# Patient Record
Sex: FEMALE | Race: WHITE | Employment: FULL TIME | URBAN - METROPOLITAN AREA
[De-identification: names, ages, dates, MRNs, and addresses within clinical notes are randomized per-mention and may not be internally consistent; named-entity substitution may affect disease eponyms.]

---

## 2017-02-18 DIAGNOSIS — F43.23 ADJUSTMENT DISORDER WITH MIXED ANXIETY AND DEPRESSED MOOD: ICD-10-CM

## 2017-02-20 RX ORDER — FLUOXETINE HYDROCHLORIDE 20 MG/1
CAPSULE ORAL
Qty: 30 CAP | Refills: 5 | Status: SHIPPED | OUTPATIENT
Start: 2017-02-20 | End: 2017-08-25 | Stop reason: SDUPTHER

## 2017-06-16 ENCOUNTER — OFFICE VISIT (OUTPATIENT)
Dept: FAMILY MEDICINE CLINIC | Age: 52
End: 2017-06-16

## 2017-06-16 VITALS
DIASTOLIC BLOOD PRESSURE: 80 MMHG | SYSTOLIC BLOOD PRESSURE: 118 MMHG | BODY MASS INDEX: 47.09 KG/M2 | TEMPERATURE: 98.6 F | RESPIRATION RATE: 15 BRPM | HEART RATE: 87 BPM | WEIGHT: 293 LBS | OXYGEN SATURATION: 97 % | HEIGHT: 66 IN

## 2017-06-16 DIAGNOSIS — F43.23 ADJUSTMENT DISORDER WITH MIXED ANXIETY AND DEPRESSED MOOD: ICD-10-CM

## 2017-06-16 DIAGNOSIS — Z00.00 ROUTINE GENERAL MEDICAL EXAMINATION AT A HEALTH CARE FACILITY: Primary | ICD-10-CM

## 2017-06-16 DIAGNOSIS — E55.9 VITAMIN D DEFICIENCY: ICD-10-CM

## 2017-06-16 DIAGNOSIS — Z23 ENCOUNTER FOR IMMUNIZATION: ICD-10-CM

## 2017-06-16 DIAGNOSIS — I10 HTN (HYPERTENSION), BENIGN: ICD-10-CM

## 2017-06-16 DIAGNOSIS — C19 COLORECTAL CANCER (HCC): ICD-10-CM

## 2017-06-16 DIAGNOSIS — E66.01 MORBID OBESITY WITH BODY MASS INDEX (BMI) OF 45.0 TO 49.9 IN ADULT (HCC): ICD-10-CM

## 2017-06-16 DIAGNOSIS — M15.9 PRIMARY OSTEOARTHRITIS INVOLVING MULTIPLE JOINTS: ICD-10-CM

## 2017-06-16 RX ORDER — TOPIRAMATE 25 MG/1
25 TABLET ORAL 2 TIMES DAILY WITH MEALS
Qty: 60 TAB | Refills: 1 | Status: SHIPPED | OUTPATIENT
Start: 2017-06-16 | End: 2017-11-03

## 2017-06-16 NOTE — MR AVS SNAPSHOT
Visit Information Date & Time Provider Department Dept. Phone Encounter #  
 6/16/2017  9:00 AM Simmie Spurling, MD [facility] [phone] [id number] Upcoming Health Maintenance Date Due Pneumococcal 19-64 Highest Risk (1 of 3 - PCV13) 12/29/1983 INFLUENZA AGE 9 TO ADULT 8/1/2017 PAP AKA CERVICAL CYTOLOGY 2/10/2018 BREAST CANCER SCRN MAMMOGRAM 3/7/2018 DTaP/Tdap/Td series (2 - Td) 5/9/2021 COLONOSCOPY 3/2/2025 Allergies as of 6/16/2017  Review Complete On: 6/16/2017 By: León Layton LPN Severity Noted Reaction Type Reactions Sulfa (Sulfonamide Antibiotics) High 03/23/2010   Systemic Hives Current Immunizations  Never Reviewed Name Date Influenza Vaccine 11/14/2016 Influenza Vaccine (Quad) PF 10/1/2015 Influenza Vaccine PF 1/22/2014 TDAP Vaccine 5/9/2011 Td, Adsorbed PF 6/16/2017 Not reviewed this visit You Were Diagnosed With   
  
 Codes Comments Routine general medical examination at a health care facility    -  Primary ICD-10-CM: Z00.00 ICD-9-CM: V70.0   
 HTN (hypertension), benign     ICD-10-CM: I10 
ICD-9-CM: 401.1 [facility]     ICD-10-CM: C19 
ICD-9-CM: 154.0 Primary osteoarthritis involving multiple joints     ICD-10-CM: M15.0 ICD-9-CM: 715.09 Morbid obesity with body mass index (BMI) of 45.0 to 49.9 in adult [facility])     ICD-10-CM: E66.01, Z68.42 
ICD-9-CM: 278.01, V85.42 Vitamin D deficiency     ICD-10-CM: E55.9 ICD-9-CM: 268.9 Adjustment disorder with mixed anxiety and depressed mood     ICD-10-CM: F43.23 
ICD-9-CM: 309.28 Encounter for immunization     ICD-10-CM: S76 ICD-9-CM: V03.89 Vitals BP Pulse Temp Resp Height(growth percentile) Weight(growth percentile) 118/80 (BP 1 Location: Left arm, BP Patient Position: Sitting) 87 98.6 °F (37 °C) (Oral) 15 5' 6\" (1.676 m) 307 lb (139.3 kg) SpO2 BMI OB Status Smoking Status 97% 49.55 kg/m2 Postmenopausal Never Smoker Vitals History BMI and BSA Data Body Mass Index Body Surface Area 49.55 kg/m 2 2.55 m 2 Preferred Pharmacy Pharmacy Name Phone [address] [facility], [address] [place] [address] [phone] Your Updated Medication List  
  
   
This list is accurate as of: 6/16/17 10:34 AM.  Always use your most recent med list.  
  
  
  
  
 albuterol 90 mcg/actuation inhaler Commonly known as:  PROVENTIL HFA, VENTOLIN HFA, PROAIR HFA Take 2 Puffs by inhalation every six (6) hours as needed for Wheezing. calcium 500 mg Tab Take  by mouth. cyclobenzaprine 5 mg tablet Commonly known as:  FLEXERIL Take 1 Tab by mouth three (3) times daily as needed for Muscle Spasm(s). FLUoxetine 20 mg capsule Commonly known as:  PROzac TAKE ONE CAPSULE BY MOUTH EVERY DAY  
  
 lisinopril 10 mg tablet Commonly known as:  Tanda Limerick Take 1 Tab by mouth daily. MAGNESIUM CARBONATE  
by Does Not Apply route. meloxicam 15 mg tablet Commonly known as:  MOBIC  
TAKE ONE TABLET BY MOUTH EVERY DAY  
  
 omeprazole 20 mg capsule Commonly known as:  PRILOSEC Take 1 Cap by mouth daily. Indications: PREVENTION OF NSAID-INDUCED GASTRIC ULCER  
  
 potassium 99 mg tablet Take 99 mg by mouth daily. topiramate 25 mg tablet Commonly known as:  TOPAMAX Take 1 Tab by mouth two (2) times daily (with meals). VITAMIN D3 1,000 unit Cap Generic drug:  cholecalciferol Take 2,000 Units by mouth daily (after breakfast). Prescriptions Sent to Pharmacy Refills  
 topiramate (TOPAMAX) 25 mg tablet 1 Sig: Take 1 Tab by mouth two (2) times daily (with meals). Class: Normal  
 Pharmacy: [address] [place] [address] Ph #: [phone] Route: Oral  
  
We Performed the Following AMB POC EKG ROUTINE W/ 12 LEADS, INTER & REP [15698 CPT(R)] CBC W/O DIFF [90895 CPT(R)] LIPID PANEL [12561 CPT(R)] METABOLIC PANEL, COMPREHENSIVE [44808 CPT(R)] T4, FREE [id number] CPT(R)] TETANUS AND DIPHTHERIA TOXOIDS (TD) ADSORBED, PRES. FREE, IN INDIVIDS. >=7, IM [id number] CPT(R)] TSH 3RD GENERATION [52065 CPT(R)] URINALYSIS W/MICROSCOPIC [87741 CPT(R)] VITAMIN D, 25 HYDROXY [id number] CPT(R)] Introducing [facility]! Dear Kenisha Blanco: 
Thank you for requesting a [employer] account. Our records indicate that you already have an active [employer]oakroom account. You can access your account anytime at [email or web address] Did you know that you can access your hospital and ER discharge instructions at any time in [employer]? You can also review all of your test results from your hospital stay or ER visit. Additional Information If you have questions, please visit the Frequently Asked Questions section of the [employer] website at [email or web address] Remember, [employer] is NOT to be used for urgent needs. For medical emergencies, dial 911. Now available from your iPhone and Android! Please provide this summary of care documentation to your next provider. Your primary care clinician is listed as [address] [facility], [facility]/s . If you have any questions after today's visit, please call [phone].

## 2017-06-16 NOTE — PROGRESS NOTES
Chief Complaint   Patient presents with    Complete Physical       1. Have you been to the ER, urgent care clinic since your last visit? Hospitalized since your last visit? No    2. Have you seen or consulted any other health care providers outside of the [address] [place] [address] since your last visit? Include any pap smears or colon screening. No    Body mass index is 49.55 kg/(m^2).

## 2017-06-16 NOTE — PROGRESS NOTES
HISTORY OF PRESENT ILLNESS  HPI  Bhavna Alarcon is a 45 y.o. Female with history of HTN, colorectal cancer, vitamin D deficiency, hyperlipidemia, and adjustment disorder with anxiety and depressed mood who presents to office today for a complete physical. Pt complains of lack of energy and motivation. She also states that she wakes up multiple times during the night and averages 30 minutes to fall back asleep. She denies a family or personal history of sleep apnea. Pt regularly does her mammogram at [facility] and pap smear with Dr. Lesley Selby. Both her mammogram and pap smear have been normal, though she does note that she had colon cancer 10 years ago. Pt denies smoking, lung disease, and asthma. Past Medical History:   Diagnosis Date    Adjustment disorder with mixed anxiety and depressed mood     Colorectal Cancer- radiation& chemo after partial colectomy     2008    DJD (degenerative joint disease) 11/29/2010    Elevated triglycerides with high cholesterol 9/29/2014    Encounter for immunization 10/1/2015    HTN (hypertension), benign 3/14/2016    Primary osteoarthritis involving multiple joints 10/1/2015    Vitamin D deficiency 9/29/2014     Past Surgical History:   Procedure Laterality Date    BIOPSY OF BREAST, INCISIONAL  [id number].  HX COLECTOMY  partial for sigmoid cancer- 9/2008     Current Outpatient Prescriptions on File Prior to Visit   Medication Sig Dispense Refill    FLUoxetine (PROZAC) 20 mg capsule TAKE ONE CAPSULE BY MOUTH EVERY DAY 30 Cap 5    meloxicam (MOBIC) 15 mg tablet TAKE ONE TABLET BY MOUTH EVERY DAY 30 Tab 5    lisinopril (PRINIVIL, ZESTRIL) 10 mg tablet Take 1 Tab by mouth daily. 90 Tab 1    omeprazole (PRILOSEC) 20 mg capsule Take 1 Cap by mouth daily.  Indications: PREVENTION OF NSAID-INDUCED GASTRIC ULCER 90 Cap 1    albuterol (PROVENTIL HFA, VENTOLIN HFA, PROAIR HFA) 90 mcg/actuation inhaler Take 2 Puffs by inhalation every six (6) hours as needed for Wheezing. 1 Inhaler 0    potassium 99 mg tablet Take 99 mg by mouth daily.  MAGNESIUM CARBONATE by Does Not Apply route.  calcium 500 mg tab Take  by mouth.  Cholecalciferol, Vitamin D3, (VITAMIN D3) 1,000 unit cap Take 2,000 Units by mouth daily (after breakfast). No current facility-administered medications on file prior to visit. Allergies   Allergen Reactions    Sulfa (Sulfonamide Antibiotics) Hives     Family History   Problem Relation Age of Onset    Alzheimer Mother    Jair Cypher Cancer Father      colo rectal cancer    Cancer Sister      breast left      Social History     Social History    Marital status:      Spouse name: N/A    Number of children: N/A    Years of education: N/A     Social History Main Topics    Smoking status: Never Smoker    Smokeless tobacco: Never Used    Alcohol use No    Drug use: No    Sexual activity: No     Other Topics Concern    None     Social History Narrative               Review of Systems   Constitutional: Positive for malaise/fatigue. Negative for chills, diaphoresis, fever and weight loss. HENT: Negative for congestion, ear discharge, ear pain, hearing loss, nosebleeds, sore throat and tinnitus. Eyes: Negative for blurred vision, double vision, photophobia, pain, discharge and redness. Respiratory: Negative for cough, hemoptysis, sputum production, shortness of breath, wheezing and stridor. Cardiovascular: Negative for chest pain, palpitations, orthopnea, claudication, leg swelling and PND. Gastrointestinal: Negative for abdominal pain, blood in stool, constipation, diarrhea, heartburn, melena, nausea and vomiting. Genitourinary: Negative for dysuria, flank pain, frequency, hematuria and urgency. Musculoskeletal: Negative for back pain, falls, joint pain, myalgias and neck pain. Skin: Negative for itching and rash.    Neurological: Negative for dizziness, tingling, tremors, sensory change, speech change, focal weakness, seizures, loss of consciousness, weakness and headaches. Endo/Heme/Allergies: Negative for environmental allergies and polydipsia. Does not bruise/bleed easily. Psychiatric/Behavioral: Negative for depression, hallucinations, memory loss, substance abuse and suicidal ideas. The patient has insomnia (staying asleep). The patient is not nervous/anxious. Results for orders placed or performed in visit on 06/16/17   URINALYSIS W/MICROSCOPIC   Result Value Ref Range    Specific Gravity 1.025 1.005 - 1.030    pH (UA) 6.5 5.0 - 7.5    Color Yellow Yellow    Appearance Clear Clear    Leukocyte Esterase Negative Negative    Protein Negative Negative/Trace    Glucose Negative Negative    Ketone Negative Negative    Blood Negative Negative    Bilirubin Negative Negative    Urobilinogen 0.2 0.2 - 1.0 mg/dL    Nitrites Negative Negative    Microscopic Examination Comment     Microscopic exam See additional order    MICROSCOPIC EXAMINATION   Result Value Ref Range    WBC 0-5 0 - 5 /hpf    RBC 0-2 0 - 2 /hpf    Epithelial cells 0-10 0 - 10 /hpf    Casts None seen None seen /lpf    Mucus Present Not Estab. Bacteria None seen None seen/Few               Physical Exam  Visit Vitals    /80 (BP 1 Location: Left arm, BP Patient Position: Sitting)    Pulse 87    Temp 98.6 °F (37 °C) (Oral)    Resp 15    Ht 5' 6\" (1.676 m)    Wt 307 lb (139.3 kg)    SpO2 97%    BMI 49.55 kg/m2     General:  Alert, cooperative, no distress, appears stated age. Head:  Normocephalic, without obvious abnormality, atraumatic. Eyes:  Conjunctivae/corneas clear. PERRL, EOMs intact. Fundi benign. Ears:  Normal TMs and external ear canals both ears. Normal audition to finger rub and fingernail clicking   Nose: Nares normal. Septum midline. Mucosa normal. No drainage or sinus tenderness.    Throat: Lips, mucosa, and tongue normal. Teeth and gums normal.   Neck: Supple, symmetrical, trachea midline, no adenopathy, thyroid: no enlargement/tenderness/nodules, no carotid bruit and no JVD. Back:   Symmetric, no curvature. ROM normal. No CVA tenderness. Lungs:   Clear to auscultation bilaterally. Chest wall:  No tenderness or deformity. Heart:  Regular rate and rhythm, S1, S2 normal, no murmur, click, rub or gallop. Abdomen:   Soft, non-tender. Bowel sounds normal. No masses,  No organomegaly. Morbidly obese, very difficult exam but no abnormalities appreciated   Extremities: Extremities normal, atraumatic, no cyanosis or edema. Pulses: 2+ and symmetric all extremities. Skin: Skin color, texture, turgor normal. No rashes or lesions. Lymph nodes: Cervical, supraclavicular, and axillary nodes normal.   Neurologic: CNII-XII intact. Normal strength, sensation and reflexes throughout. ASSESSMENT and PLAN    ICD-10-CM ICD-9-CM    1. Routine general medical examination at a health care facility Z00.00 V70.0 LIPID PANEL      METABOLIC PANEL, COMPREHENSIVE      CBC W/O DIFF      URINALYSIS W/MICROSCOPIC      AMB POC EKG ROUTINE W/ 12 LEADS, INTER & REP      VITAMIN D, 25 HYDROXY      T4, FREE      TSH 3RD GENERATION   2. HTN (hypertension), benign B59 668.3 METABOLIC PANEL, COMPREHENSIVE      CBC W/O DIFF      AMB POC EKG ROUTINE W/ 12 LEADS, INTER & REP   3. Colorectal Cancer- radiation& chemo after partial colectomy-2008 C19 154.0    4. Primary osteoarthritis involving multiple joints M15.0 715.09    5. Morbid obesity with body mass index (BMI) of 45.0 to 49.9 in adult ([facility]) E66.01 278.01 topiramate (TOPAMAX) 25 mg tablet    Z68.42 V85.42    6. Vitamin D deficiency E55.9 268.9 VITAMIN D, 25 HYDROXY   7. Adjustment disorder with mixed anxiety and depressed mood F43.23 309.28    8. Encounter for immunization Z23 V03.89 TETANUS AND DIPHTHERIA TOXOIDS (TD) ADSORBED, PRES.  FREE, IN INDIVIDS. >=7, IM     Chelsy was seen today for complete physical.    Diagnoses and all orders for this visit:    Routine general medical examination at a health care facility  -     LIPID PANEL  -     METABOLIC PANEL, COMPREHENSIVE  -     CBC W/O DIFF  -     URINALYSIS W/MICROSCOPIC  -     AMB POC EKG ROUTINE W/ 12 LEADS, INTER & REP  -     VITAMIN D, 25 HYDROXY  -     T4, FREE  -     TSH 3RD GENERATION    HTN (hypertension), benign  -     METABOLIC PANEL, COMPREHENSIVE  -     CBC W/O DIFF  -     AMB POC EKG ROUTINE W/ 12 LEADS, INTER & REP    Colorectal Cancer- radiation& chemo after partial colectomy-2008    Primary osteoarthritis involving multiple joints    Morbid obesity with body mass index (BMI) of 45.0 to 49.9 in adult (HCC)  -     topiramate (TOPAMAX) 25 mg tablet; Take 1 Tab by mouth two (2) times daily (with meals). Vitamin D deficiency  -     VITAMIN D, 25 HYDROXY    Adjustment disorder with mixed anxiety and depressed mood    Encounter for immunization  -     Tetanus and diphtheria toxoid (TD) adsorbed, pres. free,, in individuals >=7 years, IM    Other orders  -     MICROSCOPIC EXAMINATION      Follow-up Disposition:  Return in about 1 month (around 7/16/2017), or exertional SOB or CP, for F/U of start of Topamax. lab results and schedule of future lab studies reviewed with patient  reviewed diet, exercise and weight control  cardiovascular risk and specific lipid/LDL goals reviewed  reviewed medications and side effects in detail  Please call my office if there are any questions- [phone]. I will arrange for follow up after the lab tests done from today return  Recommended a weekly \"heart check. \" I went into detail how to do this. Call for refills on medications as needed. Discussed expected course/resolution/complications of diagnosis in detail with patient. Medication risks/benefits/costs/interactions/alternatives discussed with patient. Pt was given an after visit summary which includes diagnoses, current medications & vitals.    Pt expressed understanding with the diagnosis and plan    We updated her tetanus today, as she's prone to getting nicks and cuts related to her activities. I suggested that she do a weekly \"heart check\", and we talked about how to do that. She should continue to follow up with her oncologist as far as colon cancer; she's on the routine of an every three year colonoscopy. We talked about the shingles and pneumococcal vaccines; those are probably not indicated at her age. She should consider the shingles vaccine in her late 46s. She apparently takes meloxicam for joint discomfort, and one of the practitioners here told her to take it every other day. She notes that it doesn't help as much, and I told her that it's okay to return to once a day; reducing the dose was purely to reduce her risk. I reviewed with her signs and symptoms of blood in the GI tract. Discussed weight control in detail, the importance of mainly decreased carbs, and for weight maintenance, exercise; discussed different diets and that it isn't as important to watch the type of foods as it is to decrease calorie intake no matter what type of diet you do, etc.     Discussed benefit /risk of medication to help weight reduction vs continued diet and exercise; discussed briefly surgical options for weight loss; she agreed to try Topamax, which sometimes causes significant weight loss. We started her on that, and I reviewed the side effects of that. Eventually we'd like to have her on 50mg BID; we started her off with 25mg once a day. The benefit/risk, onset of action, method of taking, and side effects of this medication discussed in detail with the patient. This document was written by Jackson Montes, as dictated by Luis Corbett MD.  I have reviewed and agree with the above note and have made corrections where appropriate Adalberto Vargas M.D.

## 2017-06-16 NOTE — LETTER
6/19/2017 9:09 AM 
 
Ms. Deb Pelayo [address] [facility] Dear Deb Pelayo: 
 
Please find your most recent results below. Resulted Orders LIPID PANEL Result Value Ref Range Cholesterol, total 206 (H) 100 - 199 mg/dL Triglyceride 175 (H) 0 - 149 mg/dL HDL Cholesterol 36 (L) >39 mg/dL VLDL, calculated 35 5 - 40 mg/dL LDL, calculated 135 (H) 0 - 99 mg/dL Narrative Performed at:  [address]  [id number] : Mejia Seaman MD, Phone:  [phone] METABOLIC PANEL, COMPREHENSIVE Result Value Ref Range Glucose 102 (H) 65 - 99 mg/dL BUN 17 6 - 24 mg/dL Creatinine 0.70 0.57 - 1.00 mg/dL GFR est non- >59 mL/min/1.73 GFR est  >59 mL/min/1.73  
 BUN/Creatinine ratio 24 (H) 9 - 23 Sodium 141 134 - 144 mmol/L Potassium 4.9 3.5 - 5.2 mmol/L Chloride 99 96 - 106 mmol/L  
 CO2 25 18 - 29 mmol/L Calcium 9.4 8.7 - 10.2 mg/dL Protein, total 6.9 6.0 - 8.5 g/dL Albumin 4.4 3.5 - 5.5 g/dL GLOBULIN, TOTAL 2.5 1.5 - 4.5 g/dL A-G Ratio 1.8 1.2 - 2.2 Bilirubin, total 0.3 0.0 - 1.2 mg/dL Alk. phosphatase 64 39 - 117 IU/L  
 AST (SGOT) 22 0 - 40 IU/L  
 ALT (SGPT) 23 0 - 32 IU/L Narrative Performed at:  [address]  [id number] : Mejia Seaman MD, Phone:  [phone] CBC W/O DIFF Result Value Ref Range WBC 5.7 3.4 - 10.8 x10E3/uL  
 RBC 4.35 3.77 - 5.28 x10E6/uL HGB 12.5 11.1 - 15.9 g/dL HCT 37.4 34.0 - 46.6 % MCV 86 79 - 97 fL  
 MCH 28.7 26.6 - 33.0 pg  
 MCHC 33.4 31.5 - 35.7 g/dL  
 RDW 15.8 (H) 12.3 - 15.4 % PLATELET 337 965 - 973 x10E3/uL Narrative Performed at:  [address]  [id number] : Mejia Seaman MD, Phone:  [phone] URINALYSIS W/MICROSCOPIC Result Value Ref Range Specific Gravity 1.025 1.005 - 1.030  
 pH (UA) 6.5 5.0 - 7.5 Color Yellow Yellow Appearance Clear Clear Leukocyte Esterase Negative Negative Protein Negative Negative/Trace Glucose Negative Negative Ketone Negative Negative Blood Negative Negative Bilirubin Negative Negative Urobilinogen 0.2 0.2 - 1.0 mg/dL Nitrites Negative Negative Microscopic Examination Comment Comment:  
   Microscopic follows if indicated. Microscopic exam See additional order Comment:  
   Microscopic was indicated and was performed. Narrative Performed at:  [address]  [id number] : Vamshi Juan MD, Phone:  [phone] VITAMIN D, 25 HYDROXY Result Value Ref Range VITAMIN D, 25-HYDROXY 27.2 (L) 30.0 - 100.0 ng/mL Comment:  
   Vitamin D deficiency has been defined by the [address] [place] [address] practice guideline as a 
level of serum 25-OH vitamin D less than 20 ng/mL (1,2). The Endocrine Society went on to further define vitamin D 
insufficiency as a level between 21 and 29 ng/mL (2). 1. IOM ([employer]). 2010. Dietary reference 
   intakes for calcium and D. [address] [place] [address]: The 
   Confer. 2. Kody MF, Thang NC, Cristine CHRISTY, et al. 
   Evaluation, treatment, and prevention of vitamin D 
   deficiency: an Endocrine Society clinical practice 
   guideline. JCEM. 2011 Jul; 96(7):1911-30. Narrative Performed at:  [address]  [id number] : Vamshi Juan MD, Phone:  [phone] T4, FREE Result Value Ref Range T4, Free 1.05 0.82 - 1.77 ng/dL Narrative Performed at:  [address]  [id number] : Vamshi Juan MD, Phone:  [phone] TSH 3RD GENERATION Result Value Ref Range TSH 0.476 0.450 - 4.500 uIU/mL Narrative Performed at:  [address]  [id number] : Akhil Emmanuel MD, Phone:  [phone] MICROSCOPIC EXAMINATION Result Value Ref Range WBC 0-5 0 - 5 /hpf  
 RBC 0-2 0 - 2 /hpf Epithelial cells 0-10 0 - 10 /hpf Casts None seen None seen /lpf Mucus Present Not Estab. Bacteria None seen None seen/Few Narrative Performed at:  [address]  [id number] : Akhil Emmanuel MD, Phone:  [phone] CVD REPORT Result Value Ref Range INTERPRETATION Note Comment:  
   Supplement report is available. Narrative Performed at:  [address] [place] [address]  [id number] : Sina Rangel PhD, Phone:  [phone] RECOMMENDATIONS: 
The LDL slightly worse, the Triglycerides a little better. These will improve with weight loss. Hopefully you are tolerating the Topamax and that it will work well for you! Everything else is OK except the Vit D- Increase your Vit D to 3000 or 4000 units daily and stay on this life long. Recheck fasting office visit in 6 months; let's have you follow up in 4-6 weeks the use of topamax. Please call me if you have any questions: [phone] Sincerely, 
 
 
Norman Randall MD

## 2017-06-17 LAB
25(OH)D3+25(OH)D2 SERPL-MCNC: 27.2 NG/ML (ref 30–100)
ALBUMIN SERPL-MCNC: 4.4 G/DL (ref 3.5–5.5)
ALBUMIN/GLOB SERPL: 1.8 {RATIO} (ref 1.2–2.2)
ALP SERPL-CCNC: 64 IU/L (ref 39–117)
ALT SERPL-CCNC: 23 IU/L (ref 0–32)
APPEARANCE UR: CLEAR
AST SERPL-CCNC: 22 IU/L (ref 0–40)
BACTERIA #/AREA URNS HPF: NORMAL /[HPF]
BILIRUB SERPL-MCNC: 0.3 MG/DL (ref 0–1.2)
BILIRUB UR QL STRIP: NEGATIVE
BUN SERPL-MCNC: 17 MG/DL (ref 6–24)
BUN/CREAT SERPL: 24 (ref 9–23)
CALCIUM SERPL-MCNC: 9.4 MG/DL (ref 8.7–10.2)
CASTS URNS QL MICRO: NORMAL /LPF
CHLORIDE SERPL-SCNC: 99 MMOL/L (ref 96–106)
CHOLEST SERPL-MCNC: 206 MG/DL (ref 100–199)
CO2 SERPL-SCNC: 25 MMOL/L (ref 18–29)
COLOR UR: YELLOW
CREAT SERPL-MCNC: 0.7 MG/DL (ref 0.57–1)
EPI CELLS #/AREA URNS HPF: NORMAL /HPF
ERYTHROCYTE [DISTWIDTH] IN BLOOD BY AUTOMATED COUNT: 15.8 % (ref 12.3–15.4)
GLOBULIN SER CALC-MCNC: 2.5 G/DL (ref 1.5–4.5)
GLUCOSE SERPL-MCNC: 102 MG/DL (ref 65–99)
GLUCOSE UR QL: NEGATIVE
HCT VFR BLD AUTO: 37.4 % (ref 34–46.6)
HDLC SERPL-MCNC: 36 MG/DL
HGB BLD-MCNC: 12.5 G/DL (ref 11.1–15.9)
HGB UR QL STRIP: NEGATIVE
INTERPRETATION, 910389: NORMAL
KETONES UR QL STRIP: NEGATIVE
LDLC SERPL CALC-MCNC: 135 MG/DL (ref 0–99)
LEUKOCYTE ESTERASE UR QL STRIP: NEGATIVE
MCH RBC QN AUTO: 28.7 PG (ref 26.6–33)
MCHC RBC AUTO-ENTMCNC: 33.4 G/DL (ref 31.5–35.7)
MCV RBC AUTO: 86 FL (ref 79–97)
MICRO URNS: NORMAL
MICRO URNS: NORMAL
MUCOUS THREADS URNS QL MICRO: PRESENT
NITRITE UR QL STRIP: NEGATIVE
PH UR STRIP: 6.5 [PH] (ref 5–7.5)
PLATELET # BLD AUTO: 336 X10E3/UL (ref 150–379)
POTASSIUM SERPL-SCNC: 4.9 MMOL/L (ref 3.5–5.2)
PROT SERPL-MCNC: 6.9 G/DL (ref 6–8.5)
PROT UR QL STRIP: NEGATIVE
RBC # BLD AUTO: 4.35 X10E6/UL (ref 3.77–5.28)
RBC #/AREA URNS HPF: NORMAL /HPF
SODIUM SERPL-SCNC: 141 MMOL/L (ref 134–144)
SP GR UR: 1.02 (ref 1–1.03)
T4 FREE SERPL-MCNC: 1.05 NG/DL (ref 0.82–1.77)
TRIGL SERPL-MCNC: 175 MG/DL (ref 0–149)
TSH SERPL DL<=0.005 MIU/L-ACNC: 0.48 UIU/ML (ref 0.45–4.5)
UROBILINOGEN UR STRIP-MCNC: 0.2 MG/DL (ref 0.2–1)
VLDLC SERPL CALC-MCNC: 35 MG/DL (ref 5–40)
WBC # BLD AUTO: 5.7 X10E3/UL (ref 3.4–10.8)
WBC #/AREA URNS HPF: NORMAL /HPF

## 2017-06-17 NOTE — PROGRESS NOTES
The LDL slightly worse, the Triglycerides a little better. These will improve with weight loss. Hopefully you are tolerating the Topamax and that it will work well for you! Everything else is OK except the Vit D- Increase your Vit D to 3000 or 4000 units daily and stay on this life long. Recheck fasting office visit in 6 months; let's have you follow up in 4-6 weeks the use of topamax.

## 2017-07-05 DIAGNOSIS — I10 HTN (HYPERTENSION), BENIGN: ICD-10-CM

## 2017-07-06 RX ORDER — LISINOPRIL 10 MG/1
TABLET ORAL
Qty: 30 TAB | Refills: 5 | Status: SHIPPED | OUTPATIENT
Start: 2017-07-06 | End: 2018-01-12 | Stop reason: SDUPTHER

## 2017-07-06 NOTE — TELEPHONE ENCOUNTER
Received faxed refill request for this medication from the pharmacy that is on file.     lisinopril (PRINIVIL, ZESTRIL) 10 mg tablet [Pharmacy Med Name: LISINOPRIL 10 MG TAB>]  TAKE ONE TABLET BY MOUTH DAILY, Normal, Disp-30 Tab, R-0

## 2017-08-25 DIAGNOSIS — F43.23 ADJUSTMENT DISORDER WITH MIXED ANXIETY AND DEPRESSED MOOD: ICD-10-CM

## 2017-08-29 RX ORDER — FLUOXETINE HYDROCHLORIDE 20 MG/1
CAPSULE ORAL
Qty: 30 CAP | Refills: 0 | Status: SHIPPED | OUTPATIENT
Start: 2017-08-29 | End: 2017-09-27 | Stop reason: SDUPTHER

## 2017-09-27 DIAGNOSIS — F43.23 ADJUSTMENT DISORDER WITH MIXED ANXIETY AND DEPRESSED MOOD: ICD-10-CM

## 2017-09-28 RX ORDER — FLUOXETINE HYDROCHLORIDE 20 MG/1
CAPSULE ORAL
Qty: 30 CAP | Refills: 8 | Status: SHIPPED | OUTPATIENT
Start: 2017-09-28 | End: 2018-07-02 | Stop reason: SDUPTHER

## 2017-10-05 DIAGNOSIS — M15.9 PRIMARY OSTEOARTHRITIS INVOLVING MULTIPLE JOINTS: ICD-10-CM

## 2017-10-05 RX ORDER — MELOXICAM 15 MG/1
TABLET ORAL
Qty: 30 TAB | Refills: 0 | Status: SHIPPED | OUTPATIENT
Start: 2017-10-05 | End: 2017-10-30 | Stop reason: SDUPTHER

## 2017-10-06 DIAGNOSIS — Z79.1 LONG TERM (CURRENT) USE OF NON-STEROIDAL ANTI-INFLAMMATORIES (NSAID): ICD-10-CM

## 2017-10-06 RX ORDER — OMEPRAZOLE 20 MG/1
20 CAPSULE, DELAYED RELEASE ORAL DAILY
Qty: 90 CAP | Refills: 1 | Status: SHIPPED | OUTPATIENT
Start: 2017-10-06 | End: 2018-05-02 | Stop reason: SDUPTHER

## 2017-10-30 DIAGNOSIS — M15.9 PRIMARY OSTEOARTHRITIS INVOLVING MULTIPLE JOINTS: ICD-10-CM

## 2017-10-30 RX ORDER — MELOXICAM 15 MG/1
TABLET ORAL
Qty: 30 TAB | Refills: 0 | Status: SHIPPED | OUTPATIENT
Start: 2017-10-30 | End: 2017-12-06 | Stop reason: SDUPTHER

## 2017-10-30 NOTE — TELEPHONE ENCOUNTER
Pharmacy on file verified. Patient states she needs a refill on her medication.    Best call back number for melany [phone] (H)  Requested Prescriptions     Pending Prescriptions Disp Refills    meloxicam (MOBIC) 15 mg tablet 30 Tab 0

## 2017-11-03 ENCOUNTER — HOSPITAL ENCOUNTER (EMERGENCY)
Age: 52
Discharge: HOME | End: 2017-11-03
Attending: FAMILY MEDICINE

## 2017-11-03 ENCOUNTER — HOSPITAL ENCOUNTER (OUTPATIENT)
Dept: LAB | Age: 52
Discharge: HOME | End: 2017-11-03

## 2017-11-03 VITALS
HEIGHT: 66 IN | DIASTOLIC BLOOD PRESSURE: 78 MMHG | TEMPERATURE: 97.5 F | HEART RATE: 80 BPM | WEIGHT: 293 LBS | BODY MASS INDEX: 47.09 KG/M2 | RESPIRATION RATE: 18 BRPM | OXYGEN SATURATION: 97 % | SYSTOLIC BLOOD PRESSURE: 174 MMHG

## 2017-11-03 DIAGNOSIS — B02.9 HERPES ZOSTER WITHOUT COMPLICATION: ICD-10-CM

## 2017-11-03 DIAGNOSIS — L03.019 PARONYCHIA OF FINGER, UNSPECIFIED LATERALITY: Primary | ICD-10-CM

## 2017-11-03 PROCEDURE — 87186 SC STD MICRODIL/AGAR DIL: CPT | Performed by: FAMILY MEDICINE

## 2017-11-03 PROCEDURE — 87077 CULTURE AEROBIC IDENTIFY: CPT | Performed by: FAMILY MEDICINE

## 2017-11-03 PROCEDURE — 87147 CULTURE TYPE IMMUNOLOGIC: CPT | Performed by: FAMILY MEDICINE

## 2017-11-03 PROCEDURE — 87205 SMEAR GRAM STAIN: CPT | Performed by: FAMILY MEDICINE

## 2017-11-03 RX ORDER — VALACYCLOVIR HYDROCHLORIDE 1 G/1
1000 TABLET, FILM COATED ORAL 3 TIMES DAILY
Qty: 21 TAB | Refills: 0 | Status: SHIPPED | OUTPATIENT
Start: 2017-11-03 | End: 2017-11-10

## 2017-11-03 RX ORDER — DOXYCYCLINE 100 MG/1
100 CAPSULE ORAL 2 TIMES DAILY
Qty: 20 CAP | Refills: 0 | Status: SHIPPED | OUTPATIENT
Start: 2017-11-03 | End: 2017-11-13

## 2017-11-03 RX ORDER — CEPHALEXIN 500 MG/1
500 CAPSULE ORAL 3 TIMES DAILY
Qty: 30 CAP | Refills: 0 | Status: SHIPPED | OUTPATIENT
Start: 2017-11-03 | End: 2017-11-13

## 2017-11-03 RX ORDER — PREDNISONE 5 MG/1
TABLET ORAL
Qty: 21 TAB | Refills: 0 | Status: SHIPPED | OUTPATIENT
Start: 2017-11-03 | End: 2017-11-29

## 2017-11-03 NOTE — DISCHARGE INSTRUCTIONS
Paronychia: Care Instructions  Your Care Instructions  Paronychia (say \"rfmy-dl-OG-mike-uh\") is an infection of the skin around a fingernail or toenail. It happens when germs enter through a break in the skin. The doctor may have made a small cut in the infected area to drain the pus. Most cases of paronychia improve in a few days. But watch your symptoms and follow your doctor's advice. Though rare, a mild case can turn into something more serious and infect your entire finger or toe. Also, it is possible for an infection to return. Follow-up care is a key part of your treatment and safety. Be sure to make and go to all appointments, and call your doctor if you are having problems. It's also a good idea to know your test results and keep a list of the medicines you take. How can you care for yourself at home? · If your doctor told you how to care for your infected nail, follow the doctor's instructions. If you did not get instructions, follow this general advice:  ¨ Wash the area with clean water 2 times a day. Don't use hydrogen peroxide or alcohol, which can slow healing. ¨ You may cover the area with a thin layer of petroleum jelly, such as Vaseline, and a nonstick bandage. ¨ Apply more petroleum jelly and replace the bandage as needed. · If your doctor prescribed antibiotics, take them as directed. Do not stop taking them just because you feel better. You need to take the full course of antibiotics. · Take an over-the-counter pain medicine, such as acetaminophen (Tylenol), ibuprofen (Advil, Motrin), or naproxen (Aleve). Read and follow all instructions on the label. · Do not take two or more pain medicines at the same time unless the doctor told you to. Many pain medicines have acetaminophen, which is Tylenol. Too much acetaminophen (Tylenol) can be harmful. · Prop up the toe or finger so that it is higher than the level of your heart. This will help with pain and swelling. · Apply heat.  Put a warm water bottle, heating pad set on low, or warm cloth on your finger or toe. Do not go to sleep with a heating pad on your skin. · Soak the area in warm water twice a day for 15 minutes each time. After soaking, dry the area well and apply a thin layer of petroleum jelly, such as Vaseline. Put on a new bandage. When should you call for help? Call your doctor now or seek immediate medical care if:  ? · You have signs of new or worsening infection, such as:  ¨ Increased pain, swelling, warmth, or redness. ¨ Red streaks leading from the infected skin. ¨ Pus draining from the area. ¨ A fever. ? Watch closely for changes in your health, and be sure to contact your doctor if:  ? · You do not get better as expected. Where can you learn more? Go to http://adali-carly.info/. Enter C435 in the search box to learn more about \"Paronychia: Care Instructions. \"  Current as of: October 13, 2016  Content Version: 11.4  © 7436-2455 [employer]. Care instructions adapted under license by [employer] (which disclaims liability or warranty for this information). If you have questions about a medical condition or this instruction, always ask your healthcare professional. [address] any warranty or liability for your use of this information.

## 2017-11-03 NOTE — UC PROVIDER NOTE
Patient is a 45 y.o. female presenting with nail problem and rash. The history is provided by the patient. Nail Problem    This is a new problem. Episode onset: 3-4 days ago; right 3rd finger and left 4th finger adjacent to nail - redness, swelling, drainage. The problem has been gradually worsening. The problem is associated with an unknown factor. There has been no fever. The pain is moderate. The pain has been constant since onset. Associated symptoms include blisters, itching and pain. Treatments tried: warm soaks. The treatment provided no relief. Rash    This is a new problem. Episode onset: 2 weeks ago, right abdomen. The problem has not changed since onset. The problem is associated with an unknown factor. There has been no fever. The pain is mild. Associated symptoms include blisters, itching and pain. She has tried nothing for the symptoms. Past Medical History:   Diagnosis Date    Adjustment disorder with mixed anxiety and depressed mood     Colorectal Cancer- radiation& chemo after partial colectomy     2008    DJD (degenerative joint disease) 11/29/2010    Elevated triglycerides with high cholesterol 9/29/2014    Encounter for immunization 10/1/2015    HTN (hypertension), benign 3/14/2016    Primary osteoarthritis involving multiple joints 10/1/2015    Vitamin D deficiency 9/29/2014        Past Surgical History:   Procedure Laterality Date    BIOPSY OF BREAST, INCISIONAL  [id number].  HX COLECTOMY  partial for sigmoid cancer- 9/2008         Family History   Problem Relation Age of Onset    Alzheimer Mother     Cancer Father      colo rectal cancer    Cancer Sister      breast left         Social History     Social History    Marital status:      Spouse name: N/A    Number of children: N/A    Years of education: N/A     Occupational History    Not on file.      Social History Main Topics    Smoking status: Never Smoker    Smokeless tobacco: Never Used    Alcohol use No    Drug use: No    Sexual activity: No     Other Topics Concern    Not on file     Social History Narrative                ALLERGIES: Sulfa (sulfonamide antibiotics)    Review of Systems   Constitutional: Negative for chills and fever. Respiratory: Negative for shortness of breath and wheezing. Cardiovascular: Negative for chest pain and palpitations. Gastrointestinal: Negative for nausea and vomiting. Skin: Positive for itching and rash. Vitals:    11/03/17 1801   BP: 174/78   Pulse: 80   Resp: 18   Temp: 97.5 °F (36.4 °C)   SpO2: 97%   Weight: 143.3 kg (316 lb)   Height: 5' 6\" (1.676 m)       Physical Exam   Constitutional: She appears well-developed and well-nourished. No distress. Skin: She is not diaphoretic. Right 3rd finger, distal: erythema, swelling, TTP, mild purulent drainage; no fluctuance    Right abdomen: erythematous, crusting papular lesions in dermatomal pattern   Psychiatric: She has a normal mood and affect. Her behavior is normal. Judgment and thought content normal.   Nursing note and vitals reviewed. MDM     Differential Diagnosis; Clinical Impression; Plan:     CLINICAL IMPRESSION:  Paronychia of finger, unspecified laterality  (primary encounter diagnosis)  Herpes zoster without complication    Plan:  1. Keflex + doxycycline  2. Valtrex, Prednisone  3. Probiotics  4. Wound Cx  Risk of Significant Complications, Morbidity, and/or Mortality:   Presenting problems: Moderate  Management options:   Moderate  Progress:   Patient progress:  Stable      Procedures

## 2017-11-05 LAB
BACTERIA SPEC CULT: ABNORMAL
GRAM STN SPEC: ABNORMAL
GRAM STN SPEC: ABNORMAL
SERVICE CMNT-IMP: ABNORMAL

## 2017-11-05 NOTE — UC NOTE
Pt returned phone call. ID verified, given results of wound culture. Pt instructed to complete all antibiotics and f/u with PCP.

## 2017-11-05 NOTE — UC NOTE
Received results of wound culture. Attempted to notify pt, no answer, left message for her to return call.

## 2017-11-29 ENCOUNTER — HOSPITAL ENCOUNTER (EMERGENCY)
Age: 52
Discharge: HOME | End: 2017-11-29
Attending: FAMILY MEDICINE

## 2017-11-29 VITALS
DIASTOLIC BLOOD PRESSURE: 80 MMHG | RESPIRATION RATE: 18 BRPM | OXYGEN SATURATION: 97 % | WEIGHT: 293 LBS | HEART RATE: 95 BPM | SYSTOLIC BLOOD PRESSURE: 144 MMHG | TEMPERATURE: 98.2 F | HEIGHT: 66 IN | BODY MASS INDEX: 47.09 KG/M2

## 2017-11-29 DIAGNOSIS — J06.9 VIRAL URI: Primary | ICD-10-CM

## 2017-11-29 NOTE — DISCHARGE INSTRUCTIONS
Learning About COPD and Upper Respiratory Infections  What are upper respiratory infections? An upper respiratory infection, also called a URI, is an infection of the nose, sinuses, or throat. Viruses or bacteria can cause URIs. Colds, the flu, and sinusitis are examples of URIs. These infections are spread by coughs, sneezes, and close contact. How do these infections affect COPD? A URI can worsen COPD symptoms, such as having too much mucus in your lungs, coughing, or being short of breath. What can you do to manage most infections at home? · Do not smoke. Avoid secondhand smoke. · Take an over-the-counter pain medicine, such as acetaminophen (Tylenol), ibuprofen (Advil, Motrin), or naproxen (Aleve). Read and follow all instructions on the label. · Be careful when taking over-the-counter cold or flu medicines and Tylenol at the same time. Many of these medicines have acetaminophen, which is Tylenol. Read the labels to make sure that you are not taking more than the recommended dose. Too much acetaminophen (Tylenol) can be harmful. · If your doctor prescribed antibiotics, take them as directed. Do not stop taking them just because you feel better. You need to take the full course of antibiotics. · Ask your doctor about cough medicines and decongestants. Some doctors recommend these medicines, while others feel that they do not help. · Learn breathing techniques for COPD, such as breathing through pursed lips. These techniques can help you breathe easier. What can you do to prevent these infections? Stay healthy  · Do not smoke. This is the most important step you can take to prevent more damage to your lungs. If you need help quitting, talk to your doctor about stop-smoking programs and medicines. These can increase your chances of quitting for good. · Avoid secondhand smoke, air pollution, and high altitudes. Also avoid cold, dry air and hot, humid air.  Stay at home with your windows closed when air pollution is bad. · Get a flu shot every year. · Get a pneumococcal vaccine shot. If you have had one before, ask your doctor whether you need another dose. · If you must be around people with colds or the flu, wash your hands often. Exercise and eat well  · If your doctor recommends it, get more exercise. Walking is a good choice. Bit by bit, increase the amount you walk every day. Try for at least 30 minutes on most days of the week. · Eat regular, well-balanced meals. Eating right keeps your energy levels up and helps your body fight infection. · Get plenty of rest and sleep. Follow-up care is a key part of your treatment and safety. Be sure to make and go to all appointments, and call your doctor if you are having problems. It's also a good idea to know your test results and keep a list of the medicines you take. Where can you learn more? Go to http://adaliFuriouscarly.info/. Enter Z468 in the search box to learn more about \"Learning About COPD and Upper Respiratory Infections. \"  Current as of: May 12, 2017  Content Version: 11.4  © 3781-5151 [employer]. Care instructions adapted under license by [employer] (which disclaims liability or warranty for this information). If you have questions about a medical condition or this instruction, always ask your healthcare professional. [address] any warranty or liability for your use of this information. Upper Respiratory Infection (Cold): Care Instructions  Your Care Instructions    An upper respiratory infection, or URI, is an infection of the nose, sinuses, or throat. URIs are spread by coughs, sneezes, and direct contact. The common cold is the most frequent kind of URI. The flu and sinus infections are other kinds of URIs. Almost all URIs are caused by viruses. Antibiotics won't cure them. But you can treat most infections with home care.  This may include drinking lots of fluids and taking over-the-counter pain medicine. You will probably feel better in 4 to 10 days. The doctor has checked you carefully, but problems can develop later. If you notice any problems or new symptoms, get medical treatment right away. Follow-up care is a key part of your treatment and safety. Be sure to make and go to all appointments, and call your doctor if you are having problems. It's also a good idea to know your test results and keep a list of the medicines you take. How can you care for yourself at home? · To prevent dehydration, drink plenty of fluids, enough so that your urine is light yellow or clear like water. Choose water and other caffeine-free clear liquids until you feel better. If you have kidney, heart, or liver disease and have to limit fluids, talk with your doctor before you increase the amount of fluids you drink. · Take an over-the-counter pain medicine, such as acetaminophen (Tylenol), ibuprofen (Advil, Motrin), or naproxen (Aleve). Read and follow all instructions on the label. · Before you use cough and cold medicines, check the label. These medicines may not be safe for young children or for people with certain health problems. · Be careful when taking over-the-counter cold or flu medicines and Tylenol at the same time. Many of these medicines have acetaminophen, which is Tylenol. Read the labels to make sure that you are not taking more than the recommended dose. Too much acetaminophen (Tylenol) can be harmful. · Get plenty of rest.  · Do not smoke or allow others to smoke around you. If you need help quitting, talk to your doctor about stop-smoking programs and medicines. These can increase your chances of quitting for good. When should you call for help? Call 911 anytime you think you may need emergency care. For example, call if:  ? · You have severe trouble breathing.    ?Call your doctor now or seek immediate medical care if:  ? · You seem to be getting much sicker. ? · You have new or worse trouble breathing. ? · You have a new or higher fever. ? · You have a new rash. ? Watch closely for changes in your health, and be sure to contact your doctor if:  ? · You have a new symptom, such as a sore throat, an earache, or sinus pain. ? · You cough more deeply or more often, especially if you notice more mucus or a change in the color of your mucus. ? · You do not get better as expected. Where can you learn more? Go to http://adali-carly.info/. Enter Y950 in the search box to learn more about \"Upper Respiratory Infection (Cold): Care Instructions. \"  Current as of: May 12, 2017  Content Version: 11.4  © 8286-9964 [facility]. Care instructions adapted under license by [employer] (which disclaims liability or warranty for this information). If you have questions about a medical condition or this instruction, always ask your healthcare professional. [address] any warranty or liability for your use of this information.

## 2017-11-29 NOTE — UC PROVIDER NOTE
Patient is a 45 y.o. female presenting with sore throat. The history is provided by the patient. Sore Throat    This is a new problem. The current episode started 2 days ago. The problem has not changed since onset. There has been no fever. Associated symptoms include congestion, plugged ear sensation and cough. Pertinent negatives include no shortness of breath, no stridor, no swollen glands and no trouble swallowing. She has had no exposure to strep. She has tried nothing for the symptoms. Past Medical History:   Diagnosis Date    Adjustment disorder with mixed anxiety and depressed mood     Colorectal Cancer- radiation& chemo after partial colectomy     2008    DJD (degenerative joint disease) 11/29/2010    Elevated triglycerides with high cholesterol 9/29/2014    Encounter for immunization 10/1/2015    HTN (hypertension), benign 3/14/2016    Primary osteoarthritis involving multiple joints 10/1/2015    Vitamin D deficiency 9/29/2014        Past Surgical History:   Procedure Laterality Date    BIOPSY OF BREAST, INCISIONAL  [id number].  HX COLECTOMY  partial for sigmoid cancer- 9/2008         Family History   Problem Relation Age of Onset    Alzheimer Mother     Cancer Father      colo rectal cancer    Cancer Sister      breast left         Social History     Social History    Marital status:      Spouse name: N/A    Number of children: N/A    Years of education: N/A     Occupational History    Not on file. Social History Main Topics    Smoking status: Never Smoker    Smokeless tobacco: Never Used    Alcohol use No    Drug use: No    Sexual activity: No     Other Topics Concern    Not on file     Social History Narrative                ALLERGIES: Sulfa (sulfonamide antibiotics)    Review of Systems   HENT: Positive for congestion and sore throat. Negative for trouble swallowing. Respiratory: Positive for cough. Negative for shortness of breath and stridor.     All other systems reviewed and are negative. Vitals:    11/29/17 1337   BP: 144/80   Pulse: 95   Resp: 18   Temp: 98.2 °F (36.8 °C)   SpO2: 97%   Weight: 141.1 kg (311 lb)   Height: 5' 6\" (1.676 m)       Physical Exam   Constitutional: No distress. HENT:   Right Ear: Tympanic membrane and ear canal normal.   Left Ear: Tympanic membrane and ear canal normal.   Nose: Nose normal.   Mouth/Throat: No oropharyngeal exudate, posterior oropharyngeal edema or posterior oropharyngeal erythema. Eyes: Conjunctivae are normal. Right eye exhibits no discharge. Left eye exhibits no discharge. Neck: Neck supple. Pulmonary/Chest: Effort normal and breath sounds normal. No respiratory distress. She has no wheezes. She has no rales. Lymphadenopathy:     She has no cervical adenopathy. Skin: No rash noted. Nursing note and vitals reviewed. MDM     Differential Diagnosis; Clinical Impression; Plan:     CLINICAL IMPRESSION:  Viral URI  (primary encounter diagnosis)      DDX    Plan:    Fluids/ gargles  Claritin/ allegra   Tylenol cold-sinus - max strength 1-2 tab 4 times/ day    with Advil as needed    If not better in 4-5 days - may use Use Mucinex DM twice a day  Amount and/or Complexity of Data Reviewed:    Review and summarize past medical records:  Yes  Risk of Significant Complications, Morbidity, and/or Mortality:   Presenting problems: Moderate  Management options:   Moderate  Progress:   Patient progress:  Stable      Procedures

## 2017-12-06 DIAGNOSIS — M15.9 PRIMARY OSTEOARTHRITIS INVOLVING MULTIPLE JOINTS: ICD-10-CM

## 2017-12-06 RX ORDER — MELOXICAM 15 MG/1
TABLET ORAL
Qty: 30 TAB | Refills: 0 | Status: SHIPPED | OUTPATIENT
Start: 2017-12-06 | End: 2017-12-29 | Stop reason: SDUPTHER

## 2017-12-29 DIAGNOSIS — M15.9 PRIMARY OSTEOARTHRITIS INVOLVING MULTIPLE JOINTS: ICD-10-CM

## 2017-12-30 RX ORDER — MELOXICAM 15 MG/1
TABLET ORAL
Qty: 30 TAB | Refills: 0 | Status: SHIPPED | OUTPATIENT
Start: 2017-12-30 | End: 2018-01-17 | Stop reason: SDUPTHER

## 2018-01-12 DIAGNOSIS — I10 HTN (HYPERTENSION), BENIGN: ICD-10-CM

## 2018-01-12 RX ORDER — LISINOPRIL 10 MG/1
TABLET ORAL
Qty: 30 TAB | Refills: 0 | Status: SHIPPED | OUTPATIENT
Start: 2018-01-12 | End: 2018-01-17 | Stop reason: SDUPTHER

## 2018-01-17 ENCOUNTER — OFFICE VISIT (OUTPATIENT)
Dept: FAMILY MEDICINE CLINIC | Age: 53
End: 2018-01-17

## 2018-01-17 VITALS
HEART RATE: 82 BPM | DIASTOLIC BLOOD PRESSURE: 72 MMHG | TEMPERATURE: 98.2 F | RESPIRATION RATE: 18 BRPM | OXYGEN SATURATION: 97 % | SYSTOLIC BLOOD PRESSURE: 140 MMHG | BODY MASS INDEX: 47.09 KG/M2 | WEIGHT: 293 LBS | HEIGHT: 66 IN

## 2018-01-17 DIAGNOSIS — E78.2 ELEVATED TRIGLYCERIDES WITH HIGH CHOLESTEROL: ICD-10-CM

## 2018-01-17 DIAGNOSIS — M15.9 PRIMARY OSTEOARTHRITIS INVOLVING MULTIPLE JOINTS: ICD-10-CM

## 2018-01-17 DIAGNOSIS — C19 COLORECTAL CANCER (HCC): ICD-10-CM

## 2018-01-17 DIAGNOSIS — I10 HTN (HYPERTENSION), BENIGN: Primary | ICD-10-CM

## 2018-01-17 DIAGNOSIS — E66.01 MORBID OBESITY WITH BODY MASS INDEX (BMI) OF 45.0 TO 49.9 IN ADULT (HCC): ICD-10-CM

## 2018-01-17 DIAGNOSIS — E55.9 VITAMIN D DEFICIENCY: ICD-10-CM

## 2018-01-17 RX ORDER — MELOXICAM 15 MG/1
TABLET ORAL
Qty: 30 TAB | Refills: 5 | Status: SHIPPED | OUTPATIENT
Start: 2018-01-17 | End: 2018-08-13 | Stop reason: SDUPTHER

## 2018-01-17 RX ORDER — LISINOPRIL 10 MG/1
TABLET ORAL
Qty: 30 TAB | Refills: 5 | Status: SHIPPED | OUTPATIENT
Start: 2018-01-17 | End: 2018-08-13 | Stop reason: SDUPTHER

## 2018-01-17 NOTE — ASSESSMENT & PLAN NOTE
Well Controlled, based on history, physical exam and review of pertinent labs, studies and medications; meds reconciled; continue current treatment plan., This condition is managed by Specialist.  Key Oncology Meds     Patient is on no Oncologic meds.         Key Pain Meds             meloxicam (MOBIC) 15 mg tablet  (Taking) TAKE ONE TABLET BY MOUTH EVERY DAY        Lab Results   Component Value Date/Time    WBC 5.7 06/16/2017 10:35 AM    HGB 12.5 06/16/2017 10:35 AM    HCT 37.4 06/16/2017 10:35 AM    PLATELET 742 91/54/1243 10:35 AM    Creatinine 0.70 06/16/2017 10:35 AM    BUN 17 06/16/2017 10:35 AM    ALT (SGPT) 23 06/16/2017 10:35 AM    AST (SGOT) 22 06/16/2017 10:35 AM    Albumin 4.4 06/16/2017 10:35 AM

## 2018-01-17 NOTE — LETTER
6/15/2018 9:11 AM 
 
Ms. Christel Pelayo [address] [facility] Dear Christel Pelayo: 
 
Please find your most recent results below. Resulted Orders LIPID PANEL Result Value Ref Range Cholesterol, total 215 (H) 100 - 199 mg/dL Triglyceride 232 (H) 0 - 149 mg/dL HDL Cholesterol 41 >39 mg/dL VLDL, calculated 46 (H) 5 - 40 mg/dL LDL, calculated 128 (H) 0 - 99 mg/dL Narrative Performed at:  [address]  [id number] : Jesus Whipple MD, Phone:  [phone] METABOLIC PANEL, COMPREHENSIVE Result Value Ref Range Glucose 101 (H) 65 - 99 mg/dL BUN 16 6 - 24 mg/dL Creatinine 0.75 0.57 - 1.00 mg/dL GFR est non-AA 91 >59 mL/min/1.73 GFR est  >59 mL/min/1.73  
 BUN/Creatinine ratio 21 9 - 23 Sodium 141 134 - 144 mmol/L Potassium 4.5 3.5 - 5.2 mmol/L Chloride 99 96 - 106 mmol/L  
 CO2 26 18 - 29 mmol/L Calcium 9.6 8.7 - 10.2 mg/dL Protein, total 7.0 6.0 - 8.5 g/dL Albumin 4.4 3.5 - 5.5 g/dL GLOBULIN, TOTAL 2.6 1.5 - 4.5 g/dL A-G Ratio 1.7 1.2 - 2.2 Bilirubin, total 0.3 0.0 - 1.2 mg/dL Alk. phosphatase 68 39 - 117 IU/L  
 AST (SGOT) 20 0 - 40 IU/L  
 ALT (SGPT) 24 0 - 32 IU/L Narrative Performed at:  [address]  [id number] : Jesus Whipple MD, Phone:  [phone] VITAMIN D, 25 HYDROXY Result Value Ref Range VITAMIN D, 25-HYDROXY 25.8 (L) 30.0 - 100.0 ng/mL Comment:  
   Vitamin D deficiency has been defined by the [facility] practice guideline as a 
level of serum 25-OH vitamin D less than 20 ng/mL (1,2). The Endocrine Society went on to further define vitamin D 
insufficiency as a level between 21 and 29 ng/mL (2). 1. IOM ([employer]). 2010. Dietary reference 
   intakes for calcium and D. [facility]: The 
   [employer]. 2. Kody MF, Thang NC, Cristine CHRISTY, et al. 
   Evaluation, treatment, and prevention of vitamin D 
   deficiency: an Endocrine Society clinical practice 
   guideline. JCEM. 2011 Jul; 96(7):1911-30. Narrative Performed at:  [address]  [id number] : Agata Santos MD, Phone:  [phone] CVD REPORT Result Value Ref Range INTERPRETATION Note Comment:  
   Supplemental report is available. Narrative Performed at:  [address] [place] [address] [address] [place] [address]  [id number] : Velna Cooks PhD, Phone:  [phone] RECOMMENDATIONS: 
 
No changes- continue same meds, but take the Vit D daily, not sporadically. Has a follow up appt in July. Please call me if you have any questions: [phone] Sincerely, 
 
 
Tally Dubin, MD

## 2018-01-17 NOTE — ASSESSMENT & PLAN NOTE
Worsening, based on history, physical exam and review of pertinent labs, studies and medications; meds reconciled; continue current treatment plan, lifestyle modifications recommended, cONTINUE TO WORK ON WEIGHT LOSS, DECREASING CARBS ETC.. Key Obesity Meds     Patient is on no anti-obesity meds.         Lab Results   Component Value Date/Time    Hemoglobin A1c 5.6 12/16/2016 08:47 AM    Glucose 102 06/16/2017 10:35 AM    Cholesterol, total 206 06/16/2017 10:35 AM    HDL Cholesterol 36 06/16/2017 10:35 AM    LDL, calculated 135 06/16/2017 10:35 AM    Triglyceride 175 06/16/2017 10:35 AM    TSH 0.476 06/16/2017 10:35 AM    Sodium 141 06/16/2017 10:35 AM    Potassium 4.9 06/16/2017 10:35 AM    ALT (SGPT) 23 06/16/2017 10:35 AM    AST (SGOT) 22 06/16/2017 10:35 AM    VITAMIN D, 25-HYDROXY 27.2 06/16/2017 10:35 AM

## 2018-01-17 NOTE — PROGRESS NOTES
Chief Complaint   Patient presents with    Hypertension     fasting    1. Have you been to the ER, urgent care clinic since your last visit? Hospitalized since your last visit? No    2. Have you seen or consulted any other health care providers outside of the [address] [place] [address] since your last visit? Include any pap smears or colon screening.  No

## 2018-01-17 NOTE — MR AVS SNAPSHOT
[address] [facility] [address] 
 
 
 [address] 
[phone] Patient: Blanco Lisa MRN: [id number] :[id number] Visit Information Date & Time Provider Department Dept. Phone Encounter #  
 2018  8:15 AM Sang Falcon  [facility] [phone] [id number] Your Appointments 7/10/2018  8:00 AM  
ROUTINE CARE with Sang Falcon MD  
[facility]) Appt Note: fasting bp check [address] [address]  
[phone]  
  
   
 [address] [address] Upcoming Health Maintenance Date Due Influenza Age 5 to Adult 2017 PAP AKA CERVICAL CYTOLOGY 2/10/2018 BREAST CANCER SCRN MAMMOGRAM 3/7/2018 Bone Densitometry 3/28/2018 Pneumococcal 19-64 Highest Risk (2 of 3 - PCV13) 2018 COLONOSCOPY 3/2/2025 DTaP/Tdap/Td series (3 - Td) 2027 Allergies as of 2018  Review Complete On: 2018 By: Karel Fam, LPN Severity Noted Reaction Type Reactions Sulfa (Sulfonamide Antibiotics) High 2010   Systemic Hives Current Immunizations  Never Reviewed Name Date Influenza Vaccine 2016 Influenza Vaccine (Quad) PF 10/1/2015 Influenza Vaccine PF 2014 TDAP Vaccine 2011 Td, Adsorbed PF 2017 Not reviewed this visit You Were Diagnosed With   
  
 Codes Comments HTN (hypertension), benign    -  Primary ICD-10-CM: I10 
ICD-9-CM: 401.1 Elevated triglycerides with high cholesterol     ICD-10-CM: E78.2 ICD-9-CM: 272.2 Primary osteoarthritis involving multiple joints     ICD-10-CM: M15.0 ICD-9-CM: 715.09 Vitamin D deficiency     ICD-10-CM: E55.9 ICD-9-CM: 268.9 Vitals BP Pulse Temp Resp Height(growth percentile) Weight(growth percentile)  140/72 (BP 1 Location: Left arm, BP Patient Position: At rest) 82 98.2 °F (36.8 °C) 18 5' 6\" (1.676 m) 313 lb (142 kg) SpO2 BMI OB Status Smoking Status 97% 50.52 kg/m2 Postmenopausal Never Smoker BMI and BSA Data Body Mass Index Body Surface Area 50.52 kg/m 2 2.57 m 2 Preferred Pharmacy Pharmacy Name Phone [facility], [address] [place] [address] [phone] Your Updated Medication List  
  
   
This list is accurate as of: 1/17/18 10:06 AM.  Always use your most recent med list.  
  
  
  
  
 albuterol 90 mcg/actuation inhaler Commonly known as:  PROVENTIL HFA, VENTOLIN HFA, PROAIR HFA Take 2 Puffs by inhalation every six (6) hours as needed for Wheezing. calcium 500 mg Tab Take  by mouth. FLUoxetine 20 mg capsule Commonly known as:  PROzac TAKE ONE CAPSULE BY MOUTH EVERY DAY  
  
 lisinopril 10 mg tablet Commonly known as:  PRINIVIL, ZESTRIL  
TAKE ONE TABLET BY MOUTH EVERY DAY  
  
 MAGNESIUM CARBONATE  
by Does Not Apply route. meloxicam 15 mg tablet Commonly known as:  MOBIC  
TAKE ONE TABLET BY MOUTH EVERY DAY  
  
 omeprazole 20 mg capsule Commonly known as:  PRILOSEC Take 1 Cap by mouth daily. Indications: PREVENTION OF NSAID-INDUCED GASTRIC ULCER  
  
 potassium 99 mg tablet Take 99 mg by mouth daily. topiramate 25 mg tablet Commonly known as:  TOPAMAX TAKE ONE TABLET BY MOUTH TWO TIMES A DAY WITH MEALS  
  
 VITAMIN D3 1,000 unit Cap Generic drug:  cholecalciferol Take 2,000 Units by mouth daily (after breakfast). Prescriptions Sent to Pharmacy Refills  
 lisinopril (PRINIVIL, ZESTRIL) 10 mg tablet 5 Sig: TAKE ONE TABLET BY MOUTH EVERY DAY Class: Normal  
 Pharmacy: [facility] Ph #: [phone]  
 meloxicam (MOBIC) 15 mg tablet 5 Sig: TAKE ONE TABLET BY MOUTH EVERY DAY Class: Normal  
 Pharmacy: [facility], [address] Ph #: [phone] We Performed the Following LIPID PANEL [81435 CPT(R)] METABOLIC PANEL, COMPREHENSIVE [83618 CPT(R)] VITAMIN D, 25 HYDROXY [id number] CPT(R)] Introducing [facility] SERVICES! Dear Brenda Camarena: 
Thank you for requesting a [employer] account. Our records indicate that you already have an active [employer] account. You can access your account anytime at [email or web address] Did you know that you can access your hospital and ER discharge instructions at any time in [employer]? You can also review all of your test results from your hospital stay or ER visit. Additional Information If you have questions, please visit the Frequently Asked Questions section of the [employer] website at [email or web address] Remember, [employer] is NOT to be used for urgent needs. For medical emergencies, dial 911. Now available from your iPhone and Android! Please provide this summary of care documentation to your next provider. Your primary care clinician is listed as [address] [address], [facility]/s . If you have any questions after today's visit, please call [phone].

## 2018-01-17 NOTE — PROGRESS NOTES
HISTORY OF PRESENT ILLNESS  HPI  Galdino Renteria is a 47 y.o. female with history of HTN, vitamin D deficiency, hyperlipidemia, and adjustment disorder with anxiety and depressed mood who presents to office today for fasting follow-up. Pt does not check her BP out of office; she does have a cuff at home. She denies unusual SOB, chest pain, and any recent ER visits or hospitalizations. Pt went to  on 11/3 for a rash on her right rib area, which she states was diagnosed as shingles, and a staph infection under her right 3rd and left 4th fingers. She was given Keflex, doxycycline, and Valtrex. Pt began Topamax on 6/16/17 but states she stopped the medication due to side effects of leg tingling and increased appetite. Past Medical History:   Diagnosis Date    Adjustment disorder with mixed anxiety and depressed mood     Colorectal Cancer- radiation& chemo after partial colectomy     2008    DJD (degenerative joint disease) 11/29/2010    Elevated triglycerides with high cholesterol 9/29/2014    Encounter for immunization 10/1/2015    HTN (hypertension), benign 3/14/2016    Primary osteoarthritis involving multiple joints 10/1/2015    Vitamin D deficiency 9/29/2014     Past Surgical History:   Procedure Laterality Date    BIOPSY OF BREAST, INCISIONAL  [id number].  HX COLECTOMY  partial for sigmoid cancer- 9/2008     Current Outpatient Prescriptions on File Prior to Visit   Medication Sig Dispense Refill    omeprazole (PRILOSEC) 20 mg capsule Take 1 Cap by mouth daily. Indications: PREVENTION OF NSAID-INDUCED GASTRIC ULCER 90 Cap 1    FLUoxetine (PROZAC) 20 mg capsule TAKE ONE CAPSULE BY MOUTH EVERY DAY 30 Cap 8    potassium 99 mg tablet Take 99 mg by mouth daily.  MAGNESIUM CARBONATE by Does Not Apply route.  calcium 500 mg tab Take  by mouth.  Cholecalciferol, Vitamin D3, (VITAMIN D3) 1,000 unit cap Take 2,000 Units by mouth daily (after breakfast).        No current facility-administered medications on file prior to visit. Allergies   Allergen Reactions    Sulfa (Sulfonamide Antibiotics) Hives     Family History   Problem Relation Age of Onset    Alzheimer Mother    [facility] Cancer Father      colo rectal cancer    Cancer Sister      breast left      Social History     Social History    Marital status:      Spouse name: N/A    Number of children: N/A    Years of education: N/A     Social History Main Topics    Smoking status: Never Smoker    Smokeless tobacco: Never Used    Alcohol use No    Drug use: No    Sexual activity: No     Other Topics Concern    None     Social History Narrative             Review of Systems   Constitutional: Negative for chills, diaphoresis, fever, malaise/fatigue and weight loss. Eyes: Negative for blurred vision, double vision, pain and redness. Respiratory: Negative for cough, shortness of breath and wheezing. Cardiovascular: Negative for chest pain, palpitations, orthopnea, claudication, leg swelling and PND. Skin: Negative for itching and rash. Neurological: Negative for dizziness, tingling, tremors, sensory change, speech change, focal weakness, seizures, loss of consciousness, weakness and headaches. Results for orders placed or performed during the hospital encounter of 11/03/17   CULTURE, WOUND W GRAM STAIN   Result Value Ref Range    Special Requests: FINGER      GRAM STAIN OCCASIONAL WBCS SEEN      GRAM STAIN 1+ GRAM POSITIVE COCCI IN PAIRS      Culture result: (A)       LIGHT STREPTOCOCCI, BETA HEMOLYTIC GROUP A Penicillin and ampicillin are drugs of choice for treatment of beta-hemolytic streptococcal infections. Susceptibility testing of penicillins and beta-lactams approved by the FDA for treatment of beta-hemolytic streptococcal infections need not be performed routinely, because nonsusceptible isolates are extremely rare.  CLSI 2012    Culture result:        CALLED TO AND READ BACK BY   BRADEN GREENSTREET, RN ON 11/5/17 AT 1145. 31 [address] [facility]      Culture result: FEW STAPHYLOCOCCUS AUREUS (A)         Susceptibility    Staphylococcus aureus - CONSTANTINE     Ampicillin/sulbactam ($) <=8/4 Susceptible ug/mL     Cefazolin ($) <=4 Susceptible ug/mL     Clindamycin ($) <=0.5 Susceptible ug/mL     Daptomycin ($$$$$) <=0.5 Susceptible ug/mL     Erythromycin ($$$$) <=0.5 Susceptible ug/mL     Gentamicin ($) <=4 Susceptible ug/mL     Linezolid ($$$$$) 4 Susceptible ug/mL     Oxacillin <=0.25 Susceptible ug/mL     Rifampin ($$$$)* <=1 Susceptible ug/mL      * Rifampin is not to be used for mono-therapy. Tetracycline <=4 Susceptible ug/mL     Trimeth-Sulfamethoxa <=0.5/9.5 Susceptible ug/mL     Vancomycin ($) 2 Susceptible ug/mL     Ciprofloxacin ($) <=1 Susceptible ug/mL             Physical Exam  Visit Vitals    /72 (BP 1 Location: Left arm, BP Patient Position: At rest)    Pulse 82    Temp 98.2 °F (36.8 °C)    Resp 18    Ht 5' 6\" (1.676 m)    Wt 313 lb (142 kg)    SpO2 97%    BMI 50.52 kg/m2      No orthostatic changes. Head: Normocephalic, without obvious abnormality, atraumatic  Eyes: conjunctivae/corneas clear. PERRL, EOM's intact. Neck: supple, symmetrical, trachea midline, no adenopathy, thyroid: not enlarged, symmetric, no tenderness/mass/nodules, no carotid bruit and no JVD  Lungs: clear to auscultation bilaterally  Heart: regular rate and rhythm, S1, S2 normal, no murmur, click, rub or gallop  Extremities: extremities normal, atraumatic, no cyanosis or edema  Pulses: 2+ and symmetric  Lymph nodes: Cervical, supraclavicular, and axillary nodes normal.  Neurologic: Grossly normal          ASSESSMENT and PLAN    ICD-10-CM ICD-9-CM    1. HTN (hypertension), benign I10 401.1 lisinopril (PRINIVIL, ZESTRIL) 10 mg tablet   2. Elevated triglycerides with high cholesterol E78.2 272.2 LIPID PANEL      METABOLIC PANEL, COMPREHENSIVE   3.  Primary osteoarthritis involving multiple joints M15.0 715.09 meloxicam (MOBIC) 15 mg tablet   4. Vitamin D deficiency E55.9 268.9 VITAMIN D, 25 HYDROXY   5. Colorectal Cancer- radiation& chemo after partial colectomy-2008 C19 154.0    6. Morbid obesity with body mass index (BMI) of 45.0 to 49.9 in adult ([facility]) E66.01 278.01     Z68.42 V85.42      Diagnoses and all orders for this visit:    1. HTN (hypertension), benign  -     lisinopril (PRINIVIL, ZESTRIL) 10 mg tablet; TAKE ONE TABLET BY MOUTH EVERY DAY    2. Elevated triglycerides with high cholesterol  -     LIPID PANEL  -     METABOLIC PANEL, COMPREHENSIVE    3. Primary osteoarthritis involving multiple joints  -     meloxicam (MOBIC) 15 mg tablet; TAKE ONE TABLET BY MOUTH EVERY DAY    4. Vitamin D deficiency  -     VITAMIN D, 25 HYDROXY    5. Colorectal Cancer- radiation& chemo after partial colectomy-2008  Assessment & Plan:  Well Controlled, based on history, physical exam and review of pertinent labs, studies and medications; meds reconciled; continue current treatment plan., This condition is managed by Specialist.  Key Oncology Meds     Patient is on no Oncologic meds. Key Pain Meds             meloxicam (MOBIC) 15 mg tablet  (Taking) TAKE ONE TABLET BY MOUTH EVERY DAY        Lab Results   Component Value Date/Time    WBC 5.7 06/16/2017 10:35 AM    HGB 12.5 06/16/2017 10:35 AM    HCT 37.4 06/16/2017 10:35 AM    PLATELET 714 20/26/6300 10:35 AM    Creatinine 0.70 06/16/2017 10:35 AM    BUN 17 06/16/2017 10:35 AM    ALT (SGPT) 23 06/16/2017 10:35 AM    AST (SGOT) 22 06/16/2017 10:35 AM    Albumin 4.4 06/16/2017 10:35 AM         6. Morbid obesity with body mass index (BMI) of 45.0 to 49.9 in adult [facility])  Assessment & Plan:  Worsening, based on history, physical exam and review of pertinent labs, studies and medications; meds reconciled; continue current treatment plan, lifestyle modifications recommended, cONTINUE TO WORK ON WEIGHT LOSS, DECREASING CARBS ETC.. Key Obesity Meds     Patient is on no anti-obesity meds. Lab Results   Component Value Date/Time    Hemoglobin A1c 5.6 12/16/2016 08:47 AM    Glucose 102 06/16/2017 10:35 AM    Cholesterol, total 206 06/16/2017 10:35 AM    HDL Cholesterol 36 06/16/2017 10:35 AM    LDL, calculated 135 06/16/2017 10:35 AM    Triglyceride 175 06/16/2017 10:35 AM    TSH 0.476 06/16/2017 10:35 AM    Sodium 141 06/16/2017 10:35 AM    Potassium 4.9 06/16/2017 10:35 AM    ALT (SGPT) 23 06/16/2017 10:35 AM    AST (SGOT) 22 06/16/2017 10:35 AM    VITAMIN D, 25-HYDROXY 27.2 06/16/2017 10:35 AM           Follow-up Disposition:  Return in about 6 months (around 7/17/2018), or BP OOC, for F/U hypertension. lab results and schedule of future lab studies reviewed with patient  reviewed diet, exercise and weight control  cardiovascular risk and specific lipid/LDL goals reviewed  reviewed medications and side effects in detail  Please call my office if there are any questions- [phone]. I will arrange for follow up after the lab tests done from today return  Recommended a weekly \"heart check. \" I went into detail how to do this. Call for refills on medications as needed. Discussed expected course/resolution/complications of diagnosis in detail with patient. Medication risks/benefits/costs/interactions/alternatives discussed with patient. Pt was given an after visit summary which includes diagnoses, current medications & vitals. Pt expressed understanding with the diagnosis and plan. .  Total 25 minutes,60 % counseling re: Reviewed in detail the proper technique of checking the blood pressure- check it on an average day only, not on a stressful day, sitting, no exercise for at least 1 hour and not experiencing any new pain( chronic pain is OK). Patient encouraged to check BP sitting and standing at least once a month and to report these readings to me if > 140/ 90 on average , or if the standing BP is >  15 points lower than the sitting.      Reviewed symptoms, or lack thereof, of hypertension. Sandy Hodgkin Vit D level; recheck yearly and continue same Vit D intake lifelong. Pt asked about her bone density because she keeps getting called about it. She did one in March 2016, so she's not officially due for one until March of this year. She does have vitamin D deficiency but states she is taking her vitamin D regularly, so we'll check that today. Her bone density was entirely normal, and there was no osteopenia. We probably should check one at the two year damien just to make sure that this is accurate and that there's no progression, as she did have a little bit of vitamin D deficiency. I recommended that she do BP checks at home with orthostatic BP checks at least once a month, as well as a \"heart check\" weekly. BMI is significantly elevated- in the obese range. I reviewed diet, exercise and weight control. Discussed weight control in detail, the importance of mainly decreased carbs, and for weight maintenance, exercise; discussed different diets and that it isn't as important to watch the type of foods as it is to decrease calorie intake no matter what type of diet you do, et  Also, discussed symptoms of concern that were noted today in the note above, treatment options( including doing nothing), when to follow up before recommended time frame. Also, answered all questions. This document was written by Loreta Vasquez, as dictated by Monico Alves MD.  I have reviewed and agree with the above note and have made corrections where appropriate Damien Mccarty M.D.

## 2018-01-18 LAB
25(OH)D3+25(OH)D2 SERPL-MCNC: 25.8 NG/ML (ref 30–100)
ALBUMIN SERPL-MCNC: 4.4 G/DL (ref 3.5–5.5)
ALBUMIN/GLOB SERPL: 1.7 {RATIO} (ref 1.2–2.2)
ALP SERPL-CCNC: 68 IU/L (ref 39–117)
ALT SERPL-CCNC: 24 IU/L (ref 0–32)
AST SERPL-CCNC: 20 IU/L (ref 0–40)
BILIRUB SERPL-MCNC: 0.3 MG/DL (ref 0–1.2)
BUN SERPL-MCNC: 16 MG/DL (ref 6–24)
BUN/CREAT SERPL: 21 (ref 9–23)
CALCIUM SERPL-MCNC: 9.6 MG/DL (ref 8.7–10.2)
CHLORIDE SERPL-SCNC: 99 MMOL/L (ref 96–106)
CHOLEST SERPL-MCNC: 215 MG/DL (ref 100–199)
CO2 SERPL-SCNC: 26 MMOL/L (ref 18–29)
CREAT SERPL-MCNC: 0.75 MG/DL (ref 0.57–1)
GLOBULIN SER CALC-MCNC: 2.6 G/DL (ref 1.5–4.5)
GLUCOSE SERPL-MCNC: 101 MG/DL (ref 65–99)
HDLC SERPL-MCNC: 41 MG/DL
INTERPRETATION, 910389: NORMAL
LDLC SERPL CALC-MCNC: 128 MG/DL (ref 0–99)
POTASSIUM SERPL-SCNC: 4.5 MMOL/L (ref 3.5–5.2)
PROT SERPL-MCNC: 7 G/DL (ref 6–8.5)
SODIUM SERPL-SCNC: 141 MMOL/L (ref 134–144)
TRIGL SERPL-MCNC: 232 MG/DL (ref 0–149)
VLDLC SERPL CALC-MCNC: 46 MG/DL (ref 5–40)

## 2018-05-02 DIAGNOSIS — Z79.1 LONG TERM (CURRENT) USE OF NON-STEROIDAL ANTI-INFLAMMATORIES (NSAID): ICD-10-CM

## 2018-05-02 RX ORDER — OMEPRAZOLE 20 MG/1
CAPSULE, DELAYED RELEASE ORAL
Qty: 90 CAP | Refills: 3 | Status: SHIPPED | OUTPATIENT
Start: 2018-05-02 | End: 2020-05-06

## 2018-06-15 NOTE — PROGRESS NOTES
No changes- continue same meds, but take the Vit D daily, not sporadically. Has a follow up appt in July.

## 2018-07-02 DIAGNOSIS — F43.23 ADJUSTMENT DISORDER WITH MIXED ANXIETY AND DEPRESSED MOOD: ICD-10-CM

## 2018-07-03 RX ORDER — FLUOXETINE HYDROCHLORIDE 20 MG/1
CAPSULE ORAL
Qty: 30 CAP | Refills: 5 | Status: SHIPPED | OUTPATIENT
Start: 2018-07-03 | End: 2019-01-04 | Stop reason: SDUPTHER

## 2018-07-30 ENCOUNTER — OFFICE VISIT (OUTPATIENT)
Dept: FAMILY MEDICINE CLINIC | Age: 53
End: 2018-07-30

## 2018-07-30 VITALS
BODY MASS INDEX: 47.09 KG/M2 | DIASTOLIC BLOOD PRESSURE: 80 MMHG | HEIGHT: 66 IN | HEART RATE: 75 BPM | TEMPERATURE: 98.1 F | WEIGHT: 293 LBS | SYSTOLIC BLOOD PRESSURE: 120 MMHG | RESPIRATION RATE: 16 BRPM | OXYGEN SATURATION: 98 %

## 2018-07-30 DIAGNOSIS — E66.01 MORBID OBESITY WITH BODY MASS INDEX (BMI) OF 45.0 TO 49.9 IN ADULT (HCC): ICD-10-CM

## 2018-07-30 DIAGNOSIS — E78.2 ELEVATED TRIGLYCERIDES WITH HIGH CHOLESTEROL: Primary | ICD-10-CM

## 2018-07-30 DIAGNOSIS — E55.9 VITAMIN D DEFICIENCY: ICD-10-CM

## 2018-07-30 DIAGNOSIS — F43.23 ADJUSTMENT DISORDER WITH MIXED ANXIETY AND DEPRESSED MOOD: ICD-10-CM

## 2018-07-30 DIAGNOSIS — C19 COLORECTAL CANCER (HCC): ICD-10-CM

## 2018-07-30 DIAGNOSIS — I10 HTN (HYPERTENSION), BENIGN: ICD-10-CM

## 2018-07-30 DIAGNOSIS — M15.9 PRIMARY OSTEOARTHRITIS INVOLVING MULTIPLE JOINTS: ICD-10-CM

## 2018-07-30 NOTE — PROGRESS NOTES
Chief Complaint Patient presents with  Hypertension 6 month follow up  Cholesterol Problem  
  fasting for labs today \"REVIEWED RECORD IN PREPARATION FOR VISIT AND HAVE OBTAINED THE NECESSARY DOCUMENTATION\" 1. Have you been to the ER, urgent care clinic since your last visit? Hospitalized since your last visit? No 
 
2. Have you seen or consulted any other health care providers outside of the [address] [place] [address] since your last visit? Include any pap smears or colon screening.  No

## 2018-07-30 NOTE — PROGRESS NOTES
HISTORY OF PRESENT ILLNESS 
HPI Gareth Westfall is a 47 y.o. Female with a history of HTN, adjustment disorder with anxiety and depressed mood, colorectal cancer and vitamin D deficiency, who presents at the office today for a fasting follow up. Pt is taking 2000 units of vitamin D daily. Pt denies unusual SOB, chest pain, and any recent ER visits or hospitalizations. Pt has bumps on the inside of her mouth, specifically the inside of her bottom lip. She notes that she noticed them this morning. Past Medical History:  
Diagnosis Date  Adjustment disorder with mixed anxiety and depressed mood  Colorectal Cancer- radiation& chemo after partial colectomy 2008  DJD (degenerative joint disease) 11/29/2010  Elevated triglycerides with high cholesterol 9/29/2014  Encounter for immunization 10/1/2015  
 HTN (hypertension), benign 3/14/2016  Primary osteoarthritis involving multiple joints 10/1/2015  Vitamin D deficiency 9/29/2014 Past Surgical History:  
Procedure Laterality Date  BIOPSY OF BREAST, INCISIONAL  [id number].  HX COLECTOMY  partial for sigmoid cancer- 9/2008 Current Outpatient Prescriptions on File Prior to Visit Medication Sig Dispense Refill  FLUoxetine (PROZAC) 20 mg capsule TAKE ONE CAPSULE BY MOUTH EVERY DAY 30 Cap 5  
 omeprazole (PRILOSEC) 20 mg capsule TAKE ONE CAPSULE BY MOUTH DAILY 90 Cap 3  
 lisinopril (PRINIVIL, ZESTRIL) 10 mg tablet TAKE ONE TABLET BY MOUTH EVERY DAY 30 Tab 5  
 meloxicam (MOBIC) 15 mg tablet TAKE ONE TABLET BY MOUTH EVERY DAY 30 Tab 5  potassium 99 mg tablet Take 99 mg by mouth daily.  MAGNESIUM CARBONATE by Does Not Apply route.  calcium 500 mg tab Take  by mouth.  Cholecalciferol, Vitamin D3, (VITAMIN D3) 1,000 unit cap Take 2,000 Units by mouth daily (after breakfast). No current facility-administered medications on file prior to visit. Allergies Allergen Reactions  Sulfa (Sulfonamide Antibiotics) Hives Family History Problem Relation Age of Onset  Alzheimer Mother  Cancer Father   
  colo rectal cancer  Cancer Sister   
  breast left Social History Social History  Marital status:  Spouse name: N/A  
 Number of children: N/A  
 Years of education: N/A Social History Main Topics  Smoking status: Never Smoker  Smokeless tobacco: Never Used  Alcohol use No  
 Drug use: No  
 Sexual activity: No  
 
Other Topics Concern  None Social History Narrative Review of Systems Constitutional: Negative for chills, diaphoresis, fever, malaise/fatigue and weight loss. Eyes: Negative for blurred vision, double vision, pain and redness. Respiratory: Negative for cough, shortness of breath and wheezing. Cardiovascular: Negative for chest pain, palpitations, orthopnea, claudication, leg swelling and PND. Skin: Negative for itching and rash. Neurological: Negative for dizziness, tingling, tremors, sensory change, speech change, focal weakness, seizures, loss of consciousness, weakness and headaches. Results for orders placed or performed in visit on 01/17/18 LIPID PANEL Result Value Ref Range Cholesterol, total 215 (H) 100 - 199 mg/dL Triglyceride 232 (H) 0 - 149 mg/dL HDL Cholesterol 41 >39 mg/dL VLDL, calculated 46 (H) 5 - 40 mg/dL LDL, calculated 128 (H) 0 - 99 mg/dL METABOLIC PANEL, COMPREHENSIVE Result Value Ref Range Glucose 101 (H) 65 - 99 mg/dL BUN 16 6 - 24 mg/dL Creatinine 0.75 0.57 - 1.00 mg/dL GFR est non-AA 91 >59 mL/min/1.73 GFR est  >59 mL/min/1.73  
 BUN/Creatinine ratio 21 9 - 23 Sodium 141 134 - 144 mmol/L Potassium 4.5 3.5 - 5.2 mmol/L Chloride 99 96 - 106 mmol/L  
 CO2 26 18 - 29 mmol/L Calcium 9.6 8.7 - 10.2 mg/dL Protein, total 7.0 6.0 - 8.5 g/dL Albumin 4.4 3.5 - 5.5 g/dL GLOBULIN, TOTAL 2.6 1.5 - 4.5 g/dL  A-G Ratio 1.7 1.2 - 2.2 Bilirubin, total 0.3 0.0 - 1.2 mg/dL Alk. phosphatase 68 39 - 117 IU/L  
 AST (SGOT) 20 0 - 40 IU/L  
 ALT (SGPT) 24 0 - 32 IU/L  
VITAMIN D, 25 HYDROXY Result Value Ref Range VITAMIN D, 25-HYDROXY 25.8 (L) 30.0 - 100.0 ng/mL CVD REPORT Result Value Ref Range INTERPRETATION Note Physical Exam 
Visit Vitals  /80 (BP 1 Location: Left arm, BP Patient Position: Sitting)  Pulse 75  Temp 98.1 °F (36.7 °C) (Oral)  Resp 16  
 Ht 5' 6\" (1.676 m)  Wt 310 lb (140.6 kg)  SpO2 98%  BMI 50.04 kg/m2 Head: Normocephalic, without obvious abnormality, atraumatic Eyes: conjunctivae/corneas clear. PERRL, EOM's intact. Neck: supple, symmetrical, trachea midline, no adenopathy, thyroid: not enlarged, symmetric, no tenderness/mass/nodules, no carotid bruit and no JVD Lungs: clear to auscultation bilaterally Heart: regular rate and rhythm, S1, S2 normal, no murmur, click, rub or gallop Extremities: extremities normal, atraumatic, no cyanosis or edema Pulses: 2+ and symmetric Lymph nodes: Cervical, supraclavicular, and axillary nodes normal. 
Neurologic: Grossly normal 
 
 
ASSESSMENT and PLAN 
  ICD-10-CM ICD-9-CM 1. Elevated triglycerides with high cholesterol E78.2 272.2 LIPID PANEL  
   METABOLIC PANEL, COMPREHENSIVE 2. Vitamin D deficiency E55.9 268.9 VITAMIN D, 25 HYDROXY 3. HTN (hypertension), benign B39 458.1 METABOLIC PANEL, COMPREHENSIVE 4. Adjustment disorder with mixed anxiety and depressed mood F43.23 309.28   
5. Morbid obesity with body mass index (BMI) of 45.0 to 49.9 in adult (HCC) E66.01 278.01   
 Z68.42 V85.42   
6. Colorectal Cancer- radiation& chemo after partial colectomy-2008 C19 154.0 7. Primary osteoarthritis involving multiple joints M15.0 715.09 Diagnoses and all orders for this visit: 1. Elevated triglycerides with high cholesterol -     LIPID PANEL 
-     METABOLIC PANEL, COMPREHENSIVE 2. Vitamin D deficiency - VITAMIN D, 25 HYDROXY 3. HTN (hypertension), benign -     METABOLIC PANEL, COMPREHENSIVE 4. Adjustment disorder with mixed anxiety and depressed mood 5. Morbid obesity with body mass index (BMI) of 45.0 to 49.9 in adult [facility]) 6. Colorectal Cancer- radiation& chemo after partial colectomy-2008 7. Primary osteoarthritis involving multiple joints Follow-up Disposition: 
Return in about 3 months (around 10/30/2018) for F/U hypertension, f/u Vitamin D deficiency, F/U of obesity. lab results and schedule of future lab studies reviewed with patient 
reviewed diet, exercise and weight control 
cardiovascular risk and specific lipid/LDL goals reviewed 
reviewed medications and side effects in detail Please call my office if there are any questions- [phone]. I will arrange for follow up after the lab tests done from today return Recommended a weekly \"heart check. \" I went into detail how to do this. Call for refills on medications as needed. Discussed expected course/resolution/complications of diagnosis in detail with patient. Medication risks/benefits/costs/interactions/alternatives discussed with patient. Pt was given an after visit summary which includes diagnoses, current medications & vitals. Pt expressed understanding with the diagnosis and plan. Total 25 minutes,60 % counseling re: Reviewed symptoms, or lack thereof, of hypertension and elevated cholesterol. Reviewed in detail the proper technique of checking the blood pressure- check it on an average day only, not on a stressful day, sitting, no exercise for at least 1 hour and not experiencing any new pain( chronic pain is OK). Patient encouraged to check BP sitting and standing at least once a month and to report these readings to me if > 130/ 80 on average , or if the standing BP is >  15 points lower than the sitting. BMI is significantly elevated- in the morbidly obese range.  I reviewed diet, exercise and weight control. Discussed weight control in detail, the importance of mainly decreased carbs, and for weight maintenance, exercise; discussed different diets and that it isn't as important to watch the type of foods as it is to decrease calorie intake no matter what type of diet you do, etc. Patient is aware that there are medications and surgery that are options to assist in weight loss. I also mentioned Melisa/Ambrosio as an effective way to lose weight. Should follow up monthly with an office visit if has interest in working on weight loss. Recommended a weekly \"heart check. \" I went into detail how to do this. Regular exercise is very important to your health; it helps mentally, physically, socially; it prevents injuries if done properly. Exercise, even as simple as walking 20-30 minutes daily has major benefits to your health even though your \"numbers\" are the same in the lab. See if you can add this into your daily regimen and after a few months it will become a regular habit-\"just something you do,\" like brushing your teeth. A combination of aerobic exercise and strengthening and stretching is felt to be the best for you, so this should be your ultimate goal.  
This can be done in the privacy of your home or in a group setting as at the gym  Some prefer having a , others prefer to do exercise in groups or individually. Do what \"works\" for you. You need to make it simple and \"fun,\" or you most likely you will not continue it. Encouraged pt to continue working on her weight and to continue doing a weekly \"heart check\". Informed her that she will need to stay on vitamin D lifelong. Also, discussed symptoms of concern that were noted today in the note above, treatment options( including doing nothing), when to follow up before recommended time frame. Also, answered all questions.  
 
This document was written by Rich Suarez, as dictated by Danial Parr MD. 
I have reviewed and agree with the above note and have made corrections where appropriate Adalberto Connors M.D.

## 2018-07-30 NOTE — MR AVS SNAPSHOT
[address] [place] [address] 
 
 
 [address] 
[phone] Patient: Rosa Valentine MRN: [id number] :1965 Visit Information Date & Time Provider Department Dept. Phone Encounter #  
 2018 12:00 PM Elisha Brown MD [address] [place], [place] [id number] Your Appointments 2019 12:00 PM  
ROUTINE CARE with Elisha Brown MD  
[facility]) Appt Note: HTN/cholesterol follow up  
 [address] [phone]  
[phone]  
  
   
 [address] [address] Upcoming Health Maintenance Date Due  
 PAP AKA CERVICAL CYTOLOGY 2/10/2018 BREAST CANCER SCRN MAMMOGRAM 3/7/2018 Bone Densitometry 3/28/2018 Pneumococcal 19-64 Highest Risk (2 of 3 - PCV13) 2018 Influenza Age 5 to Adult 2018 COLONOSCOPY 3/2/2025 DTaP/Tdap/Td series (3 - Td) 2027 Allergies as of 2018  Review Complete On: 2018 By: Maren Pineda LPN Severity Noted Reaction Type Reactions Sulfa (Sulfonamide Antibiotics) High 2010   Systemic Hives Current Immunizations  Never Reviewed Name Date Influenza Vaccine 2016 Influenza Vaccine (Quad) PF 10/1/2015 Influenza Vaccine PF 2014 TDAP Vaccine 2011 Td, Adsorbed PF 2017 Not reviewed this visit You Were Diagnosed With   
  
 Codes Comments Elevated triglycerides with high cholesterol    -  Primary ICD-10-CM: E78.2 ICD-9-CM: 272.2 Vitamin D deficiency     ICD-10-CM: E55.9 ICD-9-CM: 268.9 HTN (hypertension), benign     ICD-10-CM: I10 
ICD-9-CM: 401.1 Adjustment disorder with mixed anxiety and depressed mood     ICD-10-CM: F43.23 
ICD-9-CM: 309.28  Morbid obesity with body mass index (BMI) of 45.0 to 49.9 in adult [facility])     ICD-10-CM: E66.01, Z68.42 
ICD-9-CM: 278.01, V85.42   
 [facility]     ICD-10-CM: C19 
ICD-9-CM: 154.0 Primary osteoarthritis involving multiple joints     ICD-10-CM: M15.0 ICD-9-CM: 715.09 Vitals BP Pulse Temp Resp Height(growth percentile) Weight(growth percentile) 120/80 (BP 1 Location: Left arm, BP Patient Position: Sitting) 75 98.1 °F (36.7 °C) (Oral) 16 5' 6\" (1.676 m) 310 lb (140.6 kg) SpO2 BMI OB Status Smoking Status 98% 50.04 kg/m2 Postmenopausal Never Smoker BMI and BSA Data Body Mass Index Body Surface Area 50.04 kg/m 2 2.56 m 2 Preferred Pharmacy Pharmacy Name Phone [facility], [address] [place] [address] [phone] Your Updated Medication List  
  
   
This list is accurate as of 7/30/18  2:18 PM.  Always use your most recent med list.  
  
  
  
  
 calcium 500 mg Tab Take  by mouth. FLUoxetine 20 mg capsule Commonly known as:  PROzac TAKE ONE CAPSULE BY MOUTH EVERY DAY  
  
 lisinopril 10 mg tablet Commonly known as:  PRINIVIL, ZESTRIL  
TAKE ONE TABLET BY MOUTH EVERY DAY  
  
 MAGNESIUM CARBONATE  
by Does Not Apply route. meloxicam 15 mg tablet Commonly known as:  MOBIC  
TAKE ONE TABLET BY MOUTH EVERY DAY  
  
 omeprazole 20 mg capsule Commonly known as:  PRILOSEC  
TAKE ONE CAPSULE BY MOUTH DAILY potassium 99 mg tablet Take 99 mg by mouth daily. VITAMIN D3 1,000 unit Cap Generic drug:  cholecalciferol Take 2,000 Units by mouth daily (after breakfast). We Performed the Following LIPID PANEL [65675 CPT(R)] METABOLIC PANEL, COMPREHENSIVE [31907 CPT(R)] VITAMIN D, 25 HYDROXY [id number] CPT(R)] Introducing [facility]! Dear Smiley Fritz: 
Thank you for requesting a [employer] account. Our records indicate that you already have an active [employer] account. You can access your account anytime at [email or web address] Did you know that you can access your hospital and ER discharge instructions at any time in [employer]? You can also review all of your test results from your hospital stay or ER visit. Additional Information If you have questions, please visit the Frequently Asked Questions section of the [employer] website at [email or web address] Remember, [employer] is NOT to be used for urgent needs. For medical emergencies, dial 911. Now available from your iPhone and Android! Please provide this summary of care documentation to your next provider. Your primary care clinician is listed as [address] [id number], [facility]/s . If you have any questions after today's visit, please call [phone].

## 2018-07-31 LAB
25(OH)D3+25(OH)D2 SERPL-MCNC: 22.7 NG/ML (ref 30–100)
ALBUMIN SERPL-MCNC: 4 G/DL (ref 3.5–5.5)
ALBUMIN/GLOB SERPL: 1.5 {RATIO} (ref 1.2–2.2)
ALP SERPL-CCNC: 66 IU/L (ref 39–117)
ALT SERPL-CCNC: 22 IU/L (ref 0–32)
AST SERPL-CCNC: 18 IU/L (ref 0–40)
BILIRUB SERPL-MCNC: 0.3 MG/DL (ref 0–1.2)
BUN SERPL-MCNC: 16 MG/DL (ref 6–24)
BUN/CREAT SERPL: 24 (ref 9–23)
CALCIUM SERPL-MCNC: 9.3 MG/DL (ref 8.7–10.2)
CHLORIDE SERPL-SCNC: 103 MMOL/L (ref 96–106)
CHOLEST SERPL-MCNC: 179 MG/DL (ref 100–199)
CO2 SERPL-SCNC: 25 MMOL/L (ref 20–29)
CREAT SERPL-MCNC: 0.68 MG/DL (ref 0.57–1)
GLOBULIN SER CALC-MCNC: 2.6 G/DL (ref 1.5–4.5)
GLUCOSE SERPL-MCNC: 83 MG/DL (ref 65–99)
HDLC SERPL-MCNC: 42 MG/DL
INTERPRETATION, 910389: NORMAL
LDLC SERPL CALC-MCNC: 100 MG/DL (ref 0–99)
POTASSIUM SERPL-SCNC: 4.6 MMOL/L (ref 3.5–5.2)
PROT SERPL-MCNC: 6.6 G/DL (ref 6–8.5)
SODIUM SERPL-SCNC: 142 MMOL/L (ref 134–144)
TRIGL SERPL-MCNC: 184 MG/DL (ref 0–149)
VLDLC SERPL CALC-MCNC: 37 MG/DL (ref 5–40)

## 2018-09-11 DIAGNOSIS — M15.9 PRIMARY OSTEOARTHRITIS INVOLVING MULTIPLE JOINTS: ICD-10-CM

## 2018-09-11 DIAGNOSIS — I10 HTN (HYPERTENSION), BENIGN: ICD-10-CM

## 2018-09-13 RX ORDER — MELOXICAM 15 MG/1
TABLET ORAL
Qty: 30 TAB | Refills: 0 | Status: SHIPPED | OUTPATIENT
Start: 2018-09-13 | End: 2018-10-15 | Stop reason: SDUPTHER

## 2018-09-13 RX ORDER — LISINOPRIL 10 MG/1
TABLET ORAL
Qty: 30 TAB | Refills: 0 | Status: SHIPPED | OUTPATIENT
Start: 2018-09-13 | End: 2018-10-15 | Stop reason: SDUPTHER

## 2018-10-08 ENCOUNTER — HOSPITAL ENCOUNTER (OUTPATIENT)
Dept: MAMMOGRAPHY | Age: 53
Discharge: HOME | End: 2018-10-08
Attending: OBSTETRICS & GYNECOLOGY
Payer: COMMERCIAL

## 2018-10-08 DIAGNOSIS — Z12.39 SCREENING BREAST EXAMINATION: ICD-10-CM

## 2018-10-08 PROCEDURE — 77067 SCR MAMMO BI INCL CAD: CPT

## 2018-10-15 DIAGNOSIS — M15.9 PRIMARY OSTEOARTHRITIS INVOLVING MULTIPLE JOINTS: ICD-10-CM

## 2018-10-15 DIAGNOSIS — I10 HTN (HYPERTENSION), BENIGN: ICD-10-CM

## 2018-10-18 RX ORDER — LISINOPRIL 10 MG/1
TABLET ORAL
Qty: 90 TAB | Refills: 0 | Status: SHIPPED | OUTPATIENT
Start: 2018-10-18 | End: 2019-01-17 | Stop reason: SDUPTHER

## 2018-10-18 RX ORDER — MELOXICAM 15 MG/1
TABLET ORAL
Qty: 90 TAB | Refills: 0 | Status: SHIPPED | OUTPATIENT
Start: 2018-10-18 | End: 2019-01-17 | Stop reason: SDUPTHER

## 2018-10-30 NOTE — PROGRESS NOTES
I reviewed your labs a few days after they were done and thought I had sent you a letter about them. I was reviewing my outstanding labs today and discovered that you were never notified of those results. I apologize. GREAT NEWS!! All of your recent lab tests are normal or at goal except the Vitamin D- There is no excuse for this- it has been low for years- Start taking 5000 units until I recommend you to stop- that way, we will get you up to therapeutic levels more quickly . No diabetes, normal liver and kidney tests. I would continue everything the same and we will see you at your next fasting office visit in January

## 2019-01-04 DIAGNOSIS — F43.23 ADJUSTMENT DISORDER WITH MIXED ANXIETY AND DEPRESSED MOOD: ICD-10-CM

## 2019-01-04 RX ORDER — FLUOXETINE HYDROCHLORIDE 20 MG/1
CAPSULE ORAL
Qty: 30 CAP | Refills: 0 | Status: SHIPPED | OUTPATIENT
Start: 2019-01-04 | End: 2019-02-06 | Stop reason: SDUPTHER

## 2019-01-17 DIAGNOSIS — I10 HTN (HYPERTENSION), BENIGN: ICD-10-CM

## 2019-01-17 DIAGNOSIS — M15.9 PRIMARY OSTEOARTHRITIS INVOLVING MULTIPLE JOINTS: ICD-10-CM

## 2019-01-18 RX ORDER — LISINOPRIL 10 MG/1
TABLET ORAL
Qty: 30 TAB | Refills: 0 | Status: SHIPPED | OUTPATIENT
Start: 2019-01-18 | End: 2019-02-21 | Stop reason: SDUPTHER

## 2019-01-18 RX ORDER — MELOXICAM 15 MG/1
TABLET ORAL
Qty: 30 TAB | Refills: 0 | Status: SHIPPED | OUTPATIENT
Start: 2019-01-18 | End: 2019-02-21 | Stop reason: SDUPTHER

## 2019-01-30 ENCOUNTER — OFFICE VISIT (OUTPATIENT)
Dept: FAMILY MEDICINE CLINIC | Age: 54
End: 2019-01-30

## 2019-01-30 VITALS
RESPIRATION RATE: 18 BRPM | TEMPERATURE: 98.5 F | DIASTOLIC BLOOD PRESSURE: 90 MMHG | SYSTOLIC BLOOD PRESSURE: 120 MMHG | HEIGHT: 66 IN | WEIGHT: 293 LBS | BODY MASS INDEX: 47.09 KG/M2 | HEART RATE: 81 BPM | OXYGEN SATURATION: 98 %

## 2019-01-30 DIAGNOSIS — E66.01 MORBID OBESITY WITH BODY MASS INDEX (BMI) OF 45.0 TO 49.9 IN ADULT (HCC): ICD-10-CM

## 2019-01-30 DIAGNOSIS — I10 HTN (HYPERTENSION), BENIGN: Primary | ICD-10-CM

## 2019-01-30 DIAGNOSIS — F43.23 ADJUSTMENT DISORDER WITH MIXED ANXIETY AND DEPRESSED MOOD: ICD-10-CM

## 2019-01-30 DIAGNOSIS — Z79.1 LONG TERM (CURRENT) USE OF NON-STEROIDAL ANTI-INFLAMMATORIES (NSAID): ICD-10-CM

## 2019-01-30 DIAGNOSIS — M15.9 PRIMARY OSTEOARTHRITIS INVOLVING MULTIPLE JOINTS: ICD-10-CM

## 2019-01-30 DIAGNOSIS — C19 COLORECTAL CANCER (HCC): ICD-10-CM

## 2019-01-30 DIAGNOSIS — E78.2 ELEVATED TRIGLYCERIDES WITH HIGH CHOLESTEROL: ICD-10-CM

## 2019-01-30 DIAGNOSIS — E55.9 VITAMIN D DEFICIENCY: ICD-10-CM

## 2019-01-30 DIAGNOSIS — S29.012A STRAIN OF MID-BACK, INITIAL ENCOUNTER: ICD-10-CM

## 2019-01-30 NOTE — PROGRESS NOTES
HISTORY OF PRESENT ILLNESS 
HPI More Kelly is a 46 y.o. Female with a history of HTN, colorectal cancer, adjustment disorder with anxiety and depression, osteoarthritis, vitamin D deficiency and arthralgia of left hip, who presents at the office today for a follow up. Pt is fasting today. Pt does not check her BP out of office. Pt is taking 2000 units of vitamin D BID. Pt denies unusual SOB, chest pain, and any recent ER visits or hospitalizations. Pt complains of low back pain since 3 months ago. She denies any radiation into her legs or shoulders. Pt reports uterine bleeding and states that her OB/GYN has already done a biopsy of her uterus. Pt has not heard back from him yet. Past Medical History:  
Diagnosis Date  Adjustment disorder with mixed anxiety and depressed mood  Colorectal Cancer- radiation& chemo after partial colectomy 2008  DJD (degenerative joint disease) 11/29/2010  Elevated triglycerides with high cholesterol 9/29/2014  Encounter for immunization 10/1/2015  
 HTN (hypertension), benign 3/14/2016  Primary osteoarthritis involving multiple joints 10/1/2015  Vitamin D deficiency 9/29/2014 Past Surgical History:  
Procedure Laterality Date  BIOPSY OF BREAST, INCISIONAL  left-2009-neg.  HX BREAST BIOPSY Bilateral   
 benign bx's bilateral  
 HX COLECTOMY  partial for sigmoid cancer- 9/2008 Current Outpatient Medications on File Prior to Visit Medication Sig Dispense Refill  meloxicam (MOBIC) 15 mg tablet TAKE ONE TABLET BY MOUTH EVERY DAY. 30 Tab 0  
 lisinopril (PRINIVIL, ZESTRIL) 10 mg tablet TAKE ONE TABLET BY MOUTH EVERY DAY. 30 Tab 0  
 FLUoxetine (PROZAC) 20 mg capsule TAKE ONE CAPSULE BY MOUTH EVERY DAY 30 Cap 0  
 omeprazole (PRILOSEC) 20 mg capsule TAKE ONE CAPSULE BY MOUTH DAILY 90 Cap 3  potassium 99 mg tablet Take 99 mg by mouth daily.  MAGNESIUM CARBONATE by Does Not Apply route daily.  calcium 500 mg tab Take  by mouth daily.  Cholecalciferol, Vitamin D3, (VITAMIN D3) 1,000 unit cap Take 4,000 Units by mouth daily (after breakfast). No current facility-administered medications on file prior to visit. Allergies Allergen Reactions  Sulfa (Sulfonamide Antibiotics) Hives Family History Problem Relation Age of Onset  Alzheimer Mother  Cancer Father   
     colo rectal cancer  Cancer Sister   
     breast left  Breast Cancer Sister 39 Social History Socioeconomic History  Marital status:  Spouse name: Not on file  Number of children: Not on file  Years of education: Not on file  Highest education level: Not on file Tobacco Use  Smoking status: Never Smoker  Smokeless tobacco: Never Used Substance and Sexual Activity  Alcohol use: No  
 Drug use: No  
 Sexual activity: No  
  Birth control/protection: None Review of Systems Constitutional: Negative for chills, diaphoresis, fever, malaise/fatigue and weight loss. Eyes: Negative for blurred vision, double vision, pain and redness. Respiratory: Negative for cough, shortness of breath and wheezing. Cardiovascular: Negative for chest pain, palpitations, orthopnea, claudication, leg swelling and PND. Skin: Negative for itching and rash. Neurological: Negative for dizziness, tingling, tremors, sensory change, speech change, focal weakness, seizures, loss of consciousness, weakness and headaches. Results for orders placed or performed in visit on 07/30/18 LIPID PANEL Result Value Ref Range Cholesterol, total 179 100 - 199 mg/dL Triglyceride 184 (H) 0 - 149 mg/dL HDL Cholesterol 42 >39 mg/dL VLDL, calculated 37 5 - 40 mg/dL LDL, calculated 100 (H) 0 - 99 mg/dL VITAMIN D, 25 HYDROXY Result Value Ref Range VITAMIN D, 25-HYDROXY 22.7 (L) 30.0 - 100.0 ng/mL METABOLIC PANEL, COMPREHENSIVE Result Value Ref Range Glucose 83 65 - 99 mg/dL BUN 16 6 - 24 mg/dL Creatinine 0.68 0.57 - 1.00 mg/dL GFR est non- >59 mL/min/1.73 GFR est  >59 mL/min/1.73  
 BUN/Creatinine ratio 24 (H) 9 - 23 Sodium 142 134 - 144 mmol/L Potassium 4.6 3.5 - 5.2 mmol/L Chloride 103 96 - 106 mmol/L  
 CO2 25 20 - 29 mmol/L Calcium 9.3 8.7 - 10.2 mg/dL Protein, total 6.6 6.0 - 8.5 g/dL Albumin 4.0 3.5 - 5.5 g/dL GLOBULIN, TOTAL 2.6 1.5 - 4.5 g/dL A-G Ratio 1.5 1.2 - 2.2 Bilirubin, total 0.3 0.0 - 1.2 mg/dL Alk. phosphatase 66 39 - 117 IU/L  
 AST (SGOT) 18 0 - 40 IU/L  
 ALT (SGPT) 22 0 - 32 IU/L  
CVD REPORT Result Value Ref Range INTERPRETATION Note Physical Exam 
Visit Vitals /90 (BP 1 Location: Right arm, BP Patient Position: Sitting) Pulse 81 Temp 98.5 °F (36.9 °C) (Oral) Resp 18 Ht 5' 6\" (1.676 m) Wt 305 lb 3.2 oz (138.4 kg) SpO2 98% BMI 49.26 kg/m² Head: Normocephalic, without obvious abnormality, atraumatic Eyes: conjunctivae/corneas clear. PERRL, EOM's intact. Neck: supple, symmetrical, trachea midline, no adenopathy, thyroid: not enlarged, symmetric, no tenderness/mass/nodules, no carotid bruit and no JVD Lungs: clear to auscultation bilaterally Heart: regular rate and rhythm, S1, S2 normal, no murmur, click, rub or gallop Extremities: extremities normal, atraumatic, no cyanosis or edema Back: symmetric, no curvature. No CVA tenderness. Pain to ROM in general. Negative SLR bilaterally. Skin: Skin color, texture, turgor normal. No rashes or lesions. Pt has large dents in her skin as a result of her bra straps. Pulses: 2+ and symmetric Lymph nodes: Cervical, supraclavicular, and axillary nodes normal. 
Neurologic: Grossly normal 
 
 
ASSESSMENT and PLAN 
  ICD-10-CM ICD-9-CM 1. HTN (hypertension), benign I10 401.1 LIPID PANEL  
   METABOLIC PANEL, COMPREHENSIVE 2. Vitamin D deficiency E55.9 268.9 VITAMIN D, 25 HYDROXY 3. Strain of mid-back, initial encounter S29.012A 847.1 4. Primary osteoarthritis involving multiple joints M15.0 715.09   
5. Adjustment disorder with mixed anxiety and depressed mood F43.23 309.28   
6. Elevated triglycerides with high cholesterol E78.2 272.2 7. Morbid obesity with body mass index (BMI) of 45.0 to 49.9 in adult ([facility]) E66.01 278.01   
 Z68.42 V85.42   
8. Colorectal Cancer- radiation& chemo after partial colectomy-2008 C19 154.0 9. Long term (current) use of non-steroidal anti-inflammatories (nsaid) Z79.1 V58.64 Diagnoses and all orders for this visit: 1. HTN (hypertension), benign -     LIPID PANEL 
-     METABOLIC PANEL, COMPREHENSIVE 2. Vitamin D deficiency 
-     VITAMIN D, 25 HYDROXY 3. Strain of mid-back, initial encounter 4. Primary osteoarthritis involving multiple joints 5. Adjustment disorder with mixed anxiety and depressed mood 6. Elevated triglycerides with high cholesterol 7. Morbid obesity with body mass index (BMI) of 45.0 to 49.9 in adult [facility]) 8. Colorectal Cancer- radiation& chemo after partial colectomy-2008 9. Long term (current) use of non-steroidal anti-inflammatories (nsaid) Follow-up Disposition: 
Return in about 6 months (around 7/30/2019), or if right mid back painsymptoms worsen or fail to improve, for F/U hypertension, f/u Vitamin D deficiency, F/U of obesity. lab results and schedule of future lab studies reviewed with patient 
reviewed diet, exercise and weight control 
cardiovascular risk and specific lipid/LDL goals reviewed 
reviewed medications and side effects in detail Please call my office if there are any questions- [phone]. I will arrange for follow up after the lab tests done from today return Recommended a weekly \"heart check. \" I went into detail how to do this. Call for refills on medications as needed. Discussed expected course/resolution/complications of diagnosis in detail with patient. Medication risks/benefits/costs/interactions/alternatives discussed with patient. Pt was given an after visit summary which includes diagnoses, current medications & vitals. Pt expressed understanding with the diagnosis and plan. Total 25 minutes,60 % counseling re: Recommended a weekly \"heart check. \" I went into detail how to do this. BMI is significantly elevated- in the morbidly obese range. I reviewed diet, exercise and weight control. Discussed weight control in detail, the importance of mainly decreased carbs, and for weight maintenance, exercise; discussed different diets and that it isn't as important to watch the type of foods as it is to decrease calorie intake no matter what type of diet you do, etc. Patient is aware that there are medications and surgery that are options to assist in weight loss. I also mentioned Melisa/Ambrosio as an effective way to lose weight. Should follow up monthly with an office visit if has interest in working on weight loss. Reviewed symptoms, or lack thereof, of hypertension and elevated cholesterol. Reviewed in detail the proper technique of checking the blood pressure- check it on an average day only, not on a stressful day, sitting, no exercise for at least 1 hour and not experiencing any new pain( chronic pain is OK). Patient encouraged to check BP sitting and standing at least once a month and to report these readings to me if > 140/ 90 on average , or if the standing BP is >  15 points lower than the sitting. Regular exercise is very important to your health; it helps mentally, physically, socially; it prevents injuries if done properly. Exercise, even as simple as walking 20-30 minutes daily has major benefits to your health even though your \"numbers\" are the same in the lab. See if you can add this into your daily regimen and after a few months it will become a regular habit-\"just something you do,\" like brushing your teeth. A combination of aerobic exercise and strengthening and stretching is felt to be the best for you, so this should be your ultimate goal.  
This can be done in the privacy of your home or in a group setting as at the gym  Some prefer having a , others prefer to do exercise in groups or individually. Do what \"works\" for you. You need to make it simple and \"fun,\" or you most likely you will not continue it. I informed her that her back discomfort is likely a combination of things, likely musculoskeletal, from her activity moving around and bending over day to day- no recent heavy lifting. Chastity Thompsoner Her heavy breasts are also an aggravating factor. Chastity Mata Her history does not support disc issues or arthritis as the cause. Informed pt that breast reduction surgery would be helpful in reducing the stress on her back, and if she is having regular back discomfort she may wish to consider that. Extra Strength Tylenol 500 mg 1-2 every 6 hours as needed for pain. NSAID OTC of choice- aleve 1-2 every 12 hrs or ibuprofen 200 mg 2, 3, or 4 every 6 hrs ( preferably after food to protect the stomach against ulcers ) as needed for pain. Back exercise sheet given with instructions to do 2 sets of 10 of each twice a day laying on a flat/ firm surface. Congratulated her on her weight loss and encouraged her to continue the same. She is taking 4000 units of vitamin D regularly, and suggestions will be made based on the results of her vitamin D level checked today. Also, discussed symptoms of concern that were noted today in the note above, treatment options( including doing nothing), when to follow up before recommended time frame. Also, answered all questions. This document was written by Magdi Buitrago, as dictated by Kathy Ratliff MD. 
I have reviewed and agree with the above note and have made corrections where appropriate Adalberto Bartholomew M.D.

## 2019-01-30 NOTE — PROGRESS NOTES
Chief Complaint Patient presents with  Hypertension  Vitamin D Deficiency  Cholesterol Problem  Back Pain  
  x 3 months 1. Have you been to the ER, urgent care clinic since your last visit? Hospitalized since your last visit? No 
 
2. Have you seen or consulted any other health care providers outside of the [address] [place] [address] since your last visit? Include any pap smears or colon screening. No 
 
Patient went to gyn last Friday due to vaginal bleeding, a biopsy was performed and results are pending.

## 2019-01-31 LAB
25(OH)D3+25(OH)D2 SERPL-MCNC: 30 NG/ML (ref 30–100)
ALBUMIN SERPL-MCNC: 4.3 G/DL (ref 3.5–5.5)
ALBUMIN/GLOB SERPL: 1.7 {RATIO} (ref 1.2–2.2)
ALP SERPL-CCNC: 61 IU/L (ref 39–117)
ALT SERPL-CCNC: 25 IU/L (ref 0–32)
AST SERPL-CCNC: 26 IU/L (ref 0–40)
BILIRUB SERPL-MCNC: 0.3 MG/DL (ref 0–1.2)
BUN SERPL-MCNC: 13 MG/DL (ref 6–24)
BUN/CREAT SERPL: 19 (ref 9–23)
CALCIUM SERPL-MCNC: 9.7 MG/DL (ref 8.7–10.2)
CHLORIDE SERPL-SCNC: 102 MMOL/L (ref 96–106)
CHOLEST SERPL-MCNC: 204 MG/DL (ref 100–199)
CO2 SERPL-SCNC: 26 MMOL/L (ref 20–29)
CREAT SERPL-MCNC: 0.7 MG/DL (ref 0.57–1)
GLOBULIN SER CALC-MCNC: 2.5 G/DL (ref 1.5–4.5)
GLUCOSE SERPL-MCNC: 87 MG/DL (ref 65–99)
HDLC SERPL-MCNC: 40 MG/DL
INTERPRETATION, 910389: NORMAL
LDLC SERPL CALC-MCNC: 116 MG/DL (ref 0–99)
POTASSIUM SERPL-SCNC: 4.5 MMOL/L (ref 3.5–5.2)
PROT SERPL-MCNC: 6.8 G/DL (ref 6–8.5)
SODIUM SERPL-SCNC: 143 MMOL/L (ref 134–144)
TRIGL SERPL-MCNC: 238 MG/DL (ref 0–149)
VLDLC SERPL CALC-MCNC: 48 MG/DL (ref 5–40)

## 2019-01-31 NOTE — PROGRESS NOTES
Your cholesterol #s are borderline, with elevated triglycerides, but the American Heart Association Heart Risk Calculation( a new way to calculate your risk of a stroke or heart attack in the future) shows your risk to be 3.9 % chance of a heart attack or stroke in the next 10 years; a statin ( a cholesterol lowering medication )  is recommended if that # is > 7.5 %. If nothing changes, you will need to be on a statin at the age of 57 when your risk becomes 7.9%. Continue to work on weight loss to lower the triglycerides and your risk of diabetes. Everything else is OK. No diabetes, normal liver, Vitamin D, and kidney tests. Because of a good Vit D level, recheck yearly and continue same Vit D intake lifelong. Recheck a fasting office visit every 6 months.

## 2019-02-06 DIAGNOSIS — F43.23 ADJUSTMENT DISORDER WITH MIXED ANXIETY AND DEPRESSED MOOD: ICD-10-CM

## 2019-02-07 RX ORDER — FLUOXETINE HYDROCHLORIDE 20 MG/1
CAPSULE ORAL
Qty: 30 CAP | Refills: 11 | Status: SHIPPED | OUTPATIENT
Start: 2019-02-07 | End: 2020-02-20

## 2019-02-21 DIAGNOSIS — I10 HTN (HYPERTENSION), BENIGN: ICD-10-CM

## 2019-02-21 DIAGNOSIS — M15.9 PRIMARY OSTEOARTHRITIS INVOLVING MULTIPLE JOINTS: ICD-10-CM

## 2019-02-21 RX ORDER — LISINOPRIL 10 MG/1
TABLET ORAL
Qty: 30 TAB | Refills: 0 | Status: SHIPPED | OUTPATIENT
Start: 2019-02-21 | End: 2019-03-25 | Stop reason: SDUPTHER

## 2019-02-21 RX ORDER — MELOXICAM 15 MG/1
TABLET ORAL
Qty: 30 TAB | Refills: 0 | Status: SHIPPED | OUTPATIENT
Start: 2019-02-21 | End: 2019-03-25 | Stop reason: SDUPTHER

## 2019-03-26 ENCOUNTER — OFFICE VISIT (OUTPATIENT)
Dept: FAMILY MEDICINE CLINIC | Age: 54
End: 2019-03-26

## 2019-03-26 VITALS
TEMPERATURE: 98.2 F | WEIGHT: 293 LBS | BODY MASS INDEX: 47.09 KG/M2 | DIASTOLIC BLOOD PRESSURE: 85 MMHG | HEIGHT: 66 IN | OXYGEN SATURATION: 97 % | RESPIRATION RATE: 16 BRPM | HEART RATE: 78 BPM | SYSTOLIC BLOOD PRESSURE: 123 MMHG

## 2019-03-26 DIAGNOSIS — I10 HTN (HYPERTENSION), BENIGN: ICD-10-CM

## 2019-03-26 DIAGNOSIS — E55.9 VITAMIN D DEFICIENCY: ICD-10-CM

## 2019-03-26 DIAGNOSIS — J06.9 ACUTE URI: ICD-10-CM

## 2019-03-26 DIAGNOSIS — M15.9 PRIMARY OSTEOARTHRITIS INVOLVING MULTIPLE JOINTS: ICD-10-CM

## 2019-03-26 DIAGNOSIS — Z13.820 OSTEOPOROSIS SCREENING: Primary | ICD-10-CM

## 2019-03-26 DIAGNOSIS — E66.01 MORBID OBESITY WITH BODY MASS INDEX (BMI) OF 45.0 TO 49.9 IN ADULT (HCC): ICD-10-CM

## 2019-03-26 NOTE — PROGRESS NOTES
HISTORY OF PRESENT ILLNESS  HPI  Cheikh Hilton is a 46 y.o. Female with a history of HTN, colorectal cancer, adjustment disorder with anxiety and depression, osteoarthritis, vitamin D deficiency and left hip arthralgia, who presents at the office today for memory issues. Pt states that she has had problems in the past week with remembering her password, or rules/regulations (pt works at the [employer]). Pt reports that she had a sore throat approximately 1 week ago, for which she was taking medication. Pt stopped taking the medication because she believed it was causing memory problems. Pt notes that her sister has had memory problems as well, and her mother was diagnosed with Alzheimer's at 61 y.o. Pt denies unusual SOB, chest pain, and any recent ER visits or hospitalizations. Past Medical History:   Diagnosis Date    Adjustment disorder with mixed anxiety and depressed mood     Colorectal Cancer- radiation& chemo after partial colectomy     2008    DJD (degenerative joint disease) 11/29/2010    Elevated triglycerides with high cholesterol 9/29/2014    Encounter for immunization 10/1/2015    HTN (hypertension), benign 3/14/2016    Primary osteoarthritis involving multiple joints 10/1/2015    Vitamin D deficiency 9/29/2014     Past Surgical History:   Procedure Laterality Date    BIOPSY OF BREAST, INCISIONAL  left-2009-neg.  HX BREAST BIOPSY Bilateral     benign bx's bilateral    HX COLECTOMY  partial for sigmoid cancer- 9/2008     Current Outpatient Medications on File Prior to Visit   Medication Sig Dispense Refill    meloxicam (MOBIC) 15 mg tablet TAKE ONE TABLET BY MOUTH EVERY DAY. 30 Tab 1    lisinopril (PRINIVIL, ZESTRIL) 10 mg tablet TAKE ONE TABLET BY MOUTH EVERY DAY. 30 Tab 1    FLUoxetine (PROZAC) 20 mg capsule TAKE ONE CAPSULE BY MOUTH EVERY DAY.  30 Cap 11    omeprazole (PRILOSEC) 20 mg capsule TAKE ONE CAPSULE BY MOUTH DAILY 90 Cap 3    potassium 99 mg tablet Take 99 mg by mouth daily.      MAGNESIUM CARBONATE 250 Units by Does Not Apply route daily.  calcium 500 mg tab Take  by mouth daily.  Cholecalciferol, Vitamin D3, (VITAMIN D3) 1,000 unit cap Take 4,000 Units by mouth daily (after breakfast). No current facility-administered medications on file prior to visit. Allergies   Allergen Reactions    Sulfa (Sulfonamide Antibiotics) Hives     Family History   Problem Relation Age of Onset    Alzheimer Mother     Cancer Father         colo rectal cancer    Cancer Sister         breast left     Breast Cancer Sister 39     Social History     Socioeconomic History    Marital status:      Spouse name: Not on file    Number of children: Not on file    Years of education: Not on file    Highest education level: Not on file   Tobacco Use    Smoking status: Never Smoker    Smokeless tobacco: Never Used   Substance and Sexual Activity    Alcohol use: No    Drug use: No    Sexual activity: Never     Birth control/protection: None           Review of Systems   Constitutional: Negative for chills, diaphoresis, fever, malaise/fatigue and weight loss. Eyes: Negative for blurred vision, double vision, pain and redness. Respiratory: Negative for cough, shortness of breath and wheezing. Cardiovascular: Negative for chest pain, palpitations, orthopnea, claudication, leg swelling and PND. Skin: Negative for itching and rash. Neurological: Negative for dizziness, tingling, tremors, sensory change, speech change, focal weakness, seizures, loss of consciousness, weakness and headaches.      Results for orders placed or performed in visit on 01/30/19   LIPID PANEL   Result Value Ref Range    Cholesterol, total 204 (H) 100 - 199 mg/dL    Triglyceride 238 (H) 0 - 149 mg/dL    HDL Cholesterol 40 >39 mg/dL    VLDL, calculated 48 (H) 5 - 40 mg/dL    LDL, calculated 116 (H) 0 - 99 mg/dL   METABOLIC PANEL, COMPREHENSIVE   Result Value Ref Range    Glucose 87 65 - 99 mg/dL    BUN 13 6 - 24 mg/dL    Creatinine 0.70 0.57 - 1.00 mg/dL    GFR est non-AA 99 >59 mL/min/1.73    GFR est  >59 mL/min/1.73    BUN/Creatinine ratio 19 9 - 23    Sodium 143 134 - 144 mmol/L    Potassium 4.5 3.5 - 5.2 mmol/L    Chloride 102 96 - 106 mmol/L    CO2 26 20 - 29 mmol/L    Calcium 9.7 8.7 - 10.2 mg/dL    Protein, total 6.8 6.0 - 8.5 g/dL    Albumin 4.3 3.5 - 5.5 g/dL    GLOBULIN, TOTAL 2.5 1.5 - 4.5 g/dL    A-G Ratio 1.7 1.2 - 2.2    Bilirubin, total 0.3 0.0 - 1.2 mg/dL    Alk. phosphatase 61 39 - 117 IU/L    AST (SGOT) 26 0 - 40 IU/L    ALT (SGPT) 25 0 - 32 IU/L   VITAMIN D, 25 HYDROXY   Result Value Ref Range    VITAMIN D, 25-HYDROXY 30.0 30.0 - 100.0 ng/mL   CVD REPORT   Result Value Ref Range    INTERPRETATION Note          Physical Exam  Visit Vitals  /85 (BP 1 Location: Right arm, BP Patient Position: Sitting)   Pulse 78   Temp 98.2 °F (36.8 °C) (Oral)   Resp 16   Ht 5' 6\" (1.676 m)   Wt 303 lb 12.8 oz (137.8 kg)   SpO2 97%   BMI 49.03 kg/m²     Head: Normocephalic, without obvious abnormality, atraumatic  Eyes: conjunctivae/corneas clear. PERRL, EOM's intact. Neck: supple, symmetrical, trachea midline, no adenopathy, thyroid: not enlarged, symmetric, no tenderness/mass/nodules, no carotid bruit and no JVD  Lungs: clear to auscultation bilaterally  Heart: regular rate and rhythm, S1, S2 normal, no murmur, click, rub or gallop  Extremities: extremities normal, atraumatic, no cyanosis or edema  Pulses: 2+ and symmetric  Lymph nodes: Cervical, supraclavicular, and axillary nodes normal.  Neurologic: Non-focal. Short term memory x3 is normal.      ASSESSMENT and PLAN    ICD-10-CM ICD-9-CM    1. Osteoporosis screening Z13.820 V82.81 DEXA BONE DENSITY STUDY AXIAL   2. Acute URI J06.9 465.9      Diagnoses and all orders for this visit:    1. Osteoporosis screening  -     DEXA BONE DENSITY STUDY AXIAL; Future    2.  Acute URI      Follow-up and Dispositions    · Return in about 1 month (around 4/26/2019), or if URI symptoms worsen or fail to improve, for F/U memory issues. reviewed medications and side effects in detail  Please call my office if there are any questions- [phone]. Discussed expected course/resolution/complications of diagnosis in detail with patient. Medication risks/benefits/costs/interactions/alternatives discussed with patient. Pt was given an after visit summary which includes diagnoses, current medications & vitals. Pt expressed understanding with the diagnosis and plan. Total 25 minutes,60 % counseling re:   Patient to call if no better in 3 -4 days and prn new problems. Reviewed her labs that were done on 1/30. Her cholesterol is about the same overall, and she is not at risk enough to start a cholesterol medication at this point. For the URI-Mucinex DM or Robitussin DM for loosening the mucous and slowing down the cough., Extra Strength Tylenol 500 mg 1-2 every 6 hours as needed for pain. Call if signs of active sinus infection. Reviewed in detail the proper technique of checking the blood pressure- check it on an average day only, not on a stressful day, sitting, no exercise for at least 1 hour and not experiencing any new pain( chronic pain is OK). Patient encouraged to check BP sitting and standing at least once a month and to report these readings to me if > 140/ 90 on average , or if the standing BP is >  15 points lower than the sitting. Reviewed symptoms, or lack thereof, of hypertension and elevated cholesterol. If good Vit D level, recheck yearly and continue same Vit D intake lifelong. Long discussion about her memory and how to test it. She will do a short term memory test once a week and get back to me with the results.   Also, discussed symptoms of concern that were noted today in the note above, treatment options( including doing nothing), when to follow up before recommended time frame. Also, answered all questions. This document was written by Deny Daly, as dictated by Josesito Waters MD.  I have reviewed and agree with the above note and have made corrections where appropriate Adalberto Plummer M.D.

## 2019-03-26 NOTE — PROGRESS NOTES
Chief Complaint   Patient presents with    Memory Loss     x 1 week      1. Have you been to the ER, urgent care clinic since your last visit? Hospitalized since your last visit? No    2. Have you seen or consulted any other health care providers outside of the [address] [place] [address] since your last visit? Include any pap smears or colon screening. No      Patient stated that she had a pap done recently, will obtain records.

## 2019-05-23 DIAGNOSIS — I10 HTN (HYPERTENSION), BENIGN: ICD-10-CM

## 2019-05-23 DIAGNOSIS — M15.9 PRIMARY OSTEOARTHRITIS INVOLVING MULTIPLE JOINTS: ICD-10-CM

## 2019-05-24 RX ORDER — LISINOPRIL 10 MG/1
TABLET ORAL
Qty: 30 TAB | Refills: 0 | Status: SHIPPED | OUTPATIENT
Start: 2019-05-24 | End: 2019-06-24 | Stop reason: SDUPTHER

## 2019-05-24 RX ORDER — MELOXICAM 15 MG/1
TABLET ORAL
Qty: 30 TAB | Refills: 0 | Status: SHIPPED | OUTPATIENT
Start: 2019-05-24 | End: 2019-06-24 | Stop reason: SDUPTHER

## 2019-06-24 DIAGNOSIS — I10 HTN (HYPERTENSION), BENIGN: ICD-10-CM

## 2019-06-24 DIAGNOSIS — M15.9 PRIMARY OSTEOARTHRITIS INVOLVING MULTIPLE JOINTS: ICD-10-CM

## 2019-06-24 RX ORDER — LISINOPRIL 10 MG/1
TABLET ORAL
Qty: 30 TAB | Refills: 0 | Status: SHIPPED | OUTPATIENT
Start: 2019-06-24 | End: 2019-07-22 | Stop reason: SDUPTHER

## 2019-06-24 RX ORDER — MELOXICAM 15 MG/1
TABLET ORAL
Qty: 30 TAB | Refills: 0 | Status: SHIPPED | OUTPATIENT
Start: 2019-06-24 | End: 2019-07-22 | Stop reason: SDUPTHER

## 2019-07-22 ENCOUNTER — OFFICE VISIT (OUTPATIENT)
Dept: FAMILY MEDICINE CLINIC | Age: 54
End: 2019-07-22

## 2019-07-22 VITALS
HEART RATE: 73 BPM | BODY MASS INDEX: 47.09 KG/M2 | HEIGHT: 66 IN | RESPIRATION RATE: 18 BRPM | TEMPERATURE: 98.4 F | OXYGEN SATURATION: 96 % | WEIGHT: 293 LBS | DIASTOLIC BLOOD PRESSURE: 70 MMHG | SYSTOLIC BLOOD PRESSURE: 120 MMHG

## 2019-07-22 DIAGNOSIS — E55.9 VITAMIN D DEFICIENCY: ICD-10-CM

## 2019-07-22 DIAGNOSIS — Z79.1 LONG TERM (CURRENT) USE OF NON-STEROIDAL ANTI-INFLAMMATORIES (NSAID): ICD-10-CM

## 2019-07-22 DIAGNOSIS — E78.5 DYSLIPIDEMIA, GOAL LDL BELOW 130: ICD-10-CM

## 2019-07-22 DIAGNOSIS — E78.2 ELEVATED TRIGLYCERIDES WITH HIGH CHOLESTEROL: ICD-10-CM

## 2019-07-22 DIAGNOSIS — C19 COLORECTAL CANCER (HCC): ICD-10-CM

## 2019-07-22 DIAGNOSIS — E66.01 MORBID OBESITY WITH BODY MASS INDEX (BMI) OF 45.0 TO 49.9 IN ADULT (HCC): ICD-10-CM

## 2019-07-22 DIAGNOSIS — M15.9 PRIMARY OSTEOARTHRITIS INVOLVING MULTIPLE JOINTS: ICD-10-CM

## 2019-07-22 DIAGNOSIS — F43.23 ADJUSTMENT DISORDER WITH MIXED ANXIETY AND DEPRESSED MOOD: ICD-10-CM

## 2019-07-22 DIAGNOSIS — I10 HTN (HYPERTENSION), BENIGN: Primary | ICD-10-CM

## 2019-07-22 RX ORDER — LISINOPRIL 10 MG/1
TABLET ORAL
Qty: 30 TAB | Refills: 0 | Status: SHIPPED | OUTPATIENT
Start: 2019-07-22 | End: 2020-02-06

## 2019-07-22 RX ORDER — MELOXICAM 15 MG/1
TABLET ORAL
Qty: 30 TAB | Refills: 0 | Status: SHIPPED | OUTPATIENT
Start: 2019-07-22 | End: 2019-08-23 | Stop reason: SDUPTHER

## 2019-07-22 NOTE — PROGRESS NOTES
HISTORY OF PRESENT ILLNESS  HPI  Juan A Hernandez is a 46 y.o. Female with a history of HTN, colorectal cancer, osteoarthritis, adjustment disorder with anxiety and depression, vitamin D deficiency and arthralgia of left hip, who presents to the office today for a follow up on her cholesterol. Pt complains of back pain that is present especially when laying down or in a pool. Pt says it clinches when she stands up. Pt notes it is bothersome during the night when changing positions or when getting up at night as well. Pt denies family history of DM. Pt notes having a colonoscopy scheduled soon with Dr. Allyson Yang. Pt's last one was over 4 years ago and pt recalls having some polyps. Pt also states her abdominal hernia has returned. Pt reports having colorectal surgery in 2007 as a result of her colorectal cancer. Pt denies unusual SOB, chest pain, and any recent ER visits or hospitalizations. Past Medical History:   Diagnosis Date    Adjustment disorder with mixed anxiety and depressed mood     Colorectal Cancer- radiation& chemo after partial colectomy     2008    DJD (degenerative joint disease) 11/29/2010    Elevated triglycerides with high cholesterol 9/29/2014    Encounter for immunization 10/1/2015    HTN (hypertension), benign 3/14/2016    Primary osteoarthritis involving multiple joints 10/1/2015    Vitamin D deficiency 9/29/2014     Past Surgical History:   Procedure Laterality Date    BIOPSY OF BREAST, INCISIONAL  left-2009-neg.  HX BREAST BIOPSY Bilateral     benign bx's bilateral    HX COLECTOMY  partial for sigmoid cancer- 9/2008     Current Outpatient Medications on File Prior to Visit   Medication Sig Dispense Refill    FLUoxetine (PROZAC) 20 mg capsule TAKE ONE CAPSULE BY MOUTH EVERY DAY. 30 Cap 11    omeprazole (PRILOSEC) 20 mg capsule TAKE ONE CAPSULE BY MOUTH DAILY 90 Cap 3    potassium 99 mg tablet Take 99 mg by mouth daily.       MAGNESIUM CARBONATE 250 Units by Does Not Apply route daily.  calcium 500 mg tab Take  by mouth daily.  Cholecalciferol, Vitamin D3, (VITAMIN D3) 1,000 unit cap Take 4,000 Units by mouth daily (after breakfast). No current facility-administered medications on file prior to visit. Allergies   Allergen Reactions    Sulfa (Sulfonamide Antibiotics) Hives     Family History   Problem Relation Age of Onset    Alzheimer Mother     Cancer Father         colo rectal cancer    Cancer Sister         breast left     Breast Cancer Sister 39     Social History     Socioeconomic History    Marital status:      Spouse name: Not on file    Number of children: Not on file    Years of education: Not on file    Highest education level: Not on file   Tobacco Use    Smoking status: Never Smoker    Smokeless tobacco: Never Used   Substance and Sexual Activity    Alcohol use: No    Drug use: No    Sexual activity: Never     Birth control/protection: None             Review of Systems   Constitutional: Negative for chills, diaphoresis, fever, malaise/fatigue and weight loss. Eyes: Negative for blurred vision, double vision, pain and redness. Respiratory: Negative for cough, shortness of breath and wheezing. Cardiovascular: Negative for chest pain, palpitations, orthopnea, claudication, leg swelling and PND. Musculoskeletal: Positive for back pain. Skin: Negative for itching and rash. Neurological: Negative for dizziness, tingling, tremors, sensory change, speech change, focal weakness, seizures, loss of consciousness, weakness and headaches.      Results for orders placed or performed in visit on 07/22/19   LIPID PANEL   Result Value Ref Range    Cholesterol, total 206 (H) 100 - 199 mg/dL    Triglyceride 202 (H) 0 - 149 mg/dL    HDL Cholesterol 39 (L) >39 mg/dL    VLDL, calculated 40 5 - 40 mg/dL    LDL, calculated 127 (H) 0 - 99 mg/dL   METABOLIC PANEL, COMPREHENSIVE   Result Value Ref Range    Glucose 104 (H) 65 - 99 mg/dL BUN 16 6 - 24 mg/dL    Creatinine 0.85 0.57 - 1.00 mg/dL    GFR est non-AA 78 >59 mL/min/1.73    GFR est AA 90 >59 mL/min/1.73    BUN/Creatinine ratio 19 9 - 23    Sodium 143 134 - 144 mmol/L    Potassium 5.1 3.5 - 5.2 mmol/L    Chloride 104 96 - 106 mmol/L    CO2 26 20 - 29 mmol/L    Calcium 9.5 8.7 - 10.2 mg/dL    Protein, total 6.3 6.0 - 8.5 g/dL    Albumin 4.1 3.5 - 5.5 g/dL    GLOBULIN, TOTAL 2.2 1.5 - 4.5 g/dL    A-G Ratio 1.9 1.2 - 2.2    Bilirubin, total 0.3 0.0 - 1.2 mg/dL    Alk. phosphatase 59 39 - 117 IU/L    AST (SGOT) 19 0 - 40 IU/L    ALT (SGPT) 23 0 - 32 IU/L   CVD REPORT   Result Value Ref Range    INTERPRETATION Note          Physical Exam  Visit Vitals  /70 (BP 1 Location: Left arm, BP Patient Position: Sitting)   Pulse 73   Temp 98.4 °F (36.9 °C) (Oral)   Resp 18   Ht 5' 6\" (1.676 m)   Wt 299 lb 3.2 oz (135.7 kg)   SpO2 96%   BMI 48.29 kg/m²       Head: Normocephalic, without obvious abnormality, atraumatic  Eyes: conjunctivae/corneas clear. PERRL, EOM's intact. Neck: supple, symmetrical, trachea midline, no adenopathy, thyroid: not enlarged, symmetric, no tenderness/mass/nodules, no carotid bruit and no JVD  Lungs: clear to auscultation bilaterally  Breasts: normal appearance, no masses or tenderness. Not examined but very large breasts from external exam.   Heart: regular rate and rhythm, S1, S2 normal, no murmur, click, rub or gallop  Extremities: extremities normal, atraumatic, no cyanosis or edema  Pulses: 2+ and symmetric  Lymph nodes: Cervical, supraclavicular, and axillary nodes normal.  Neurologic: Grossly normal  Back: symmetric, no curvature. ROM normal. No CVA tenderness. Discomfort in mid thoracic area downward, that is aggravated by extreme extensions of the spine. Negative SLR. ASSESSMENT and PLAN    ICD-10-CM ICD-9-CM    1. HTN (hypertension), benign D53 415.5 METABOLIC PANEL, COMPREHENSIVE      lisinopril (PRINIVIL, ZESTRIL) 10 mg tablet   2.  Elevated triglycerides with high cholesterol E78.2 272.2    3. Primary osteoarthritis involving multiple joints M15.0 715.09 DISCONTINUED: meloxicam (MOBIC) 15 mg tablet   4. Dyslipidemia, goal LDL below 130 E78.5 272.4 LIPID PANEL      METABOLIC PANEL, COMPREHENSIVE   5. Vitamin D deficiency E55.9 268.9    6. Adjustment disorder with mixed anxiety and depressed mood F43.23 309.28    7. Colorectal Cancer- radiation& chemo after partial colectomy-2008 C19 154.0    8. Morbid obesity with body mass index (BMI) of 45.0 to 49.9 in adult ([facility]) E66.01 278.01     Z68.42 V85.42    9. Long term (current) use of non-steroidal anti-inflammatories (nsaid) Z79.1 V58.64      Diagnoses and all orders for this visit:    1. HTN (hypertension), benign  -     METABOLIC PANEL, COMPREHENSIVE  -     lisinopril (PRINIVIL, ZESTRIL) 10 mg tablet; TAKE ONE TABLET BY MOUTH EVERY DAY    2. Elevated triglycerides with high cholesterol    3. Primary osteoarthritis involving multiple joints    4. Dyslipidemia, goal LDL below 130  -     LIPID PANEL  -     METABOLIC PANEL, COMPREHENSIVE    5. Vitamin D deficiency    6. Adjustment disorder with mixed anxiety and depressed mood    7. Colorectal Cancer- radiation& chemo after partial colectomy-2008    8. Morbid obesity with body mass index (BMI) of 45.0 to 49.9 in adult ([facility]    9. Long term (current) use of non-steroidal anti-inflammatories (nsaid)    Other orders  -     CVD REPORT      Follow-up and Dispositions    · Return in about 6 months (around 1/22/2020). lab results and schedule of future lab studies reviewed with patient  reviewed diet, exercise and weight control  cardiovascular risk and specific lipid/LDL goals reviewed  reviewed medications and side effects in detail  Please call my office if there are any questions- [phone]. I will arrange for follow up after the lab tests done from today return  Recommended a weekly \"heart check. \" I went into detail how to do this.   Call for refills on medications as needed. Discussed expected course/resolution/complications of diagnosis in detail with patient. Medication risks/benefits/costs/interactions/alternatives discussed with patient. Pt was given an after visit summary which includes diagnoses, current medications & vitals. Pt expressed understanding with the diagnosis and plan. Total 25 minutes,60 % counseling re:   Review of  the proper technique of checking the blood pressure- check it on an average day only, not on a stressful day, sitting, no exercise for at least 1 hour and not experiencing any new pain( chronic pain is OK). Patient encouraged to check BP sitting and standing at least once a month and to report these readings to me if > 140/ 90 on average , or if the standing BP is >  15 points lower than the sitting. Always check it twice and if there is > 5 points decrease from the previous reading( top reading or systolic) keep checking it until it does not drop 5 points. Write only this final reading down, not the preceding readings. If out of these readings there is only 1 out of 4 or less > 517, or > 90 diastolic then your blood pressure is OK; it needs further treatment if it is above this. Also, don't forget,  as noted above, to check your blood pressure standing once a month; this is to detect a drop in your BP that might lead to fainting and serious injury; you check it standing with your arm hanging straight down and relaxed. Check it twice waiting 1 minute between the two readings. If, with either one of these 2 readings there is a > 15 point drop of the systolic compared to your sitting pressure( done before the standing BP), then let me know. Following these guidelines, continue to check your BP and write down only the ones described above and it will help me to effectively treat your blood pressure. Reviewed symptoms, or lack thereof, of hypertension and elevated cholesterol. Recommended a weekly \"heart check. \" I went into detail how to do this. Regular exercise is very important to your health; it helps mentally, physically, socially; it prevents injuries if done properly. Exercise, even as simple as walking 20-30 minutes daily has major benefits to your health even though your \"numbers\" are the same in the lab. See if you can add this into your daily regimen and after a few months it will become a regular habit-\"just something you do,\" like brushing your teeth. A combination of aerobic exercise and strengthening and stretching is felt to be the best for you, so this should be your ultimate goal.   This can be done in the privacy of your home or in a group setting as at the gym  Some prefer having a , others prefer to do exercise in groups or individually. Do what \"works\" for you. You need to make it simple and \"fun,\" or you most likely you will not continue it. If good Vit D level, recheck yearly and continue same Vit D intake lifelong. BMI is significantly elevated- in the morbidly obese range. I reviewed diet, exercise and weight control. Discussed weight control in detail, the importance of mainly decreased carbs, and for weight maintenance, exercise; discussed different diets and that it isn't as important to watch the type of foods as it is to decrease calorie intake no matter what type of diet you do, etc. Patient is aware that there are medications and surgery that are options to assist in weight loss. I also mentioned Melisa/Ambrosio as an effective way to lose weight. Should follow up monthly with an office visit if has interest in working on weight loss. Pt has had long history of back pain from her large breasts which has caused stress on her back. She is interested in looking into relieving her back pain through surgical reduction. Medication and exercise only gives temporary relief. Informed pt that we would call back with a referral for this kind of surgery.    Also, discussed symptoms of concern that were noted today in the note above, treatment options( including doing nothing), when to follow up before recommended time frame. Also, answered all questions. This document was written by Shelton Munoz, as dictated by Joseph Hurtado MD.  I have reviewed and agree with the above note and have made corrections where appropriate Adalberto Bedolla M.D.

## 2019-07-22 NOTE — PROGRESS NOTES
All health maintenance and other pertinent information has been reviewed in preparation for today's office visit. Patient presents in the office today for:    Chief Complaint   Patient presents with    Cholesterol Problem     Fasting follow up     1. Have you been to the ER, urgent care clinic since your last visit? Hospitalized since your last visit? No    2. Have you seen or consulted any other health care providers outside of the [address] [place] [address] since your last visit? Include any pap smears or colon screening. Yes. Follows up with GI approx one month ago.

## 2019-07-23 LAB
ALBUMIN SERPL-MCNC: 4.1 G/DL (ref 3.5–5.5)
ALBUMIN/GLOB SERPL: 1.9 {RATIO} (ref 1.2–2.2)
ALP SERPL-CCNC: 59 IU/L (ref 39–117)
ALT SERPL-CCNC: 23 IU/L (ref 0–32)
AST SERPL-CCNC: 19 IU/L (ref 0–40)
BILIRUB SERPL-MCNC: 0.3 MG/DL (ref 0–1.2)
BUN SERPL-MCNC: 16 MG/DL (ref 6–24)
BUN/CREAT SERPL: 19 (ref 9–23)
CALCIUM SERPL-MCNC: 9.5 MG/DL (ref 8.7–10.2)
CHLORIDE SERPL-SCNC: 104 MMOL/L (ref 96–106)
CHOLEST SERPL-MCNC: 206 MG/DL (ref 100–199)
CO2 SERPL-SCNC: 26 MMOL/L (ref 20–29)
CREAT SERPL-MCNC: 0.85 MG/DL (ref 0.57–1)
GLOBULIN SER CALC-MCNC: 2.2 G/DL (ref 1.5–4.5)
GLUCOSE SERPL-MCNC: 104 MG/DL (ref 65–99)
HDLC SERPL-MCNC: 39 MG/DL
INTERPRETATION, 910389: NORMAL
LDLC SERPL CALC-MCNC: 127 MG/DL (ref 0–99)
POTASSIUM SERPL-SCNC: 5.1 MMOL/L (ref 3.5–5.2)
PROT SERPL-MCNC: 6.3 G/DL (ref 6–8.5)
SODIUM SERPL-SCNC: 143 MMOL/L (ref 134–144)
TRIGL SERPL-MCNC: 202 MG/DL (ref 0–149)
VLDLC SERPL CALC-MCNC: 40 MG/DL (ref 5–40)

## 2019-07-27 ENCOUNTER — OFFICE VISIT (OUTPATIENT)
Dept: FAMILY MEDICINE CLINIC | Age: 54
End: 2019-07-27

## 2019-07-27 VITALS
SYSTOLIC BLOOD PRESSURE: 109 MMHG | TEMPERATURE: 98.1 F | HEART RATE: 73 BPM | DIASTOLIC BLOOD PRESSURE: 60 MMHG | OXYGEN SATURATION: 99 % | WEIGHT: 293 LBS | BODY MASS INDEX: 47.09 KG/M2 | RESPIRATION RATE: 20 BRPM | HEIGHT: 66 IN
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
## 2019-07-27 DIAGNOSIS — L03.114 CELLULITIS OF LEFT UPPER EXTREMITY: Primary | ICD-10-CM

## 2019-07-27 RX ORDER — CLINDAMYCIN HYDROCHLORIDE 300 MG/1
300 CAPSULE ORAL 3 TIMES DAILY
Qty: 21 CAP | Refills: 0 | Status: SHIPPED | OUTPATIENT
Start: 2019-07-27 | End: 2019-08-03

## 2019-07-27 NOTE — PATIENT INSTRUCTIONS

## 2019-07-27 NOTE — PROGRESS NOTES
Chief Complaint   Patient presents with    Dog Bite     it happened thursday night      Spoke to patient Identified pt with two pt identifiers (Name @ )    1. Have you been to the ER, urgent care clinic since your last visit? Hospitalized since your last visit? No    2. Have you seen or consulted any other health care providers outside of the [address] [place] [address] since your last visit? Include any pap smears or colon screening.  No     \"REVIEWED RECORD IN PREPARATION FOR VISIT AND HAVE OBTAINED NECESSARY DOCUMENTATION\"

## 2019-07-27 NOTE — PROGRESS NOTES
Assessment/Plan:     Diagnoses and all orders for this visit:    1. Cellulitis of left upper extremity  -     clindamycin (CLEOCIN) 300 mg capsule; Take 1 Cap by mouth three (3) times daily for 7 days. Treatment as above. Follow up in one week if no improvement. Discussed otc treatment and symptomatic care as well. 2. Dog bite, initial encounter  -     clindamycin (CLEOCIN) 300 mg capsule; Take 1 Cap by mouth three (3) times daily for 7 days. She is up to date on vaccinations. Follow-up and Dispositions    · Return if symptoms worsen or fail to improve. Discussed expected course/resolution/complications of diagnosis in detail with patient. Medication risks/benefits/costs/interactions/alternatives discussed with patient. Pt was given after visit summary which includes diagnoses, current medications & vitals. Pt expressed understanding with the diagnosis and plan          Subjective:      Marcia Ovalles is a 46 y.o. female who presents for had concerns including Dog Bite (it happened thursday night ). Reports a dog bite from her own pet sustained two days ago. Reports associated swelling, heat, and redness which has worsened over time. Had to put her pet down this morning due to age related health complications. Denies fever, chills, body aches. The pet was rabies vaccinated. She is up to date on TDAP. She has not attempted otc treatment. This is her first evaluation.      Patient Active Problem List   Diagnosis Code    Colorectal Cancer- radiation& chemo after partial colectomy-2008 C19    Adjustment disorder with mixed anxiety and depressed mood F43.23    Elevated triglycerides with high cholesterol E78.2    Vitamin D deficiency E55.9    Primary osteoarthritis involving multiple joints M15.0    Encounter for immunization Z23    HTN (hypertension), benign I10    Arthralgia of left hip M25.552    Morbid obesity with body mass index (BMI) of 45.0 to 49.9 in adult [facility]) E66.01, Z68.42       Current Outpatient Medications   Medication Sig Dispense Refill    clindamycin (CLEOCIN) 300 mg capsule Take 1 Cap by mouth three (3) times daily for 7 days. 21 Cap 0    lisinopril (PRINIVIL, ZESTRIL) 10 mg tablet TAKE ONE TABLET BY MOUTH EVERY DAY 30 Tab 0    meloxicam (MOBIC) 15 mg tablet TAKE ONE TABLET BY MOUTH EVERY DAY 30 Tab 0    FLUoxetine (PROZAC) 20 mg capsule TAKE ONE CAPSULE BY MOUTH EVERY DAY. 30 Cap 11    omeprazole (PRILOSEC) 20 mg capsule TAKE ONE CAPSULE BY MOUTH DAILY 90 Cap 3    potassium 99 mg tablet Take 99 mg by mouth daily.  MAGNESIUM CARBONATE 250 Units by Does Not Apply route daily.  calcium 500 mg tab Take  by mouth daily.  Cholecalciferol, Vitamin D3, (VITAMIN D3) 1,000 unit cap Take 4,000 Units by mouth daily (after breakfast). Allergies   Allergen Reactions    Sulfa (Sulfonamide Antibiotics) Hives       ROS:   Review of Systems   Constitutional: Negative for chills, fever and malaise/fatigue. HENT: Negative for congestion, ear pain, sinus pain and sore throat. Respiratory: Negative for cough, sputum production, shortness of breath and wheezing. Cardiovascular: Negative for chest pain. Skin: Positive for rash. Neurological: Negative for seizures. Endo/Heme/Allergies: Negative for environmental allergies. Objective:     Visit Vitals  /60 (BP 1 Location: Right arm, BP Patient Position: Sitting)   Pulse 73   Temp 98.1 °F (36.7 °C) (Oral)   Resp 20   Ht 5' 6\" (1.676 m)   Wt 294 lb 12.8 oz (133.7 kg)   SpO2 99%   BMI 47.58 kg/m²       Vitals and Nurse Documentation reviewed. Physical Exam   Constitutional: No distress. Cardiovascular: S1 normal and S2 normal. Exam reveals no gallop and no friction rub. No murmur heard. Pulmonary/Chest: Breath sounds normal. No respiratory distress. Skin: Skin is warm and dry. There is erythema (cellulitis). Two puncture wounds to the left posterior forearm. Surrounding erythema     Psychiatric: Mood and affect normal.

## 2019-08-06 NOTE — PROGRESS NOTES
GREAT NEWS!! All of your recent lab tests are normal or at goal except borderline cholesterol numbers. No diabetes, normal liver and kidney tests. Cholesterol goals:LDL goal<130,HDL goal>45, Triglyceride goal<150     I would continue everything the same and we will see you at your next scheduled follow up in January  Weight loss is only recommendation for the cholesterol/ triglycerides at the present time. A statin will be recommended at about the age of 60 due to increased  Cardiac risk at that time( mainly due to age.

## 2019-08-23 DIAGNOSIS — M15.9 PRIMARY OSTEOARTHRITIS INVOLVING MULTIPLE JOINTS: ICD-10-CM

## 2019-08-23 RX ORDER — LISINOPRIL 10 MG/1
TABLET ORAL
Qty: 30 TAB | Refills: 2 | Status: SHIPPED | OUTPATIENT
Start: 2019-08-23 | End: 2020-01-28 | Stop reason: SDUPTHER

## 2019-08-23 RX ORDER — MELOXICAM 15 MG/1
TABLET ORAL
Qty: 30 TAB | Refills: 2 | Status: SHIPPED | OUTPATIENT
Start: 2019-08-23 | End: 2019-12-03 | Stop reason: SDUPTHER

## 2019-08-23 NOTE — TELEPHONE ENCOUNTER
PCP: Aislinn Marks MD    Last appt: 7/27/2019  Future Appointments   Date Time Provider Jere Theodore   10/14/2019 12:45 PM [facility] JENNIFER 1 [address] [facility]   10/14/2019  1:00 PM [facility]   1/28/2020  8:30 AM Aislinn Marks MD [facility] SCHED       Requested Prescriptions     Pending Prescriptions Disp Refills    lisinopril (PRINIVIL, ZESTRIL) 10 mg tablet [Pharmacy Med Name: LISINOPRIL 10 MG TAB>] 30 Tab 0     Sig: TAKE ONE TABLET BY MOUTH EVERY DAY.  meloxicam (MOBIC) 15 mg tablet [Pharmacy Med Name: MELOXICAM 15 MG TAB>] 30 Tab 0     Sig: TAKE ONE TABLET BY MOUTH EVERY DAY.

## 2019-10-14 ENCOUNTER — HOSPITAL ENCOUNTER (OUTPATIENT)
Dept: MAMMOGRAPHY | Age: 54
Discharge: HOME | End: 2019-10-14
Attending: FAMILY MEDICINE
Payer: COMMERCIAL

## 2019-10-14 DIAGNOSIS — Z12.39 BREAST SCREENING: ICD-10-CM

## 2019-10-14 DIAGNOSIS — Z13.820 OSTEOPOROSIS SCREENING: ICD-10-CM

## 2019-10-14 PROCEDURE — 77080 DXA BONE DENSITY AXIAL: CPT

## 2019-10-14 PROCEDURE — 77067 SCR MAMMO BI INCL CAD: CPT

## 2019-12-03 DIAGNOSIS — M15.9 PRIMARY OSTEOARTHRITIS INVOLVING MULTIPLE JOINTS: ICD-10-CM

## 2019-12-03 RX ORDER — MELOXICAM 15 MG/1
TABLET ORAL
Qty: 30 TAB | Refills: 1 | Status: SHIPPED | OUTPATIENT
Start: 2019-12-03 | End: 2020-02-06

## 2019-12-03 RX ORDER — LISINOPRIL 10 MG/1
TABLET ORAL
Qty: 30 TAB | Refills: 1 | Status: SHIPPED | OUTPATIENT
Start: 2019-12-03 | End: 2020-01-28 | Stop reason: SDUPTHER

## 2019-12-03 NOTE — TELEPHONE ENCOUNTER
PCP: Sumeet Gutierres MD    Last appt: 7/27/2019  Future Appointments   Date Time Provider Jere Theodore   1/28/2020  8:30 AM [facility]       Requested Prescriptions     Pending Prescriptions Disp Refills    meloxicam (MOBIC) 15 mg tablet [Pharmacy Med Name: MELOXICAM 15 MG TAB>] 30 Tab 0     Sig: TAKE ONE TABLET BY MOUTH EVERY DAY.  lisinopril (PRINIVIL, ZESTRIL) 10 mg tablet [Pharmacy Med Name: LISINOPRIL 10 MG TAB>] 30 Tab 0     Sig: TAKE ONE TABLET BY MOUTH EVERY DAY.

## 2020-01-28 ENCOUNTER — OFFICE VISIT (OUTPATIENT)
Dept: FAMILY MEDICINE CLINIC | Age: 55
End: 2020-01-28

## 2020-01-28 VITALS
HEIGHT: 66 IN | WEIGHT: 293 LBS | SYSTOLIC BLOOD PRESSURE: 140 MMHG | RESPIRATION RATE: 16 BRPM | HEART RATE: 88 BPM | DIASTOLIC BLOOD PRESSURE: 78 MMHG | OXYGEN SATURATION: 99 % | TEMPERATURE: 98.4 F | BODY MASS INDEX: 47.09 KG/M2

## 2020-01-28 DIAGNOSIS — E66.01 MORBID OBESITY WITH BODY MASS INDEX (BMI) OF 45.0 TO 49.9 IN ADULT (HCC): ICD-10-CM

## 2020-01-28 DIAGNOSIS — C19 COLORECTAL CANCER (HCC): ICD-10-CM

## 2020-01-28 DIAGNOSIS — E78.2 ELEVATED TRIGLYCERIDES WITH HIGH CHOLESTEROL: ICD-10-CM

## 2020-01-28 DIAGNOSIS — E78.5 DYSLIPIDEMIA, GOAL LDL BELOW 130: ICD-10-CM

## 2020-01-28 DIAGNOSIS — E55.9 VITAMIN D DEFICIENCY: ICD-10-CM

## 2020-01-28 DIAGNOSIS — I10 HTN (HYPERTENSION), BENIGN: Primary | ICD-10-CM

## 2020-01-28 DIAGNOSIS — F43.23 ADJUSTMENT DISORDER WITH MIXED ANXIETY AND DEPRESSED MOOD: ICD-10-CM

## 2020-01-28 DIAGNOSIS — M15.9 PRIMARY OSTEOARTHRITIS INVOLVING MULTIPLE JOINTS: ICD-10-CM

## 2020-01-28 RX ORDER — MELOXICAM 15 MG/1
15 TABLET ORAL DAILY
COMMUNITY
End: 2020-01-28 | Stop reason: SDUPTHER

## 2020-01-28 NOTE — PROGRESS NOTES
Chief Complaint   Patient presents with    Hypertension     fasting   1. Have you been to the ER, urgent care clinic since your last visit? Hospitalized since your last visit? No    2. Have you seen or consulted any other health care providers outside of the [address] [place] [address] since your last visit? Include any pap smears or colon screening.  No

## 2020-01-28 NOTE — PROGRESS NOTES
HISTORY OF PRESENT ILLNESS  HPI  Timi Jackson is a 53 y.o. Female with a history of HTN, colorectal cancer, osteoarthritis, adjustment disorder with anxiety and depression, vitamin D deficiency and arthralgia of left hip, who presents to the office today for a follow up on her HTN. Pt reports her BP was checked in October at her Ob-gyn visit with Dr. Susan Aldridge, and it was 131/76. Pt notes her pap smears and mammograms have been normal.  Pt denies unusual SOB, chest pain, and any recent ER visits or hospitalizations. Past Medical History:   Diagnosis Date    Adjustment disorder with mixed anxiety and depressed mood     Colorectal Cancer- radiation& chemo after partial colectomy     2008    DJD (degenerative joint disease) 11/29/2010    Elevated triglycerides with high cholesterol 9/29/2014    Encounter for immunization 10/1/2015    HTN (hypertension), benign 3/14/2016    Primary osteoarthritis involving multiple joints 10/1/2015    Vitamin D deficiency 9/29/2014     Past Surgical History:   Procedure Laterality Date    HX BREAST BIOPSY Bilateral     benign bx's bilateral    HX COLECTOMY  partial for sigmoid cancer- 9/2008    MN BIOPSY OF BREAST, INCISIONAL  [id number]. Current Outpatient Medications on File Prior to Visit   Medication Sig Dispense Refill    lisinopril (PRINIVIL, ZESTRIL) 10 mg tablet TAKE ONE TABLET BY MOUTH EVERY DAY 30 Tab 0    FLUoxetine (PROZAC) 20 mg capsule TAKE ONE CAPSULE BY MOUTH EVERY DAY. 30 Cap 11    omeprazole (PRILOSEC) 20 mg capsule TAKE ONE CAPSULE BY MOUTH DAILY 90 Cap 3    potassium 99 mg tablet Take 99 mg by mouth daily.  MAGNESIUM CARBONATE 250 Units by Does Not Apply route daily.  calcium 500 mg tab Take  by mouth daily.  Cholecalciferol, Vitamin D3, (VITAMIN D3) 1,000 unit cap Take 4,000 Units by mouth daily (after breakfast).  [DISCONTINUED] meloxicam (MOBIC) 15 mg tablet Take 15 mg by mouth daily.       meloxicam (MOBIC) 15 mg tablet TAKE ONE TABLET BY MOUTH EVERY DAY. 30 Tab 1    [DISCONTINUED] lisinopril (PRINIVIL, ZESTRIL) 10 mg tablet TAKE ONE TABLET BY MOUTH EVERY DAY. 30 Tab 1    [DISCONTINUED] lisinopril (PRINIVIL, ZESTRIL) 10 mg tablet TAKE ONE TABLET BY MOUTH EVERY DAY. 30 Tab 2     No current facility-administered medications on file prior to visit. Allergies   Allergen Reactions    Sulfa (Sulfonamide Antibiotics) Hives     Family History   Problem Relation Age of Onset    Alzheimer Mother     Cancer Father         colo rectal cancer    Cancer Sister         breast left     Breast Cancer Sister 39     Social History     Socioeconomic History    Marital status:      Spouse name: Not on file    Number of children: Not on file    Years of education: Not on file    Highest education level: Not on file   Tobacco Use    Smoking status: Never Smoker    Smokeless tobacco: Never Used   Substance and Sexual Activity    Alcohol use: No    Drug use: No    Sexual activity: Never     Birth control/protection: None             Review of Systems   Constitutional: Negative for chills, diaphoresis, fever, malaise/fatigue and weight loss. Eyes: Negative for blurred vision, double vision, pain and redness. Respiratory: Negative for cough, shortness of breath and wheezing. Cardiovascular: Negative for chest pain, palpitations, orthopnea, claudication, leg swelling and PND. Skin: Negative for itching and rash. Neurological: Negative for dizziness, tingling, tremors, sensory change, speech change, focal weakness, seizures, loss of consciousness, weakness and headaches.      Results for orders placed or performed in visit on 07/22/19   LIPID PANEL   Result Value Ref Range    Cholesterol, total 206 (H) 100 - 199 mg/dL    Triglyceride 202 (H) 0 - 149 mg/dL    HDL Cholesterol 39 (L) >39 mg/dL    VLDL, calculated 40 5 - 40 mg/dL    LDL, calculated 127 (H) 0 - 99 mg/dL   METABOLIC PANEL, COMPREHENSIVE   Result Value Ref Range    Glucose 104 (H) 65 - 99 mg/dL    BUN 16 6 - 24 mg/dL    Creatinine 0.85 0.57 - 1.00 mg/dL    GFR est non-AA 78 >59 mL/min/1.73    GFR est AA 90 >59 mL/min/1.73    BUN/Creatinine ratio 19 9 - 23    Sodium 143 134 - 144 mmol/L    Potassium 5.1 3.5 - 5.2 mmol/L    Chloride 104 96 - 106 mmol/L    CO2 26 20 - 29 mmol/L    Calcium 9.5 8.7 - 10.2 mg/dL    Protein, total 6.3 6.0 - 8.5 g/dL    Albumin 4.1 3.5 - 5.5 g/dL    GLOBULIN, TOTAL 2.2 1.5 - 4.5 g/dL    A-G Ratio 1.9 1.2 - 2.2    Bilirubin, total 0.3 0.0 - 1.2 mg/dL    Alk. phosphatase 59 39 - 117 IU/L    AST (SGOT) 19 0 - 40 IU/L    ALT (SGPT) 23 0 - 32 IU/L   CVD REPORT   Result Value Ref Range    INTERPRETATION Note          Physical Exam  Visit Vitals  /78   Pulse 88   Temp 98.4 °F (36.9 °C)   Resp 16   Ht 5' 6\" (1.676 m)   Wt 299 lb (135.6 kg)   SpO2 99%   BMI 48.26 kg/m²       Head: Normocephalic, without obvious abnormality, atraumatic  Eyes: conjunctivae/corneas clear. PERRL, EOM's intact. Neck: supple, symmetrical, trachea midline, no adenopathy, thyroid: not enlarged, symmetric, no tenderness/mass/nodules, no carotid bruit and no JVD  Lungs: clear to auscultation bilaterally  Heart: regular rate and rhythm, S1, S2 normal, no murmur, click, rub or gallop  Extremities: extremities normal, atraumatic, no cyanosis or edema  Pulses: 2+ and symmetric  Lymph nodes: Cervical, supraclavicular, and axillary nodes normal.  Neurologic: Grossly normal      ASSESSMENT and PLAN    ICD-10-CM ICD-9-CM    1. HTN (hypertension), benign I10 401.1    2. Elevated triglycerides with high cholesterol E78.2 272.2 LIPID PANEL      METABOLIC PANEL, COMPREHENSIVE   3. Vitamin D deficiency E55.9 268.9    4. Primary osteoarthritis involving multiple joints M15.0 715.09    5. Morbid obesity with body mass index (BMI) of 45.0 to 49.9 in adult (HCC) E66.01 278.01     Z68.42 V85.42    6. Adjustment disorder with mixed anxiety and depressed mood F43.23 309.28    7. Dyslipidemia, goal LDL below 130 E78.5 272.4    8. Colorectal Cancer- radiation& chemo after partial colectomy-2008 C19 154.0      Diagnoses and all orders for this visit:    1. HTN (hypertension), benign    2. Elevated triglycerides with high cholesterol  -     LIPID PANEL  -     METABOLIC PANEL, COMPREHENSIVE    3. Vitamin D deficiency    4. Primary osteoarthritis involving multiple joints    5. Morbid obesity with body mass index (BMI) of 45.0 to 49.9 in adult ([facility] [address]    6. Adjustment disorder with mixed anxiety and depressed mood    7. Dyslipidemia, goal LDL below 130    8. Colorectal Cancer- radiation& chemo after partial colectomy-2008      Follow-up and Dispositions    · Return in about 6 months (around 7/28/2020), or if symptoms worsen or fail to improve, for F/U hypertension, F/U of obesity, f/u Vitamin D deficiency, F/U cholesterol, osteoarthritis. lab results and schedule of future lab studies reviewed with patient  reviewed diet, exercise and weight control  cardiovascular risk and specific lipid/LDL goals reviewed  reviewed medications and side effects in detail  Please call my office if there are any questions- [phone]. I will arrange for follow up after the lab tests done from today return  Recommended a weekly \"heart check. \" I went into detail how to do this. Call for refills on medications as needed. Discussed expected course/resolution/complications of diagnosis in detail with patient. Medication risks/benefits/costs/interactions/alternatives discussed with patient. Pt was given an after visit summary which includes diagnoses, current medications & vitals. Pt expressed understanding with the diagnosis and plan    BMI is significantly elevated- in the morbidly obese range. I reviewed diet, exercise and weight control.  Discussed weight control in detail, the importance of mainly decreased carbs, and for weight maintenance, exercise; discussed different diets and that it isn't as important to watch the type of foods as it is to decrease calorie intake no matter what type of diet you do, etc. Patient is aware that there are medications and surgery that are options to assist in weight loss. I also mentioned Melisa/Ambrosio as an effective way to lose weight. Should follow up monthly with an office visit if has interest in working on weight loss. Total 25 minutes,60 % counseling re: Recommended a weekly \"heart check. \" I went into detail how to do this. Reviewed symptoms, or lack thereof, of hypertension and elevated cholesterol. Regular exercise is very important to your health; it helps mentally, physically, socially; it prevents injuries if done properly. Exercise, even as simple as walking 20-30 minutes daily has major benefits to your health even though your \"numbers\" are the same in the lab. See if you can add this into your daily regimen and after a few months it will become a regular habit-\"just something you do,\" like brushing your teeth. A combination of aerobic exercise and strengthening and stretching is felt to be the best for you, so this should be your ultimate goal.   This can be done in the privacy of your home or in a group setting as at the gym  Some prefer having a , others prefer to do exercise in groups or individually. Do what \"works\" for you. You need to make it simple and \"fun,\" or you most likely will not continue it. Review of  the proper technique of checking the blood pressure- check it on an average day only, not on a stressful day, sitting, no exercise for at least 1 hour and not experiencing any new pain( chronic pain is OK). Patient encouraged to check BP sitting and standing at least once a month and to report these readings to me if > 140/ 90 on average , or if the standing BP is >  15 points lower than the sitting.      Always check it twice and if there is > 5 points decrease from the previous reading( top reading or systolic) keep checking it until it does not drop 5 points. Write only this final reading down, not the preceding readings. If out of these readings there is only 1 out of 4 or less > 063, or > 90 diastolic then your blood pressure is OK; it needs further treatment if it is above this. Also, don't forget,  as noted above, to check your blood pressure standing once a month; this is to detect a drop in your BP that might lead to fainting and serious injury; you check it standing with your arm hanging straight down and relaxed. Check it twice waiting 1 minute between the two readings. If, with either one of these 2 readings there is a > 15 point drop of the systolic compared to your sitting pressure( done before the standing BP), then let me know. Following these guidelines, continue to check your BP and write down only the ones described above and it will help me to effectively treat your blood pressure. Also, discussed symptoms of concern that were noted today in the note above, treatment options( including doing nothing), when to follow up before recommended time frame. Also, answered all questions. Overall her weight is down even though she has gained a bit recently, encouraged her to continue working on that. This document was written by Kinjal Ghotra, as dictated by Jean Razo MD.  I have reviewed and agree with the above note and have made corrections where appropriate Adalberto Foster M.D.

## 2020-01-29 LAB
ALBUMIN SERPL-MCNC: 4.2 G/DL (ref 3.8–4.9)
ALBUMIN/GLOB SERPL: 1.9 {RATIO} (ref 1.2–2.2)
ALP SERPL-CCNC: 61 IU/L (ref 39–117)
ALT SERPL-CCNC: 27 IU/L (ref 0–32)
AST SERPL-CCNC: 26 IU/L (ref 0–40)
BILIRUB SERPL-MCNC: 0.3 MG/DL (ref 0–1.2)
BUN SERPL-MCNC: 15 MG/DL (ref 6–24)
BUN/CREAT SERPL: 21 (ref 9–23)
CALCIUM SERPL-MCNC: 9.3 MG/DL (ref 8.7–10.2)
CHLORIDE SERPL-SCNC: 103 MMOL/L (ref 96–106)
CHOLEST SERPL-MCNC: 186 MG/DL (ref 100–199)
CO2 SERPL-SCNC: 25 MMOL/L (ref 20–29)
CREAT SERPL-MCNC: 0.72 MG/DL (ref 0.57–1)
GLOBULIN SER CALC-MCNC: 2.2 G/DL (ref 1.5–4.5)
GLUCOSE SERPL-MCNC: 95 MG/DL (ref 65–99)
HDLC SERPL-MCNC: 42 MG/DL
INTERPRETATION, 910389: NORMAL
LDLC SERPL CALC-MCNC: 108 MG/DL (ref 0–99)
POTASSIUM SERPL-SCNC: 4.7 MMOL/L (ref 3.5–5.2)
PROT SERPL-MCNC: 6.4 G/DL (ref 6–8.5)
SODIUM SERPL-SCNC: 142 MMOL/L (ref 134–144)
TRIGL SERPL-MCNC: 179 MG/DL (ref 0–149)
VLDLC SERPL CALC-MCNC: 36 MG/DL (ref 5–40)

## 2020-02-06 DIAGNOSIS — M15.9 PRIMARY OSTEOARTHRITIS INVOLVING MULTIPLE JOINTS: ICD-10-CM

## 2020-02-06 DIAGNOSIS — I10 HTN (HYPERTENSION), BENIGN: ICD-10-CM

## 2020-02-06 RX ORDER — LISINOPRIL 10 MG/1
TABLET ORAL
Qty: 30 TAB | Refills: 2 | Status: SHIPPED | OUTPATIENT
Start: 2020-02-06 | End: 2020-05-06

## 2020-02-06 RX ORDER — MELOXICAM 15 MG/1
TABLET ORAL
Qty: 30 TAB | Refills: 1 | Status: SHIPPED | OUTPATIENT
Start: 2020-02-06 | End: 2020-04-08

## 2020-03-20 ENCOUNTER — OFFICE VISIT (OUTPATIENT)
Dept: URGENT CARE | Age: 55
End: 2020-03-20

## 2020-03-20 VITALS
HEART RATE: 78 BPM | SYSTOLIC BLOOD PRESSURE: 129 MMHG | TEMPERATURE: 98.6 F | OXYGEN SATURATION: 98 % | DIASTOLIC BLOOD PRESSURE: 83 MMHG | BODY MASS INDEX: 48.82 KG/M2 | WEIGHT: 293 LBS | RESPIRATION RATE: 16 BRPM | HEIGHT: 65 IN

## 2020-03-20 DIAGNOSIS — J06.9 VIRAL UPPER RESPIRATORY INFECTION: Primary | ICD-10-CM

## 2020-03-20 DIAGNOSIS — J02.9 SORE THROAT: ICD-10-CM

## 2020-03-20 LAB
FLUAV+FLUBV AG NOSE QL IA.RAPID: NEGATIVE POS/NEG
FLUAV+FLUBV AG NOSE QL IA.RAPID: NEGATIVE POS/NEG
S PYO AG THROAT QL: NEGATIVE
VALID INTERNAL CONTROL?: YES
VALID INTERNAL CONTROL?: YES

## 2020-03-20 NOTE — PATIENT INSTRUCTIONS
Viral Respiratory Infection: Care Instructions  Your Care Instructions    Viruses are very small organisms. They grow in number after they enter your body. There are many types that cause different illnesses, such as colds and the mumps. The symptoms of a viral respiratory infection often start quickly. They include a fever, sore throat, and runny nose. You may also just not feel well. Or you may not want to eat much. Most viral respiratory infections are not serious. They usually get better with time and self-care. Antibiotics are not used to treat a viral infection. That's because antibiotics will not help cure a viral illness. In some cases, antiviral medicine can help your body fight a serious viral infection. Follow-up care is a key part of your treatment and safety. Be sure to make and go to all appointments, and call your doctor if you are having problems. It's also a good idea to know your test results and keep a list of the medicines you take. How can you care for yourself at home? · Rest as much as possible until you feel better. · Be safe with medicines. Take your medicine exactly as prescribed. Call your doctor if you think you are having a problem with your medicine. You will get more details on the specific medicine your doctor prescribes. · Take an over-the-counter pain medicine, such as acetaminophen (Tylenol), ibuprofen (Advil, Motrin), or naproxen (Aleve), as needed for pain and fever. Read and follow all instructions on the label. Do not give aspirin to anyone younger than 20. It has been linked to Reye syndrome, a serious illness. · Drink plenty of fluids, enough so that your urine is light yellow or clear like water. Hot fluids, such as tea or soup, may help relieve congestion in your nose and throat. If you have kidney, heart, or liver disease and have to limit fluids, talk with your doctor before you increase the amount of fluids you drink.   · Try to clear mucus from your lungs by breathing deeply and coughing. · Gargle with warm salt water once an hour. This can help reduce swelling and throat pain. Use 1 teaspoon of salt mixed in 1 cup of warm water. · Do not smoke or allow others to smoke around you. If you need help quitting, talk to your doctor about stop-smoking programs and medicines. These can increase your chances of quitting for good. To avoid spreading the virus  · Cough or sneeze into a tissue. Then throw the tissue away. · If you don't have a tissue, use your hand to cover your cough or sneeze. Then clean your hand. You can also cough into your sleeve. · Wash your hands often. Use soap and warm water. Wash for 15 to 20 seconds each time. · If you don't have soap and water near you, you can clean your hands with alcohol wipes or gel. When should you call for help? Call your doctor now or seek immediate medical care if:    · You have a new or higher fever.     · Your fever lasts more than 48 hours.     · You have trouble breathing.     · You have a fever with a stiff neck or a severe headache.     · You are sensitive to light.     · You feel very sleepy or confused.    Watch closely for changes in your health, and be sure to contact your doctor if:    · You do not get better as expected. Where can you learn more? Go to http://adali-carly.info/  Enter U643 in the search box to learn more about \"Viral Respiratory Infection: Care Instructions. \"  Current as of: June 9, 2019Content Version: 12.4  © 9549-6532 [employer]. Care instructions adapted under license by [employer] (which disclaims liability or warranty for this information). If you have questions about a medical condition or this instruction, always ask your healthcare professional. [address] any warranty or liability for your use of this information.

## 2020-03-20 NOTE — LETTER
[address] 
[facility] [place] [address] 
[phone] Work/School Note Date: 3/20/2020 To Whom It May concern: 
 
Dakota Robison was seen and treated today in the urgent care center by Dr Gabriele Alvarado. Patient was advised to self quarantine for 2 weeks. Making return to work, 4/3/20 Sincerely, Ledora Dandy, RN

## 2020-03-20 NOTE — PROGRESS NOTES
Cold Symptoms   The history is provided by the patient. This is a new problem. The current episode started 2 days ago. The problem occurs constantly. The problem has not changed since onset. The cough is non-productive. There has been no fever. Associated symptoms include rhinorrhea and sore throat. Pertinent negatives include no chest pain, no chills, no sweats, no ear congestion, no ear pain, no headaches, no myalgias, no shortness of breath, no wheezing, no nausea and no vomiting. She is not a smoker. Past Medical History:   Diagnosis Date    Adjustment disorder with mixed anxiety and depressed mood     Colorectal Cancer- radiation& chemo after partial colectomy     2008    DJD (degenerative joint disease) 11/29/2010    Elevated triglycerides with high cholesterol 9/29/2014    Encounter for immunization 10/1/2015    HTN (hypertension), benign 3/14/2016    Primary osteoarthritis involving multiple joints 10/1/2015    Vitamin D deficiency 9/29/2014        Past Surgical History:   Procedure Laterality Date    HX BREAST BIOPSY Bilateral     benign bx's bilateral    HX COLECTOMY  partial for sigmoid cancer- 9/2008    CA BIOPSY OF BREAST, INCISIONAL  [id number].          Family History   Problem Relation Age of Onset    Alzheimer Mother    Gutierrez Cancer Father         colo rectal cancer    Cancer Sister         breast left     Breast Cancer Sister 39        Social History     Socioeconomic History    Marital status:      Spouse name: Not on file    Number of children: Not on file    Years of education: Not on file    Highest education level: Not on file   Occupational History    Not on file   Social Needs    Financial resource strain: Not on file    Food insecurity     Worry: Not on file     Inability: Not on file    Transportation needs     Medical: Not on file     Non-medical: Not on file   Tobacco Use    Smoking status: Never Smoker    Smokeless tobacco: Never Used   Substance and Sexual Activity    Alcohol use: No    Drug use: No    Sexual activity: Never     Birth control/protection: None   Lifestyle    Physical activity     Days per week: Not on file     Minutes per session: Not on file    Stress: Not on file   Relationships    Social connections     Talks on phone: Not on file     Gets together: Not on file     Attends Rastafarian service: Not on file     Active member of club or organization: Not on file     Attends meetings of clubs or organizations: Not on file     Relationship status: Not on file    Intimate partner violence     Fear of current or ex partner: Not on file     Emotionally abused: Not on file     Physically abused: Not on file     Forced sexual activity: Not on file   Other Topics Concern    Not on file   Social History Narrative    Not on file                ALLERGIES: Sulfa (sulfonamide antibiotics)    Review of Systems   Constitutional: Negative for activity change, appetite change, chills and fever. HENT: Positive for congestion, rhinorrhea and sore throat. Negative for ear pain, sinus pressure, sinus pain and trouble swallowing. Respiratory: Positive for cough. Negative for shortness of breath and wheezing. Cardiovascular: Negative for chest pain and palpitations. Gastrointestinal: Negative for nausea and vomiting. Musculoskeletal: Negative for myalgias. Neurological: Negative for dizziness and headaches. Hematological: Negative for adenopathy. Vitals:    03/20/20 1257   BP: 129/83   Pulse: 78   Resp: 16   Temp: 98.6 °F (37 °C)   SpO2: 98%   Weight: 296 lb (134.3 kg)   Height: 5' 5\" (1.651 m)       Physical Exam  Vitals signs and nursing note reviewed. Constitutional:       General: She is not in acute distress. Appearance: She is well-developed. She is not diaphoretic.    HENT:      Right Ear: Tympanic membrane, ear canal and external ear normal.      Left Ear: Tympanic membrane, ear canal and external ear normal.      Nose: Congestion and rhinorrhea present. Right Sinus: No maxillary sinus tenderness or frontal sinus tenderness. Left Sinus: No maxillary sinus tenderness or frontal sinus tenderness. Mouth/Throat:      Pharynx: No oropharyngeal exudate or posterior oropharyngeal erythema. Tonsils: No tonsillar abscesses. Cardiovascular:      Rate and Rhythm: Normal rate and regular rhythm. Heart sounds: Normal heart sounds. Pulmonary:      Effort: Pulmonary effort is normal. No respiratory distress. Breath sounds: Normal breath sounds. No wheezing or rales. Lymphadenopathy:      Cervical: No cervical adenopathy. Neurological:      Mental Status: She is alert. Psychiatric:         Behavior: Behavior normal.         Thought Content: Thought content normal.         Judgment: Judgment normal.         MDM    ICD-10-CM ICD-9-CM   1. Viral upper respiratory infection J06.9 465.9   2. Sore throat J02.9 462       Orders Placed This Encounter    AMB POC ASHLI INFLUENZA A/B TEST    AMB POC RAPID STREP A        OTC Robitussin DM prn  Fluids  Stay home    If signs and symptoms become worse the pt is to go to the ER.      Results for orders placed or performed in visit on 03/20/20   AMB POC ASHLI INFLUENZA A/B TEST   Result Value Ref Range    VALID INTERNAL CONTROL POC Yes     Influenza A Ag POC Negative Negative Pos/Neg    Influenza B Ag POC Negative Negative Pos/Neg   AMB POC RAPID STREP A   Result Value Ref Range    VALID INTERNAL CONTROL POC Yes     Group A Strep Ag Negative Negative       Procedures

## 2020-04-20 ENCOUNTER — TELEPHONE (OUTPATIENT)
Dept: FAMILY MEDICINE CLINIC | Age: 55
End: 2020-04-20

## 2020-04-20 NOTE — TELEPHONE ENCOUNTER
----- Message from 56Curtis Medina sent at 4/20/2020 11:56 AM EDT -----  Regarding: Dr. Ybarra Loges first and last name: Pt  Reason for call: Pt has an ingrown toenail which is infected and would like something for it.   Callback required yes/no and why: y  Best contact number(s): [phone]  Details to clarify the request: n/a

## 2020-04-21 ENCOUNTER — VIRTUAL VISIT (OUTPATIENT)
Dept: FAMILY MEDICINE CLINIC | Age: 55
End: 2020-04-21

## 2020-04-21 DIAGNOSIS — L60.0 INGROWN NAIL OF GREAT TOE OF RIGHT FOOT: Primary | ICD-10-CM

## 2020-04-21 DIAGNOSIS — F43.23 ADJUSTMENT DISORDER WITH MIXED ANXIETY AND DEPRESSED MOOD: ICD-10-CM

## 2020-04-21 DIAGNOSIS — I10 HTN (HYPERTENSION), BENIGN: ICD-10-CM

## 2020-04-21 RX ORDER — CEPHALEXIN 500 MG/1
500 CAPSULE ORAL 4 TIMES DAILY
Qty: 40 CAP | Refills: 0 | Status: SHIPPED | OUTPATIENT
Start: 2020-04-21 | End: 2020-05-01

## 2020-04-21 NOTE — PROGRESS NOTES
Chief Complaint   Patient presents with    Nail Problem     right foot red swollen some drainage cut nails 2 weeks ago    1. Have you been to the ER, urgent care clinic since your last visit? Hospitalized since your last visit? No    2. Have you seen or consulted any other health care providers outside of the [address] [place] [address] since your last visit? Include any pap smears or colon screening.  No

## 2020-04-21 NOTE — PROGRESS NOTES
HISTORY OF PRESENT ILLNESS  HPI  Lucio Carver a 53 y.o. Female with a history of HTN, colorectal cancer, osteoarthritis, adjustment disorder with anxiety and depression, vitamin D deficiency and arthralgia of left hip. Pt is seen today through virtual visit for a nail problem. Pt complains of an infected right big toe. She notes she trimmed an ingrown toenail a few days ago and after this she started getting some pain in the area. Pt also mentions the area is swollen with some drainage. Pt denies unusual SOB, chest pain, and any recent ER visits or hospitalizations. Consent: Delores Ortiz, who was seen by synchronous (real-time) audio-video technology, and/or her healthcare decision maker, is aware that this patient-initiated, Telehealth encounter on 4/21/2020 is a billable service, with coverage as determined by her insurance carrier. She is aware that she may receive a bill and has provided verbal consent to proceed: Yes. Delores Ortiz is a 53 y.o. female being evaluated by a video visit encounter for concerns as above. A caregiver was present when appropriate. Due to this being a TeleHealth encounter (During AYSTS-37 public health emergency), evaluation of the following organ systems was limited: Vitals/Constitutional/EENT/Resp/CV/GI//MS/Neuro/Skin/Heme-Lymph-Imm. Pursuant to the emergency declaration under the [facility], 1135 waiver authority and the [employer] and [employer] Act, this Virtual  Visit was conducted, with patient's (and/or legal guardian's) consent, to reduce the patient's risk of exposure to COVID-19 and provide necessary medical care. Services were provided through a video synchronous discussion virtually to substitute for in-person clinic visit. Patient and provider were located at their individual homes.         Past Medical History:   Diagnosis Date    Adjustment disorder with mixed anxiety and depressed mood     Colorectal Cancer- radiation& chemo after partial colectomy     2008    DJD (degenerative joint disease) 11/29/2010    Elevated triglycerides with high cholesterol 9/29/2014    Encounter for immunization 10/1/2015    HTN (hypertension), benign 3/14/2016    Primary osteoarthritis involving multiple joints 10/1/2015    Vitamin D deficiency 9/29/2014     Past Surgical History:   Procedure Laterality Date    HX BREAST BIOPSY Bilateral     benign bx's bilateral    HX COLECTOMY  partial for sigmoid cancer- 9/2008    MT BIOPSY OF BREAST, INCISIONAL  [id number]. Current Outpatient Medications on File Prior to Visit   Medication Sig Dispense Refill    meloxicam (MOBIC) 15 mg tablet TAKE ONE TABLET BY MOUTH ONCE DAILY. 90 Tab 1    FLUoxetine (PROzac) 20 mg capsule TAKE ONE CAPSULE BY MOUTH ONCE DAILY. 90 Cap 1    lisinopril (PRINIVIL, ZESTRIL) 10 mg tablet TAKE ONE TABLET BY MOUTH ONCE DAILY. 30 Tab 2    omeprazole (PRILOSEC) 20 mg capsule TAKE ONE CAPSULE BY MOUTH DAILY 90 Cap 3    potassium 99 mg tablet Take 99 mg by mouth daily.  MAGNESIUM CARBONATE 250 Units by Does Not Apply route daily.  calcium 500 mg tab Take  by mouth daily.  Cholecalciferol, Vitamin D3, (VITAMIN D3) 1,000 unit cap Take 4,000 Units by mouth daily (after breakfast). No current facility-administered medications on file prior to visit.       Allergies   Allergen Reactions    Sulfa (Sulfonamide Antibiotics) Hives     Family History   Problem Relation Age of Onset    Alzheimer Mother     Cancer Father         colo rectal cancer    Cancer Sister         breast left     Breast Cancer Sister 39     Social History     Socioeconomic History    Marital status:      Spouse name: Not on file    Number of children: Not on file    Years of education: Not on file    Highest education level: Not on file   Tobacco Use    Smoking status: Never Smoker    Smokeless tobacco: Never Used   Substance and Sexual Activity    Alcohol use: No    Drug use: No    Sexual activity: Never     Birth control/protection: None             Review of Systems   Constitutional: Negative for chills, diaphoresis, fever, malaise/fatigue and weight loss. Eyes: Negative for blurred vision, double vision, pain and redness. Respiratory: Negative for cough, shortness of breath and wheezing. Cardiovascular: Negative for chest pain, palpitations, orthopnea, claudication, leg swelling and PND. Skin: Negative for itching and rash. Neurological: Negative for dizziness, tingling, tremors, sensory change, speech change, focal weakness, seizures, loss of consciousness, weakness and headaches. Results for orders placed or performed in visit on 03/20/20   AMB POC ASHLI INFLUENZA A/B TEST   Result Value Ref Range    VALID INTERNAL CONTROL POC Yes     Influenza A Ag POC Negative Negative Pos/Neg    Influenza B Ag POC Negative Negative Pos/Neg   AMB POC RAPID STREP A   Result Value Ref Range    VALID INTERNAL CONTROL POC Yes     Group A Strep Ag Negative Negative         Physical Exam  There were no vitals taken for this visit. General: alert, cooperative, no distress   Mental  status: normal mood, behavior, speech, dress, motor activity, and thought processes, able to follow commands   HENT: NCAT   Neck: no visualized mass   Resp: no respiratory distress   Neuro: no gross deficits   Skin: no discoloration or lesions of concern on visible areas   Psychiatric: normal affect, consistent with stated mood, no evidence of hallucinations       ASSESSMENT and PLAN    ICD-10-CM ICD-9-CM    1. Ingrown nail of great toe of right foot L60.0 703.0 cephALEXin (KEFLEX) 500 mg capsule   2. HTN (hypertension), benign I10 401.1    3. Adjustment disorder with mixed anxiety and depressed mood F43.23 309.28      Diagnoses and all orders for this visit:    1.  Ingrown nail of great toe of right foot  -     cephALEXin (KEFLEX) 500 mg capsule; Take 1 Cap by mouth four (4) times daily for 10 days. 2. HTN (hypertension), benign    3. Adjustment disorder with mixed anxiety and depressed mood      Follow-up and Dispositions    · Return in about 4 months (around 8/21/2020), or if ingrown great toenail does not improve, for F/U HTN and CHOL, F/U of obesity. reviewed medications and side effects in detail  Please call my office if there are any questions- [phone]. Discussed expected course/resolution/complications of diagnosis in detail with patient. Medication risks/benefits/costs/interactions/alternatives discussed with patient. Pt was given an after visit summary which includes diagnoses, current medications & vitals. Pt expressed understanding with the diagnosis and plan. Patient to call if no better in 3 -4 days and prn new problems. BMI is significantly elevated- in the morbidly obese range. I reviewed diet, exercise and weight control. Discussed weight control in detail, the importance of mainly decreased carbs, and for weight maintenance, exercise; discussed different diets and that it isn't as important to watch the type of foods as it is to decrease calorie intake no matter what type of diet you do, etc. Patient is aware that there are medications and surgery that are options to assist in weight loss. I also mentioned Melisa/Ambrosio as an effective way to lose weight. Should follow up monthly with an office visit if has interest in working on weight loss. Total 25 minutes,60 % counseling re: Review of  the proper technique of checking the blood pressure- check it on an average day only, not on a stressful day, sitting, no exercise for at least 1 hour and not experiencing any new pain( chronic pain is OK). Patient encouraged to check BP sitting and standing at least once a month and to report these readings to me if > 140/ 90 on average , or if the standing BP is >  15 points lower than the sitting.      Always check it twice and if there is > 5 points decrease from the previous reading( top reading or systolic) keep checking it until it does not drop 5 points. Write only this final reading down, not the preceding readings. If out of these readings there is only 1 out of 4 or less > 184, or > 90 diastolic then your blood pressure is OK; it needs further treatment if it is above this. Also, don't forget,  as noted above, to check your blood pressure standing once a month; this is to detect a drop in your BP that might lead to fainting and serious injury; you check it standing with your arm hanging straight down and relaxed. Check it twice waiting 1 minute between the two readings. If, with either one of these 2 readings there is a > 15 point drop of the systolic compared to your sitting pressure( done before the standing BP), then let me know. Following these guidelines, continue to check your BP and write down only the ones described above and it will help me to effectively treat your blood pressure. Recommended a weekly \"heart check. \" I went into detail how to do this. Regular exercise is very important to your health; it helps mentally, physically, socially; it prevents injuries if done properly. Exercise, even as simple as walking 20-30 minutes daily has major benefits to your health even though your \"numbers\" are the same in the lab. See if you can add this into your daily regimen and after a few months it will become a regular habit-\"just something you do,\" like brushing your teeth. A combination of aerobic exercise and strengthening and stretching is felt to be the best for you, so this should be your ultimate goal.   This can be done in the privacy of your home or in a group setting as at the gym  Some prefer having a , others prefer to do exercise in groups or individually. Do what \"works\" for you. You need to make it simple and \"fun,\" or you most likely will not continue it.     Reviewed symptoms, or lack thereof, of hypertension and elevated cholesterol. Also, discussed symptoms of concern that were noted today in the note above, treatment options( including doing nothing), when to follow up before recommended time frame. Also, answered all questions. Pt has classic symptoms for right ingrown toenail medially. Talked with her about how to prevent recurrence by pushing the skin away from the nail edge on a daily basis after the area becomes less painful. Checked her chart and I don't see Augmentin but I do see Keflex on it, so I will give her that. Encouraged warm soaks at least TID until it is pain free. Encouraged her to protect herself from the COVID virus by using a mask when in public. Since she may have had the COVID virus, we will test for it in a couple of months when antibody tests become ore available. This document was written by Freddy Hunter, as dictated by Harvey Reyes MD.  I have reviewed and agree with the above note and have made corrections where appropriate Adalberto Goodwin M.D.

## 2020-05-06 DIAGNOSIS — I10 HTN (HYPERTENSION), BENIGN: ICD-10-CM

## 2020-05-06 DIAGNOSIS — Z79.1 LONG TERM (CURRENT) USE OF NON-STEROIDAL ANTI-INFLAMMATORIES (NSAID): ICD-10-CM

## 2020-05-06 RX ORDER — OMEPRAZOLE 20 MG/1
CAPSULE, DELAYED RELEASE ORAL
Qty: 30 CAP | Refills: 3 | Status: SHIPPED | OUTPATIENT
Start: 2020-05-06 | End: 2020-09-11

## 2020-05-06 RX ORDER — LISINOPRIL 10 MG/1
TABLET ORAL
Qty: 30 TAB | Refills: 3 | Status: SHIPPED | OUTPATIENT
Start: 2020-05-06 | End: 2020-09-11

## 2020-08-12 ENCOUNTER — OFFICE VISIT (OUTPATIENT)
Dept: FAMILY MEDICINE CLINIC | Age: 55
End: 2020-08-12
Payer: COMMERCIAL

## 2020-08-12 VITALS
BODY MASS INDEX: 48.15 KG/M2 | SYSTOLIC BLOOD PRESSURE: 140 MMHG | HEART RATE: 66 BPM | HEIGHT: 65 IN | WEIGHT: 289 LBS | DIASTOLIC BLOOD PRESSURE: 72 MMHG | RESPIRATION RATE: 16 BRPM | TEMPERATURE: 98.2 F | OXYGEN SATURATION: 98 %

## 2020-08-12 DIAGNOSIS — E55.9 VITAMIN D DEFICIENCY: ICD-10-CM

## 2020-08-12 DIAGNOSIS — C19 COLORECTAL CANCER (HCC): ICD-10-CM

## 2020-08-12 DIAGNOSIS — M15.9 PRIMARY OSTEOARTHRITIS INVOLVING MULTIPLE JOINTS: ICD-10-CM

## 2020-08-12 DIAGNOSIS — F43.23 ADJUSTMENT DISORDER WITH MIXED ANXIETY AND DEPRESSED MOOD: ICD-10-CM

## 2020-08-12 DIAGNOSIS — E78.2 ELEVATED TRIGLYCERIDES WITH HIGH CHOLESTEROL: ICD-10-CM

## 2020-08-12 DIAGNOSIS — Z79.1 LONG TERM (CURRENT) USE OF NON-STEROIDAL ANTI-INFLAMMATORIES (NSAID): ICD-10-CM

## 2020-08-12 DIAGNOSIS — E78.5 DYSLIPIDEMIA, GOAL LDL BELOW 130: ICD-10-CM

## 2020-08-12 DIAGNOSIS — I10 HTN (HYPERTENSION), BENIGN: Primary | ICD-10-CM

## 2020-08-12 DIAGNOSIS — E66.01 MORBID OBESITY WITH BODY MASS INDEX (BMI) OF 45.0 TO 49.9 IN ADULT (HCC): ICD-10-CM

## 2020-08-12 PROCEDURE — 99214 OFFICE O/P EST MOD 30 MIN: CPT | Performed by: FAMILY MEDICINE

## 2020-08-12 NOTE — PROGRESS NOTES
Chief Complaint   Patient presents with    Hypertension   1. Have you been to the ER, urgent care clinic since your last visit? Hospitalized since your last visit? No    2. Have you seen or consulted any other health care providers outside of the [address] [place] [address] since your last visit? Include any pap smears or colon screening.  No

## 2020-08-12 NOTE — PROGRESS NOTES
HISTORY OF PRESENT ILLNESS  HPI  Brock Toussaint a 54 y. o. Female with a history of HTN, colorectal cancer, osteoarthritis, adjustment disorder with anxiety and depression, vitamin D deficiency and arthralgia of left hip, who presents today for a f/u for her HTN. Pt reports that she is getting muscle cramps at night. She suspects this is related to work as she had to do more activity than usual. She points to the toes as the area of the cramps adding that it pulls her ankle down. Pt denies FHx of heart disease. Pt denies unusual SOB, chest pain, and any recent ER visits or hospitalizations. Past Medical History:   Diagnosis Date    Adjustment disorder with mixed anxiety and depressed mood     Colorectal Cancer- radiation& chemo after partial colectomy     2008    DJD (degenerative joint disease) 11/29/2010    Elevated triglycerides with high cholesterol 9/29/2014    Encounter for immunization 10/1/2015    HTN (hypertension), benign 3/14/2016    Primary osteoarthritis involving multiple joints 10/1/2015    Vitamin D deficiency 9/29/2014     Past Surgical History:   Procedure Laterality Date    HX BREAST BIOPSY Bilateral     benign bx's bilateral    HX COLECTOMY  partial for sigmoid cancer- 9/2008    TX BIOPSY OF BREAST, INCISIONAL  [id number]. Current Outpatient Medications on File Prior to Visit   Medication Sig Dispense Refill    lisinopriL (PRINIVIL, ZESTRIL) 10 mg tablet TAKE ONE TABLET BY MOUTH ONCE DAILY. 30 Tab 3    omeprazole (PRILOSEC) 20 mg capsule TAKE ONE CAPSULE BY MOUTH EVERY DAY. 30 Cap 3    meloxicam (MOBIC) 15 mg tablet TAKE ONE TABLET BY MOUTH ONCE DAILY. 90 Tab 1    FLUoxetine (PROzac) 20 mg capsule TAKE ONE CAPSULE BY MOUTH ONCE DAILY. 90 Cap 1    potassium 99 mg tablet Take 99 mg by mouth daily.  MAGNESIUM CARBONATE 250 Units by Does Not Apply route daily.  calcium 500 mg tab Take  by mouth daily.       Cholecalciferol, Vitamin D3, (VITAMIN D3) 1,000 unit cap Take 4,000 Units by mouth daily (after breakfast). No current facility-administered medications on file prior to visit. Allergies   Allergen Reactions    Sulfa (Sulfonamide Antibiotics) Hives     Family History   Problem Relation Age of Onset    Alzheimer Mother     Cancer Father         colo rectal cancer    Cancer Sister         breast left     Breast Cancer Sister 39     Social History     Socioeconomic History    Marital status:      Spouse name: Not on file    Number of children: Not on file    Years of education: Not on file    Highest education level: Not on file   Tobacco Use    Smoking status: Never Smoker    Smokeless tobacco: Never Used   Substance and Sexual Activity    Alcohol use: No    Drug use: No    Sexual activity: Never     Birth control/protection: None               Review of Systems   Constitutional: Negative for chills, diaphoresis, fever, malaise/fatigue and weight loss. Eyes: Negative for blurred vision, double vision, pain and redness. Respiratory: Negative for cough, shortness of breath and wheezing. Cardiovascular: Negative for chest pain, palpitations, orthopnea, claudication, leg swelling and PND. Skin: Negative for itching and rash. Neurological: Negative for dizziness, tingling, tremors, sensory change, speech change, focal weakness, seizures, loss of consciousness, weakness and headaches.      Results for orders placed or performed in visit on 08/12/20   LIPID PANEL   Result Value Ref Range    Cholesterol, total 195 100 - 199 mg/dL    Triglyceride 225 (H) 0 - 149 mg/dL    HDL Cholesterol 42 >39 mg/dL    VLDL, calculated 45 (H) 5 - 40 mg/dL    LDL, calculated 108 (H) 0 - 99 mg/dL   METABOLIC PANEL, COMPREHENSIVE   Result Value Ref Range    Glucose 110 (H) 65 - 99 mg/dL    BUN 14 6 - 24 mg/dL    Creatinine 0.70 0.57 - 1.00 mg/dL    GFR est non-AA 98 >59 mL/min/1.73    GFR est  >59 mL/min/1.73    BUN/Creatinine ratio 20 9 - 23    Sodium 140 134 - 144 mmol/L    Potassium 4.8 3.5 - 5.2 mmol/L    Chloride 103 96 - 106 mmol/L    CO2 26 20 - 29 mmol/L    Calcium 9.6 8.7 - 10.2 mg/dL    Protein, total 6.4 6.0 - 8.5 g/dL    Albumin 4.4 3.8 - 4.9 g/dL    GLOBULIN, TOTAL 2.0 1.5 - 4.5 g/dL    A-G Ratio 2.2 1.2 - 2.2    Bilirubin, total 0.4 0.0 - 1.2 mg/dL    Alk. phosphatase 61 39 - 117 IU/L    AST (SGOT) 21 0 - 40 IU/L    ALT (SGPT) 24 0 - 32 IU/L   VITAMIN D, 25 HYDROXY   Result Value Ref Range    VITAMIN D, 25-HYDROXY 28.2 (L) 30.0 - 100.0 ng/mL   CVD REPORT   Result Value Ref Range    INTERPRETATION Note              Physical Exam  Visit Vitals  /72   Pulse 66   Temp 98.2 °F (36.8 °C)   Resp 16   Ht 5' 5\" (1.651 m)   Wt 289 lb (131.1 kg)   SpO2 98%   BMI 48.09 kg/m²         Head: Normocephalic, without obvious abnormality, atraumatic  Eyes: conjunctivae/corneas clear. PERRL, EOM's intact. Neck: supple, symmetrical, trachea midline, no adenopathy, thyroid: not enlarged, symmetric, no tenderness/mass/nodules, no carotid bruit and no JVD  Lungs: clear to auscultation bilaterally  Heart: regular rate and rhythm, S1, S2 normal, no murmur, click, rub or gallop  Extremities: extremities normal, atraumatic, no cyanosis or edema  Pulses: 2+ and symmetric  Lymph nodes: Cervical, supraclavicular, and axillary nodes normal.  Neurologic: Grossly normal          ASSESSMENT and PLAN    ICD-10-CM ICD-9-CM    1. HTN (hypertension), benign  I97 665.8 METABOLIC PANEL, COMPREHENSIVE   2. Elevated triglycerides with high cholesterol  E78.2 272.2 LIPID PANEL      METABOLIC PANEL, COMPREHENSIVE   3. Vitamin D deficiency  E55.9 268.9 VITAMIN D, 25 HYDROXY   4. Primary osteoarthritis involving multiple joints  M89.49 715.98    5. Morbid obesity with body mass index (BMI) of 45.0 to 49.9 in adult (HCC)  E66.01 278.01     Z68.42 V85.42    6. Colorectal Cancer- radiation& chemo after partial colectomy-2008  C19 154.0    7.  Adjustment disorder with mixed anxiety and depressed mood  F43.23 309.28    8. Dyslipidemia, goal LDL below 130  E78.5 272.4    9. Long term (current) use of non-steroidal anti-inflammatories (nsaid)  Z79.1 V58.64      Diagnoses and all orders for this visit:    1. HTN (hypertension), benign  -     METABOLIC PANEL, COMPREHENSIVE    2. Elevated triglycerides with high cholesterol  -     LIPID PANEL  -     METABOLIC PANEL, COMPREHENSIVE    3. Vitamin D deficiency  -     VITAMIN D, 25 HYDROXY    4. Primary osteoarthritis involving multiple joints    5. Morbid obesity with body mass index (BMI) of 45.0 to 49.9 in adult ([facility] [address]    6. Colorectal Cancer- radiation& chemo after partial colectomy-2008    7. Adjustment disorder with mixed anxiety and depressed mood    8. Dyslipidemia, goal LDL below 130    9. Long term (current) use of non-steroidal anti-inflammatories (nsaid)    Other orders  -     VITAMIN D, 25 HYDROXY  -     CVD REPORT      Follow-up and Dispositions    · Return in about 6 months (around 2/12/2021) for F/U hypertension, GERD, F/U of obesity, f/u Vitamin D deficiency, F/U anxiety. lab results and schedule of future lab studies reviewed with patient  reviewed diet, exercise and weight control  cardiovascular risk and specific lipid/LDL goals reviewed  reviewed medications and side effects in detail  Please call my office if there are any questions- [phone]. I will arrange for follow up after the lab tests done from today return  Recommended a weekly \"heart check. \" I went into detail how to do this. Call for refills on medications as needed. Discussed expected course/resolution/complications of diagnosis in detail with patient. Medication risks/benefits/costs/interactions/alternatives discussed with patient. Pt was given an after visit summary which includes diagnoses, current medications & vitals. Pt expressed understanding with the diagnosis and plan        BMI is significantly elevated- in the morbidly obese range.  I reviewed diet, exercise and weight control. Discussed weight control in detail, the importance of mainly decreased carbs, and for weight maintenance, exercise; discussed different diets and that it isn't as important to watch the type of foods as it is to decrease calorie intake no matter what type of diet you do, etc. Patient is aware that there are medications and surgery that are options to assist in weight loss. I also mentioned Melisa/Ambrosio as an effective way to lose weight. Should follow up monthly with an office visit if has interest in working on weight loss. Total 25 minutes,60 % counseling re: Review of  the proper technique of checking the blood pressure- check it on an average day only, not on a stressful day, sitting, no exercise for at least 1 hour and not experiencing any new pain( chronic pain is OK). Patient encouraged to check BP sitting and standing at least once a month and to report these readings to me if > 140/ 90 on average , or if the standing BP is >  15 points lower than the sitting. Always check it twice and if there is > 5 points decrease from the previous reading( top reading or systolic) keep checking it until it does not drop 5 points. Write only this final reading down, not the preceding readings. If out of these readings there is only 1 out of 4 or less > 474, or > 90 diastolic then your blood pressure is OK; it needs further treatment if it is above this. Also, don't forget,  as noted above, to check your blood pressure standing once a month; this is to detect a drop in your BP that might lead to fainting and serious injury; you check it standing with your arm hanging straight down and relaxed. Check it twice waiting 1 minute between the two readings. If, with either one of these 2 readings there is a > 15 point drop of the systolic compared to your sitting pressure( done before the standing BP), then let me know.  Following these guidelines, continue to check your BP and write down only the ones described above and it will help me to effectively treat your blood pressure. Reviewed symptoms, or lack thereof, of hypertension and elevated cholesterol. Regular exercise is very important to your health; it helps mentally, physically, socially; it prevents injuries if done properly. Exercise, even as simple as walking 20-30 minutes daily has major benefits to your health even though your \"numbers\" are the same in the lab. See if you can add this into your daily regimen and after a few months it will become a regular habit-\"just something you do,\" like brushing your teeth. A combination of aerobic exercise and strengthening and stretching is felt to be the best for you, so this should be your ultimate goal.   This can be done in the privacy of your home or in a group setting as at the gym  Some prefer having a , others prefer to do exercise in groups or individually. Do what \"works\" for you. You need to make it simple and \"fun,\" or you most likely will not continue it. Recommended a weekly \"heart check. \" I went into detail how to do this. Also, discussed symptoms of concern that were noted today in the note above, treatment options( including doing nothing), when to follow up before recommended time frame. Also, answered all questions. I talked about weight reduction and the importance of doing that. I reminded pt to do a weekly heart check. This document was written by Adali Harvey, as dictated by Jaspreet Llamas MD.  I have reviewed and agree with the above note and have made corrections where appropriate Adalberto Hardin M.D.

## 2020-08-13 LAB
25(OH)D3+25(OH)D2 SERPL-MCNC: 28.2 NG/ML (ref 30–100)
ALBUMIN SERPL-MCNC: 4.4 G/DL (ref 3.8–4.9)
ALBUMIN/GLOB SERPL: 2.2 {RATIO} (ref 1.2–2.2)
ALP SERPL-CCNC: 61 IU/L (ref 39–117)
ALT SERPL-CCNC: 24 IU/L (ref 0–32)
AST SERPL-CCNC: 21 IU/L (ref 0–40)
BILIRUB SERPL-MCNC: 0.4 MG/DL (ref 0–1.2)
BUN SERPL-MCNC: 14 MG/DL (ref 6–24)
BUN/CREAT SERPL: 20 (ref 9–23)
CALCIUM SERPL-MCNC: 9.6 MG/DL (ref 8.7–10.2)
CHLORIDE SERPL-SCNC: 103 MMOL/L (ref 96–106)
CHOLEST SERPL-MCNC: 195 MG/DL (ref 100–199)
CO2 SERPL-SCNC: 26 MMOL/L (ref 20–29)
CREAT SERPL-MCNC: 0.7 MG/DL (ref 0.57–1)
GLOBULIN SER CALC-MCNC: 2 G/DL (ref 1.5–4.5)
GLUCOSE SERPL-MCNC: 110 MG/DL (ref 65–99)
HDLC SERPL-MCNC: 42 MG/DL
INTERPRETATION, 910389: NORMAL
LDLC SERPL CALC-MCNC: 108 MG/DL (ref 0–99)
POTASSIUM SERPL-SCNC: 4.8 MMOL/L (ref 3.5–5.2)
PROT SERPL-MCNC: 6.4 G/DL (ref 6–8.5)
SODIUM SERPL-SCNC: 140 MMOL/L (ref 134–144)
TRIGL SERPL-MCNC: 225 MG/DL (ref 0–149)
VLDLC SERPL CALC-MCNC: 45 MG/DL (ref 5–40)

## 2020-08-13 NOTE — PROGRESS NOTES
GREAT NEWS!! All of your recent lab tests are normal or at goal.  No diabetes, normal liver and kidney tests. Good cholesterol numbers( borderline LDL and elevated triglycerides will come down with weight loss).   I would continue everything the same and we will see you at your next scheduled follow up in February

## 2020-09-11 DIAGNOSIS — I10 HTN (HYPERTENSION), BENIGN: ICD-10-CM

## 2020-09-11 DIAGNOSIS — Z79.1 LONG TERM (CURRENT) USE OF NON-STEROIDAL ANTI-INFLAMMATORIES (NSAID): ICD-10-CM

## 2020-09-11 NOTE — TELEPHONE ENCOUNTER
PCP: Tony Ha MD    Last appt: 8/12/2020  Future Appointments   Date Time Provider Jere Theodore   2/23/2021  8:30 AM Tony Ha MD [facility]       Requested Prescriptions     Pending Prescriptions Disp Refills    lisinopriL (PRINIVIL, ZESTRIL) 10 mg tablet [Pharmacy Med Name: LISINOPRIL 10 MG TAB>] 30 Tab 0     Sig: TAKE ONE TABLET BY MOUTH ONCE DAILY.     omeprazole (PRILOSEC) 20 mg capsule [Pharmacy Med Name: OMEPRAZOLE DR 20 MG CAP] 30 Cap 0     Sig: TAKE ONE CAPSULE BY MOUTH ONCE DAILY

## 2020-09-12 RX ORDER — OMEPRAZOLE 20 MG/1
CAPSULE, DELAYED RELEASE ORAL
Qty: 30 CAP | Refills: 5 | Status: SHIPPED | OUTPATIENT
Start: 2020-09-12 | End: 2021-03-31

## 2020-09-12 RX ORDER — LISINOPRIL 10 MG/1
TABLET ORAL
Qty: 30 TAB | Refills: 5 | Status: SHIPPED | OUTPATIENT
Start: 2020-09-12 | End: 2021-03-31

## 2020-10-28 ENCOUNTER — TRANSCRIBE ORDER (OUTPATIENT)
Dept: SCHEDULING | Age: 55
End: 2020-10-28

## 2020-10-28 DIAGNOSIS — Z12.31 VISIT FOR SCREENING MAMMOGRAM: Primary | ICD-10-CM

## 2020-11-11 ENCOUNTER — HOSPITAL ENCOUNTER (OUTPATIENT)
Dept: MAMMOGRAPHY | Age: 55
Discharge: HOME | End: 2020-11-11
Attending: OBSTETRICS & GYNECOLOGY
Payer: COMMERCIAL

## 2020-11-11 DIAGNOSIS — Z12.31 VISIT FOR SCREENING MAMMOGRAM: ICD-10-CM

## 2020-11-11 PROCEDURE — 77067 SCR MAMMO BI INCL CAD: CPT

## 2021-03-31 DIAGNOSIS — F43.23 ADJUSTMENT DISORDER WITH MIXED ANXIETY AND DEPRESSED MOOD: ICD-10-CM

## 2021-03-31 DIAGNOSIS — I10 HTN (HYPERTENSION), BENIGN: ICD-10-CM

## 2021-03-31 DIAGNOSIS — Z79.1 LONG TERM (CURRENT) USE OF NON-STEROIDAL ANTI-INFLAMMATORIES (NSAID): ICD-10-CM

## 2021-03-31 DIAGNOSIS — M15.9 PRIMARY OSTEOARTHRITIS INVOLVING MULTIPLE JOINTS: ICD-10-CM

## 2021-04-02 RX ORDER — FLUOXETINE HYDROCHLORIDE 20 MG/1
CAPSULE ORAL
Qty: 30 CAP | Refills: 2 | Status: SHIPPED | OUTPATIENT
Start: 2021-04-02 | End: 2021-06-16 | Stop reason: SDUPTHER

## 2021-04-02 RX ORDER — MELOXICAM 15 MG/1
TABLET ORAL
Qty: 30 TAB | Refills: 2 | Status: SHIPPED | OUTPATIENT
Start: 2021-04-02 | End: 2021-06-29

## 2021-04-02 RX ORDER — OMEPRAZOLE 20 MG/1
CAPSULE, DELAYED RELEASE ORAL
Qty: 30 CAP | Refills: 2 | Status: SHIPPED | OUTPATIENT
Start: 2021-04-02 | End: 2021-06-16 | Stop reason: SDUPTHER

## 2021-04-02 RX ORDER — LISINOPRIL 10 MG/1
TABLET ORAL
Qty: 30 TAB | Refills: 2 | Status: SHIPPED | OUTPATIENT
Start: 2021-04-02 | End: 2021-06-16 | Stop reason: SDUPTHER

## 2021-06-16 ENCOUNTER — OFFICE VISIT (OUTPATIENT)
Dept: FAMILY MEDICINE CLINIC | Age: 56
End: 2021-06-16
Payer: COMMERCIAL

## 2021-06-16 VITALS
RESPIRATION RATE: 16 BRPM | WEIGHT: 293 LBS | BODY MASS INDEX: 48.82 KG/M2 | HEIGHT: 65 IN | OXYGEN SATURATION: 97 % | HEART RATE: 78 BPM | TEMPERATURE: 98.3 F | DIASTOLIC BLOOD PRESSURE: 72 MMHG | SYSTOLIC BLOOD PRESSURE: 130 MMHG

## 2021-06-16 DIAGNOSIS — E78.5 DYSLIPIDEMIA, GOAL LDL BELOW 130: ICD-10-CM

## 2021-06-16 DIAGNOSIS — E66.01 MORBID OBESITY WITH BODY MASS INDEX (BMI) OF 45.0 TO 49.9 IN ADULT (HCC): ICD-10-CM

## 2021-06-16 DIAGNOSIS — C19 COLORECTAL CANCER (HCC): ICD-10-CM

## 2021-06-16 DIAGNOSIS — E78.2 ELEVATED TRIGLYCERIDES WITH HIGH CHOLESTEROL: ICD-10-CM

## 2021-06-16 DIAGNOSIS — F43.23 ADJUSTMENT DISORDER WITH MIXED ANXIETY AND DEPRESSED MOOD: ICD-10-CM

## 2021-06-16 DIAGNOSIS — I10 HTN (HYPERTENSION), BENIGN: Primary | ICD-10-CM

## 2021-06-16 DIAGNOSIS — M62.838 MUSCLE SPASM OF LEFT LOWER EXTREMITY: ICD-10-CM

## 2021-06-16 DIAGNOSIS — M15.9 PRIMARY OSTEOARTHRITIS INVOLVING MULTIPLE JOINTS: ICD-10-CM

## 2021-06-16 DIAGNOSIS — Z79.1 LONG TERM (CURRENT) USE OF NON-STEROIDAL ANTI-INFLAMMATORIES (NSAID): ICD-10-CM

## 2021-06-16 DIAGNOSIS — E55.9 VITAMIN D DEFICIENCY: ICD-10-CM

## 2021-06-16 LAB
ALBUMIN SERPL-MCNC: 4.1 G/DL (ref 3.5–5)
ALBUMIN/GLOB SERPL: 1.4 {RATIO} (ref 1.1–2.2)
ALP SERPL-CCNC: 69 U/L (ref 45–117)
ALT SERPL-CCNC: 38 U/L (ref 12–78)
ANION GAP SERPL CALC-SCNC: 5 MMOL/L (ref 5–15)
AST SERPL-CCNC: 25 U/L (ref 15–37)
BILIRUB SERPL-MCNC: 0.4 MG/DL (ref 0.2–1)
BUN SERPL-MCNC: 15 MG/DL (ref 6–20)
BUN/CREAT SERPL: 23 (ref 12–20)
CALCIUM SERPL-MCNC: 9.8 MG/DL (ref 8.5–10.1)
CHLORIDE SERPL-SCNC: 106 MMOL/L (ref 97–108)
CHOLEST SERPL-MCNC: 243 MG/DL
CO2 SERPL-SCNC: 29 MMOL/L (ref 21–32)
CREAT SERPL-MCNC: 0.65 MG/DL (ref 0.55–1.02)
GLOBULIN SER CALC-MCNC: 2.9 G/DL (ref 2–4)
GLUCOSE SERPL-MCNC: 102 MG/DL (ref 65–100)
HDLC SERPL-MCNC: 46 MG/DL
HDLC SERPL: 5.3 {RATIO} (ref 0–5)
LDLC SERPL CALC-MCNC: 130.4 MG/DL (ref 0–100)
POTASSIUM SERPL-SCNC: 4.8 MMOL/L (ref 3.5–5.1)
PROT SERPL-MCNC: 7 G/DL (ref 6.4–8.2)
SODIUM SERPL-SCNC: 140 MMOL/L (ref 136–145)
TRIGL SERPL-MCNC: 333 MG/DL (ref ?–150)
VLDLC SERPL CALC-MCNC: 66.6 MG/DL

## 2021-06-16 PROCEDURE — 99214 OFFICE O/P EST MOD 30 MIN: CPT | Performed by: FAMILY MEDICINE

## 2021-06-16 RX ORDER — FLUOXETINE HYDROCHLORIDE 20 MG/1
CAPSULE ORAL
Qty: 30 CAPSULE | Refills: 5 | Status: SHIPPED | OUTPATIENT
Start: 2021-06-16 | End: 2022-01-07

## 2021-06-16 RX ORDER — OMEPRAZOLE 20 MG/1
CAPSULE, DELAYED RELEASE ORAL
Qty: 30 CAPSULE | Refills: 5 | Status: SHIPPED | OUTPATIENT
Start: 2021-06-16 | End: 2022-01-07

## 2021-06-16 RX ORDER — LISINOPRIL 10 MG/1
10 TABLET ORAL DAILY
Qty: 30 TABLET | Refills: 5 | Status: SHIPPED | OUTPATIENT
Start: 2021-06-16 | End: 2022-01-07

## 2021-06-16 NOTE — PROGRESS NOTES
HISTORY OF PRESENT ILLNESS  HPI  Stephanie chacko [id number]. o. Female with a history of HTN, colorectal cancer, osteoarthritis, adjustment disorder with anxiety and depression, vitamin D deficiency and arthralgia of left hip, who presents today c/o left foot pain and for f/u of these health problems. Pt sees her GI specialist regularly for f/u on her colon CA that was found in 2008. On her last colonoscopy, she had some bleeding prior to it, but colonoscopy was clean/ normal. She notes she has had no reoccurrence of the CA and she is to f/u in 10 years( I told her that 5 yrs is the norm if there was a colon CA at one point. . She has a FHx of colon CA. Pt walks every day without complication. Pt reports her left great toe cramps up only at night. She says this toe will pull up with associative pain on top of the foot. She says she has cramps in both of her legs but those are not painful and she is able to manage these. Pt does not check her BP outside of the office. Pt denies unusual SOB, chest pain, and any recent ER visits or hospitalizations. Past Medical History:   Diagnosis Date    Adjustment disorder with mixed anxiety and depressed mood     Colorectal Cancer- radiation& chemo after partial colectomy     2008    DJD (degenerative joint disease) 11/29/2010    Elevated triglycerides with high cholesterol 9/29/2014    Encounter for immunization 10/1/2015    HTN (hypertension), benign 3/14/2016    Primary osteoarthritis involving multiple joints 10/1/2015    Vitamin D deficiency 9/29/2014     Past Surgical History:   Procedure Laterality Date    HX BREAST BIOPSY Bilateral     benign bx's bilateral    HX TOTAL COLECTOMY  partial for sigmoid cancer- 9/2008    [place] BIOPSY OF BREAST, INCISIONAL  [id number]. Current Outpatient Medications on File Prior to Visit   Medication Sig Dispense Refill    meloxicam (MOBIC) 15 mg tablet TAKE ONE TABLET BY MOUTH ONCE DAILY.  30 Tab 2    potassium 99 mg tablet Take 99 mg by mouth daily.  MAGNESIUM CARBONATE 250 Units by Does Not Apply route daily.  calcium 500 mg tab Take  by mouth daily.  Cholecalciferol, Vitamin D3, (VITAMIN D3) 1,000 unit cap Take 4,000 Units by mouth daily (after breakfast). No current facility-administered medications on file prior to visit. Allergies   Allergen Reactions    Sulfa (Sulfonamide Antibiotics) Hives     Family History   Problem Relation Age of Onset    Alzheimer Mother     Cancer Father         colo rectal cancer    Cancer Sister         breast left     Breast Cancer Sister 39     Social History     Socioeconomic History    Marital status:      Spouse name: Not on file    Number of children: Not on file    Years of education: Not on file    Highest education level: Not on file   Tobacco Use    Smoking status: Never Smoker    Smokeless tobacco: Never Used   Vaping Use    Vaping Use: Never used   Substance and Sexual Activity    Alcohol use: No    Drug use: No    Sexual activity: Never     Birth control/protection: None     Social Determinants of Health     Financial Resource Strain:     Difficulty of Paying Living Expenses:    Food Insecurity:     Worried About Running Out of Food in the Last Year:     Ran Out of Food in the Last Year:    Transportation Needs:     Lack of Transportation (Medical):      Lack of Transportation (Non-Medical):    Physical Activity:     Days of Exercise per Week:     Minutes of Exercise per Session:    Stress:     Feeling of Stress :    Social Connections:     Frequency of Communication with Friends and Family:     Frequency of Social Gatherings with Friends and Family:     Attends Yazdanism Services:     Active Member of Clubs or Organizations:     Attends Club or Organization Meetings:     Marital Status:                Review of Systems   Constitutional: Negative for chills, diaphoresis, fever, malaise/fatigue and weight loss.   Eyes: Negative for blurred vision, double vision, pain and redness. Respiratory: Negative for cough, shortness of breath and wheezing. Cardiovascular: Negative for chest pain, palpitations, orthopnea, claudication, leg swelling and PND. Skin: Negative for itching and rash. Neurological: Negative for dizziness, tingling, tremors, sensory change, speech change, focal weakness, seizures, loss of consciousness, weakness and headaches. Results for orders placed or performed in visit on 59/76/11   METABOLIC PANEL, COMPREHENSIVE   Result Value Ref Range    Sodium 140 136 - 145 mmol/L    Potassium 4.8 3.5 - 5.1 mmol/L    Chloride 106 97 - 108 mmol/L    CO2 29 21 - 32 mmol/L    Anion gap 5 5 - 15 mmol/L    Glucose 102 (H) 65 - 100 mg/dL    BUN 15 6 - 20 MG/DL    Creatinine 0.65 0.55 - 1.02 MG/DL    BUN/Creatinine ratio 23 (H) 12 - 20      GFR est AA >60 >60 ml/min/1.73m2    GFR est non-AA >60 >60 ml/min/1.73m2    Calcium 9.8 8.5 - 10.1 MG/DL    Bilirubin, total 0.4 0.2 - 1.0 MG/DL    ALT (SGPT) 38 12 - 78 U/L    AST (SGOT) 25 15 - 37 U/L    Alk. phosphatase 69 45 - 117 U/L    Protein, total 7.0 6.4 - 8.2 g/dL    Albumin 4.1 3.5 - 5.0 g/dL    Globulin 2.9 2.0 - 4.0 g/dL    A-G Ratio 1.4 1.1 - 2.2     LIPID PANEL   Result Value Ref Range    Cholesterol, total 243 (H) <200 MG/DL    Triglyceride 333 (H) <150 MG/DL    HDL Cholesterol 46 MG/DL    LDL, calculated 130.4 (H) 0 - 100 MG/DL    VLDL, calculated 66.6 MG/DL    CHOL/HDL Ratio 5.3 (H) 0.0 - 5.0             Physical Exam  Visit Vitals  /72   Pulse 78   Temp 98.3 °F (36.8 °C)   Resp 16   Ht 5' 5\" (1.651 m)   Wt 299 lb (135.6 kg)   SpO2 97%   BMI 49.76 kg/m²         Head: Normocephalic, without obvious abnormality, atraumatic  Eyes: conjunctivae/corneas clear. PERRL, EOM's intact.    Neck: supple, symmetrical, trachea midline, no adenopathy, thyroid: not enlarged, symmetric, no tenderness/mass/nodules, no carotid bruit and no JVD  Lungs: clear to auscultation bilaterally  Heart: regular rate and rhythm, S1, S2 normal, no murmur, click, rub or gallop  Extremities: extremities normal, atraumatic, no cyanosis or edema  Pulses: 2+ and symmetric  Lymph nodes: Cervical, supraclavicular, and axillary nodes normal.  Neurologic: Grossly normal        ASSESSMENT and PLAN    ICD-10-CM ICD-9-CM    1. HTN (hypertension), benign  M56 459.4 METABOLIC PANEL, COMPREHENSIVE      lisinopriL (PRINIVIL, ZESTRIL) 10 mg tablet      METABOLIC PANEL, COMPREHENSIVE   2. Elevated triglycerides with high cholesterol  E78.2 272.2 LIPID PANEL      METABOLIC PANEL, COMPREHENSIVE      METABOLIC PANEL, COMPREHENSIVE      LIPID PANEL   3. Adjustment disorder with mixed anxiety and depressed mood  F43.23 309.28 FLUoxetine (PROzac) 20 mg capsule   4. Long term (current) use of non-steroidal anti-inflammatories (nsaid)  Z79.1 V58.64 omeprazole (PRILOSEC) 20 mg capsule   5. Vitamin D deficiency  E55.9 268.9    6. Morbid obesity with body mass index (BMI) of 45.0 to 49.9 in adult (HCC)  E66.01 278.01     Z68.42 V85.42    7. Primary osteoarthritis involving multiple joints  M89.49 715.98    8. Colorectal Cancer- radiation& chemo after partial colectomy-2008  C19 154.0    9. Dyslipidemia, goal LDL below 130  E78.5 272.4    10. Muscle spasm of left lower extremity  M62.838 728.85      Diagnoses and all orders for this visit:    1. HTN (hypertension), benign  -     METABOLIC PANEL, COMPREHENSIVE; Future  -     lisinopriL (PRINIVIL, ZESTRIL) 10 mg tablet; Take 1 Tablet by mouth daily. 2. Elevated triglycerides with high cholesterol  -     LIPID PANEL; Future  -     METABOLIC PANEL, COMPREHENSIVE; Future    3. Adjustment disorder with mixed anxiety and depressed mood  -     FLUoxetine (PROzac) 20 mg capsule; TAKE ONE CAPSULE BY MOUTH ONCE DAILY.     4. Long term (current) use of non-steroidal anti-inflammatories (nsaid)  -     omeprazole (PRILOSEC) 20 mg capsule; TAKE ONE CAPSULE BY MOUTH ONCE DAILY. 5. Vitamin D deficiency    6. Morbid obesity with body mass index (BMI) of 45.0 to 49.9 in adult ([facility] [address]    7. Primary osteoarthritis involving multiple joints    8. Colorectal Cancer- radiation& chemo after partial colectomy-2008    9. Dyslipidemia, goal LDL below 130    10. Muscle spasm of left lower extremity            lab results and schedule of future lab studies reviewed with patient  reviewed diet, exercise and weight control  cardiovascular risk and specific lipid/LDL goals reviewed  reviewed medications and side effects in detail  Please call my office if there are any questions- [phone]. I will arrange for follow up after the lab tests done from today return  Recommended a weekly \"heart check. \" I went into detail how to do this. Call for refills on medications as needed. Discussed expected course/resolution/complications of diagnosis in detail with patient. Medication risks/benefits/costs/interactions/alternatives discussed with patient. Pt was given an after visit summary which includes diagnoses, current medications & vitals. Pt expressed understanding with the diagnosis and plan      BMI is significantly elevated- in the morbidly obese range. I reviewed diet, exercise and weight control. Discussed weight control in detail, the importance of mainly decreased carbs, and for weight maintenance, exercise; discussed different diets and that it isn't as important to watch the type of foods as it is to decrease calorie intake no matter what type of diet you do, etc. Patient is aware that there are medications and surgery that are options to assist in weight loss. I also mentioned Melisa/Ambrosio as an effective way to lose weight. Should follow up monthly with an office visit if has interest in working on weight loss.     Total 50 minutes  re: Review of  the proper technique of checking the blood pressure- check it on an average day only, not on a stressful day, sitting, no exercise for at least 1 hour and not experiencing any new pain( chronic pain is OK). Patient encouraged to check BP sitting and standing at least once a month and to report these readings to me if > 140/ 90 on average , or if the standing BP is >  15 points lower than the sitting. Always check it twice and if there is > 5 points decrease from the previous reading( top reading or systolic) keep checking it until it does not drop 5 points. Write only this final reading down, not the preceding readings. If out of these readings there is only 1 out of 4 or less > 397, or > 90 diastolic then your blood pressure is OK; it needs further treatment if it is above this. Also, don't forget,  as noted above, to check your blood pressure standing once a month; this is to detect a drop in your BP that might lead to fainting and serious injury; you check it standing with your arm hanging straight down and relaxed. Check it twice waiting 1 minute between the two readings. If, with either one of these 2 readings there is a > 15 point drop of the systolic compared to your sitting pressure( done before the standing BP), then let me know. Following these guidelines, continue to check your BP and write down only the ones described above and it will help me to effectively treat your blood pressure. Reviewed symptoms, or lack thereof, of hypertension and elevated cholesterol. Regular exercise is very important to your health; it helps mentally, physically, socially; it prevents injuries if done properly. Exercise, even as simple as walking 20-30 minutes daily has major benefits to your health even though your \"numbers\" are the same in the lab. See if you can add this into your daily regimen and after a few months it will become a regular habit-\"just something you do,\" like brushing your teeth.      A combination of aerobic exercise and strengthening and stretching is felt to be the best for you, so this should be your ultimate goal. This can be done in the privacy of your home or in a group setting as at the gym  Some prefer having a , others prefer to do exercise in groups or individually. Do what \"works\" for you. You need to make it simple and \"fun,\" or you most likely will not continue it. Recommended a weekly \"heart check. \" I went into detail how to do this. Also, discussed symptoms of concern that were noted today in the note above, treatment options( including doing nothing), when to follow up before recommended time frame. Also, answered all questions. For her left great toe spasm, I recommended she do regular stretching of that muscle and showed her how to do that. We talked in detail about her weight and that she might want to consider mindful eating program.    This document was written by Maximilian Farmer, as dictated by Nish Dhaliwal MD.  I have reviewed and agree with the above note and have made corrections where appropriate Adalberto Case M.D.

## 2021-06-16 NOTE — PROGRESS NOTES
Chief Complaint   Patient presents with    Hypertension    Foot Pain     left foot    1. Have you been to the ER, urgent care clinic since your last visit? Hospitalized since your last visit? No    2. Have you seen or consulted any other health care providers outside of the [address] [place] [address] since your last visit? Include any pap smears or colon screening.  No

## 2021-06-20 PROBLEM — M62.838 MUSCLE SPASM OF LEFT LOWER EXTREMITY: Status: ACTIVE | Noted: 2021-06-20

## 2021-06-29 DIAGNOSIS — M15.9 PRIMARY OSTEOARTHRITIS INVOLVING MULTIPLE JOINTS: ICD-10-CM

## 2021-06-29 RX ORDER — MELOXICAM 15 MG/1
TABLET ORAL
Qty: 30 TABLET | Refills: 3 | Status: SHIPPED | OUTPATIENT
Start: 2021-06-29 | End: 2021-11-04

## 2021-10-27 ENCOUNTER — TRANSCRIBE ORDER (OUTPATIENT)
Dept: SCHEDULING | Age: 56
End: 2021-10-27

## 2021-10-27 DIAGNOSIS — Z12.31 SCREENING MAMMOGRAM FOR HIGH-RISK PATIENT: Primary | ICD-10-CM

## 2021-11-04 DIAGNOSIS — M15.9 PRIMARY OSTEOARTHRITIS INVOLVING MULTIPLE JOINTS: ICD-10-CM

## 2021-11-05 RX ORDER — MELOXICAM 15 MG/1
TABLET ORAL
Qty: 30 TABLET | Refills: 3 | Status: SHIPPED | OUTPATIENT
Start: 2021-11-05 | End: 2022-03-03

## 2021-11-24 ENCOUNTER — HOSPITAL ENCOUNTER (OUTPATIENT)
Dept: MAMMOGRAPHY | Age: 56
Discharge: HOME | End: 2021-11-24
Attending: OBSTETRICS & GYNECOLOGY
Payer: COMMERCIAL

## 2021-11-24 DIAGNOSIS — Z12.31 SCREENING MAMMOGRAM FOR HIGH-RISK PATIENT: ICD-10-CM

## 2021-11-24 PROCEDURE — 77067 SCR MAMMO BI INCL CAD: CPT

## 2022-01-06 DIAGNOSIS — I10 HTN (HYPERTENSION), BENIGN: ICD-10-CM

## 2022-01-06 DIAGNOSIS — Z79.1 LONG TERM (CURRENT) USE OF NON-STEROIDAL ANTI-INFLAMMATORIES (NSAID): ICD-10-CM

## 2022-01-06 DIAGNOSIS — F43.23 ADJUSTMENT DISORDER WITH MIXED ANXIETY AND DEPRESSED MOOD: ICD-10-CM

## 2022-01-07 RX ORDER — FLUOXETINE HYDROCHLORIDE 20 MG/1
CAPSULE ORAL
Qty: 30 CAPSULE | Refills: 0 | Status: SHIPPED | OUTPATIENT
Start: 2022-01-07 | End: 2022-01-20 | Stop reason: SDUPTHER

## 2022-01-07 RX ORDER — OMEPRAZOLE 20 MG/1
CAPSULE, DELAYED RELEASE ORAL
Qty: 30 CAPSULE | Refills: 0 | Status: SHIPPED | OUTPATIENT
Start: 2022-01-07 | End: 2022-01-20 | Stop reason: SDUPTHER

## 2022-01-07 RX ORDER — LISINOPRIL 10 MG/1
TABLET ORAL
Qty: 30 TABLET | Refills: 0 | Status: SHIPPED | OUTPATIENT
Start: 2022-01-07 | End: 2022-01-20 | Stop reason: SDUPTHER

## 2022-01-14 ENCOUNTER — OFFICE VISIT (OUTPATIENT)
Dept: URGENT CARE | Age: 57
End: 2022-01-14
Payer: COMMERCIAL

## 2022-01-14 VITALS — TEMPERATURE: 98.1 F | OXYGEN SATURATION: 96 % | RESPIRATION RATE: 16 BRPM | HEART RATE: 76 BPM

## 2022-01-14 DIAGNOSIS — U07.1 COVID-19: Primary | ICD-10-CM

## 2022-01-14 DIAGNOSIS — R09.89 RUNNY NOSE: ICD-10-CM

## 2022-01-14 PROCEDURE — 99212 OFFICE O/P EST SF 10 MIN: CPT | Performed by: FAMILY MEDICINE

## 2022-01-14 NOTE — PROGRESS NOTES
This patient was seen at [address] [place] [address] [facility]gent [facility] while in their vehicle due to COVID-19 pandemic with PPE and focused examination in order to decrease community viral transmission. The patient/guardian gave verbal consent to treat. Rasheed Rodriguez is a 61 y.o. female who presents for COVID-19 retest, pt tested positive on 1/7/22, still experiencing a runny nose and cough. Needs negative COVID test for work. Denies fever, SOB, N/V/D. The history is provided by the patient. Past Medical History:   Diagnosis Date    Adjustment disorder with mixed anxiety and depressed mood     Colorectal Cancer- radiation& chemo after partial colectomy     2008    DJD (degenerative joint disease) 11/29/2010    Elevated triglycerides with high cholesterol 9/29/2014    Encounter for immunization 10/1/2015    HTN (hypertension), benign 3/14/2016    Primary osteoarthritis involving multiple joints 10/1/2015    Vitamin D deficiency 9/29/2014        Past Surgical History:   Procedure Laterality Date    HX BREAST BIOPSY Bilateral     benign bx's bilateral    HX TOTAL COLECTOMY  partial for sigmoid cancer- 9/2008    [place] BIOPSY OF BREAST, INCISIONAL  [id number].          Family History   Problem Relation Age of Onset    Alzheimer's Disease Mother     Cancer Father         colo rectal cancer    Cancer Sister         breast left     Breast Cancer Sister 39        Social History     Socioeconomic History    Marital status:      Spouse name: Not on file    Number of children: Not on file    Years of education: Not on file    Highest education level: Not on file   Occupational History    Not on file   Tobacco Use    Smoking status: Never Smoker    Smokeless tobacco: Never Used   Vaping Use    Vaping Use: Never used   Substance and Sexual Activity    Alcohol use: No    Drug use: No    Sexual activity: Never     Birth control/protection: None   Other Topics Concern    Not on file   Social History Narrative    Not on file     Social Determinants of Health     Financial Resource Strain:     Difficulty of Paying Living Expenses: Not on file   Food Insecurity:     Worried About Running Out of Food in the Last Year: Not on file    Noah of Food in the Last Year: Not on file   Transportation Needs:     Lack of Transportation (Medical): Not on file    Lack of Transportation (Non-Medical): Not on file   Physical Activity:     Days of Exercise per Week: Not on file    Minutes of Exercise per Session: Not on file   Stress:     Feeling of Stress : Not on file   Social Connections:     Frequency of Communication with Friends and Family: Not on file    Frequency of Social Gatherings with Friends and Family: Not on file    Attends Jain Services: Not on file    Active Member of [address] [place] [address] [employer] or Organizations: Not on file    Attends Club or Organization Meetings: Not on file    Marital Status: Not on file   Intimate Partner Violence:     Fear of Current or Ex-Partner: Not on file    Emotionally Abused: Not on file    Physically Abused: Not on file    Sexually Abused: Not on file   Housing Stability:     Unable to Pay for Housing in the Last Year: Not on file    Number of [place] in the Last Year: Not on file    Unstable Housing in the Last Year: Not on file                ALLERGIES: Sulfa (sulfonamide antibiotics)    Review of Systems   Constitutional: Negative for activity change, appetite change and fever. HENT: Positive for rhinorrhea. Respiratory: Positive for cough. Negative for shortness of breath. Gastrointestinal: Negative for diarrhea, nausea and vomiting. Vitals:    01/14/22 0907   Pulse: 76   Resp: 16   Temp: 98.1 °F (36.7 °C)   SpO2: 96%       Physical Exam  Vitals and nursing note reviewed. Constitutional:       General: She is not in acute distress. Appearance: She is well-developed. She is not diaphoretic.    Pulmonary:      Effort: Pulmonary effort is normal. No respiratory distress. Breath sounds: Normal breath sounds. No stridor. No wheezing, rhonchi or rales. Neurological:      Mental Status: She is alert. Psychiatric:         Behavior: Behavior normal.         Thought Content: Thought content normal.         Judgment: Judgment normal.         [facility]    ICD-10-CM ICD-9-CM   1. COVID-19  U07.1 079.89   2. Runny nose  R09.89 784.99       Orders Placed This Encounter    NOVEL CORONAVIRUS (COVID-19)     Scheduling Instructions:      1) Due to current limited availability of the COVID-19 PCR test, tests will be prioritized and may not be completed.              2) Order only if the test result will change clinical management or necessary for a return to mission-critical employment decision.              3) Print and instruct patient to adhere to CDC home isolation program. (Link Above)              4) Set up or refer patient for a monitoring program.              5) Have patient sign up for and leverage [employer] (if not previously done). Order Specific Question:   Is this test for diagnosis or screening? Answer:   Diagnosis of ill patient     Order Specific Question:   Symptomatic for COVID-19 as defined by CDC? Answer:   Yes     Order Specific Question:   Date of Symptom Onset     Answer:   1/7/2022     Order Specific Question:   Hospitalized for COVID-19? Answer:   No     Order Specific Question:   Admitted to ICU for COVID-19? Answer:   No     Order Specific Question:   Employed in healthcare setting? Answer:   Unknown     Order Specific Question:   Resident in a congregate (group) care setting? Answer:   Unknown     Order Specific Question:   Pregnant? Answer:   No     Order Specific Question:   Previously tested for COVID-19? Answer:   Unknown      Await COVID results  Quarantine complete    If signs and symptoms become worse the pt is to go to the ER.            Procedures

## 2022-01-17 LAB — SARS-COV-2, NAA: DETECTED

## 2022-01-20 ENCOUNTER — OFFICE VISIT (OUTPATIENT)
Dept: FAMILY MEDICINE CLINIC | Age: 57
End: 2022-01-20
Payer: COMMERCIAL

## 2022-01-20 VITALS
WEIGHT: 289 LBS | HEIGHT: 65 IN | OXYGEN SATURATION: 98 % | TEMPERATURE: 98.6 F | SYSTOLIC BLOOD PRESSURE: 136 MMHG | HEART RATE: 78 BPM | RESPIRATION RATE: 16 BRPM | DIASTOLIC BLOOD PRESSURE: 72 MMHG | BODY MASS INDEX: 48.15 KG/M2

## 2022-01-20 DIAGNOSIS — Z79.1 LONG TERM (CURRENT) USE OF NON-STEROIDAL ANTI-INFLAMMATORIES (NSAID): ICD-10-CM

## 2022-01-20 DIAGNOSIS — F43.23 ADJUSTMENT DISORDER WITH MIXED ANXIETY AND DEPRESSED MOOD: ICD-10-CM

## 2022-01-20 DIAGNOSIS — M15.9 PRIMARY OSTEOARTHRITIS INVOLVING MULTIPLE JOINTS: ICD-10-CM

## 2022-01-20 DIAGNOSIS — E78.2 ELEVATED TRIGLYCERIDES WITH HIGH CHOLESTEROL: ICD-10-CM

## 2022-01-20 DIAGNOSIS — I10 HTN (HYPERTENSION), BENIGN: Primary | ICD-10-CM

## 2022-01-20 DIAGNOSIS — M62.838 MUSCLE SPASM OF LEFT LOWER EXTREMITY: ICD-10-CM

## 2022-01-20 DIAGNOSIS — E78.5 DYSLIPIDEMIA, GOAL LDL BELOW 130: ICD-10-CM

## 2022-01-20 DIAGNOSIS — E66.01 MORBID OBESITY WITH BMI OF 45.0-49.9, ADULT (HCC): ICD-10-CM

## 2022-01-20 DIAGNOSIS — C19 COLORECTAL CANCER (HCC): ICD-10-CM

## 2022-01-20 DIAGNOSIS — E55.9 VITAMIN D DEFICIENCY: ICD-10-CM

## 2022-01-20 LAB
25(OH)D3 SERPL-MCNC: 29.7 NG/ML (ref 30–100)
ALBUMIN SERPL-MCNC: 4 G/DL (ref 3.5–5)
ALBUMIN/GLOB SERPL: 1.3 {RATIO} (ref 1.1–2.2)
ALP SERPL-CCNC: 79 U/L (ref 45–117)
ALT SERPL-CCNC: 35 U/L (ref 12–78)
ANION GAP SERPL CALC-SCNC: 4 MMOL/L (ref 5–15)
AST SERPL-CCNC: 19 U/L (ref 15–37)
BILIRUB SERPL-MCNC: 0.3 MG/DL (ref 0.2–1)
BUN SERPL-MCNC: 22 MG/DL (ref 6–20)
BUN/CREAT SERPL: 29 (ref 12–20)
CALCIUM SERPL-MCNC: 9.7 MG/DL (ref 8.5–10.1)
CHLORIDE SERPL-SCNC: 107 MMOL/L (ref 97–108)
CHOLEST SERPL-MCNC: 199 MG/DL
CO2 SERPL-SCNC: 28 MMOL/L (ref 21–32)
CREAT SERPL-MCNC: 0.77 MG/DL (ref 0.55–1.02)
GLOBULIN SER CALC-MCNC: 3 G/DL (ref 2–4)
GLUCOSE SERPL-MCNC: 114 MG/DL (ref 65–100)
HDLC SERPL-MCNC: 38 MG/DL
HDLC SERPL: 5.2 {RATIO} (ref 0–5)
LDLC SERPL CALC-MCNC: 112.4 MG/DL (ref 0–100)
POTASSIUM SERPL-SCNC: 4.7 MMOL/L (ref 3.5–5.1)
PROT SERPL-MCNC: 7 G/DL (ref 6.4–8.2)
SODIUM SERPL-SCNC: 139 MMOL/L (ref 136–145)
TRIGL SERPL-MCNC: 243 MG/DL (ref ?–150)
VLDLC SERPL CALC-MCNC: 48.6 MG/DL

## 2022-01-20 PROCEDURE — 99214 OFFICE O/P EST MOD 30 MIN: CPT | Performed by: FAMILY MEDICINE

## 2022-01-20 RX ORDER — OMEPRAZOLE 20 MG/1
20 CAPSULE, DELAYED RELEASE ORAL DAILY
Qty: 30 CAPSULE | Refills: 0 | Status: SHIPPED | OUTPATIENT
Start: 2022-01-20 | End: 2022-03-03

## 2022-01-20 RX ORDER — FLUOXETINE HYDROCHLORIDE 20 MG/1
20 CAPSULE ORAL DAILY
Qty: 30 CAPSULE | Refills: 0 | Status: SHIPPED | OUTPATIENT
Start: 2022-01-20 | End: 2022-03-03

## 2022-01-20 RX ORDER — LISINOPRIL 10 MG/1
10 TABLET ORAL DAILY
Qty: 30 TABLET | Refills: 5 | Status: SHIPPED | OUTPATIENT
Start: 2022-01-20 | End: 2022-08-12

## 2022-01-20 NOTE — PROGRESS NOTES
HISTORY OF PRESENT ILLNESS  HPI  Delphine Hagen a 56 y. o. Female with a history of HTN, colorectal cancer, osteoarthritis, adjustment disorder with anxiety and depression, vitamin D deficiency and arthralgia of left hip, who presents today for f/u of these health problems.     She tested positive for COVID several months ago but just had some mild URI symptoms and got over it. She has had the COVID initial vaccine and booster as well. She was never that ill from the infection. She is here to follow up on her BP and her anxiety/depression which is well controlled on fluoxetine. She does not check her BP at home on a regular basis but denies any lightheadedness. Her weight is down from her max of 315 lb a few years ago to about 25 lbs down and presently at 289 lb.       Pt denies unusual SOB, chest pain, and any recent ER visits or hospitalizations. Past Medical History:   Diagnosis Date    Adjustment disorder with mixed anxiety and depressed mood     Colorectal Cancer- radiation& chemo after partial colectomy     2008    DJD (degenerative joint disease) 11/29/2010    Elevated triglycerides with high cholesterol 9/29/2014    Encounter for immunization 10/1/2015    HTN (hypertension), benign 3/14/2016    Primary osteoarthritis involving multiple joints 10/1/2015    Vitamin D deficiency 9/29/2014     Past Surgical History:   Procedure Laterality Date    HX BREAST BIOPSY Bilateral     benign bx's bilateral    HX TOTAL COLECTOMY  partial for sigmoid cancer- 9/2008    VA BIOPSY OF BREAST, INCISIONAL  [id number]. Current Outpatient Medications on File Prior to Visit   Medication Sig Dispense Refill    meloxicam (MOBIC) 15 mg tablet TAKE ONE TABLET BY MOUTH ONCE DAILY. 30 Tablet 3    potassium 99 mg tablet Take 99 mg by mouth daily.  MAGNESIUM CARBONATE 250 Units by Does Not Apply route daily.  calcium 500 mg tab Take  by mouth daily.       Cholecalciferol, Vitamin D3, (VITAMIN D3) 1,000 unit cap Take 4,000 Units by mouth daily (after breakfast). No current facility-administered medications on file prior to visit. Allergies   Allergen Reactions    Sulfa (Sulfonamide Antibiotics) Hives     Family History   Problem Relation Age of Onset    Alzheimer's Disease Mother     Cancer Father         colo rectal cancer    Cancer Sister         breast left     Breast Cancer Sister 39     Social History     Socioeconomic History    Marital status:    Tobacco Use    Smoking status: Never Smoker    Smokeless tobacco: Never Used   Vaping Use    Vaping Use: Never used   Substance and Sexual Activity    Alcohol use: No    Drug use: No    Sexual activity: Never     Birth control/protection: None               Review of Systems   Constitutional: Negative for chills, diaphoresis, fever, malaise/fatigue and weight loss. Eyes: Negative for blurred vision, double vision, pain and redness. Respiratory: Negative for cough, shortness of breath and wheezing. Cardiovascular: Negative for chest pain, palpitations, orthopnea, claudication, leg swelling and PND. Skin: Negative for itching and rash. Neurological: Negative for dizziness, tingling, tremors, sensory change, speech change, focal weakness, seizures, loss of consciousness, weakness and headaches.      Results for orders placed or performed in visit on 01/20/22   VITAMIN D, 25 HYDROXY   Result Value Ref Range    Vitamin D 25-Hydroxy 29.7 (L) 30 - 592 ng/mL   METABOLIC PANEL, COMPREHENSIVE   Result Value Ref Range    Sodium 139 136 - 145 mmol/L    Potassium 4.7 3.5 - 5.1 mmol/L    Chloride 107 97 - 108 mmol/L    CO2 28 21 - 32 mmol/L    Anion gap 4 (L) 5 - 15 mmol/L    Glucose 114 (H) 65 - 100 mg/dL    BUN 22 (H) 6 - 20 MG/DL    Creatinine 0.77 0.55 - 1.02 MG/DL    BUN/Creatinine ratio 29 (H) 12 - 20      GFR est AA >60 >60 ml/min/1.73m2    GFR est non-AA >60 >60 ml/min/1.73m2    Calcium 9.7 8.5 - 10.1 MG/DL    Bilirubin, total 0.3 0.2 - 1.0 MG/DL    ALT (SGPT) 35 12 - 78 U/L    AST (SGOT) 19 15 - 37 U/L    Alk. phosphatase 79 45 - 117 U/L    Protein, total 7.0 6.4 - 8.2 g/dL    Albumin 4.0 3.5 - 5.0 g/dL    Globulin 3.0 2.0 - 4.0 g/dL    A-G Ratio 1.3 1.1 - 2.2     LIPID PANEL   Result Value Ref Range    Cholesterol, total 199 <200 MG/DL    Triglyceride 243 (H) <150 MG/DL    HDL Cholesterol 38 MG/DL    LDL, calculated 112.4 (H) 0 - 100 MG/DL    VLDL, calculated 48.6 MG/DL    CHOL/HDL Ratio 5.2 (H) 0.0 - 5.0                 Physical Exam  Visit Vitals  /72 (BP 1 Location: Left arm, BP Patient Position: Sitting, BP Cuff Size: Large adult)   Pulse 78   Temp 98.6 °F (37 °C)   Resp 16   Ht 5' 5\" (1.651 m)   Wt 289 lb (131.1 kg)   SpO2 98%   BMI 48.09 kg/m²         Head: Normocephalic, without obvious abnormality, atraumatic  Eyes: conjunctivae/corneas clear. PERRL, EOM's intact. Neck: supple, symmetrical, trachea midline, no adenopathy, thyroid: not enlarged, symmetric, no tenderness/mass/nodules, no carotid bruit and no JVD  Lungs: clear to auscultation bilaterally  Heart: regular rate and rhythm, S1, S2 normal, no murmur, click, rub or gallop  Extremities: extremities normal, atraumatic, no cyanosis or edema  Pulses: 2+ and symmetric  Lymph nodes: Cervical, supraclavicular, and axillary nodes normal.  Neurologic: Grossly normal        ASSESSMENT and PLAN    ICD-10-CM ICD-9-CM    1. HTN (hypertension), benign  I10 401.1 lisinopriL (PRINIVIL, ZESTRIL) 10 mg tablet   2. Elevated triglycerides with high cholesterol  E78.2 272.2 LIPID PANEL      METABOLIC PANEL, COMPREHENSIVE      METABOLIC PANEL, COMPREHENSIVE      LIPID PANEL   3. Adjustment disorder with mixed anxiety and depressed mood  F43.23 309.28 FLUoxetine (PROzac) 20 mg capsule   4. Long term (current) use of non-steroidal anti-inflammatories (nsaid)  Z79.1 V58.64 omeprazole (PRILOSEC) 20 mg capsule   5.  Morbid obesity with BMI of 45.0-49.9, adult ([facility])  E66.01 278.01     Z68.42 V85. 42    6. Vitamin D deficiency  E55.9 268.9 VITAMIN D, 25 HYDROXY      VITAMIN D, 25 HYDROXY   7. Colorectal Cancer- radiation& chemo after partial colectomy-2008  C19 154.0    8. Dyslipidemia, goal LDL below 130  E78.5 272.4    9. Primary osteoarthritis involving multiple joints  M89.49 715.98    10. Muscle spasm of left lower extremity  M62.838 728.85      Diagnoses and all orders for this visit:    1. HTN (hypertension), benign  -     lisinopriL (PRINIVIL, ZESTRIL) 10 mg tablet; Take 1 Tablet by mouth daily. 2. Elevated triglycerides with high cholesterol  -     LIPID PANEL; Future  -     METABOLIC PANEL, COMPREHENSIVE; Future    3. Adjustment disorder with mixed anxiety and depressed mood  -     FLUoxetine (PROzac) 20 mg capsule; Take 1 Capsule by mouth daily. 4. Long term (current) use of non-steroidal anti-inflammatories (nsaid)  -     omeprazole (PRILOSEC) 20 mg capsule; Take 1 Capsule by mouth daily. 5. Morbid obesity with BMI of 45.0-49.9, adult ([facility] [address]    6. Vitamin D deficiency  -     VITAMIN D, 25 HYDROXY; Future    7. Colorectal Cancer- radiation& chemo after partial colectomy-2008    8. Dyslipidemia, goal LDL below 130    9. Primary osteoarthritis involving multiple joints    10. Muscle spasm of left lower extremity      Follow-up and Dispositions    · Return in about 6 months (around 7/20/2022) for F/U hypertension, GERD, anxiety/depression. lab results and schedule of future lab studies reviewed with patient  reviewed diet, exercise and weight control  cardiovascular risk and specific lipid/LDL goals reviewed  reviewed medications and side effects in detail  Please call my office if there are any questions- [phone]. I will arrange for follow up after the lab tests done from today return  Recommended a weekly \"heart check. \" I went into detail how to do this. Call for refills on medications as needed.   Discussed expected course/resolution/complications of diagnosis in detail with patient. Medication risks/benefits/costs/interactions/alternatives discussed with patient. Pt was given an after visit summary which includes diagnoses, current medications & vitals. Pt expressed understanding with the diagnosis and plan      BMI is significantly elevated- in the morbidly obese range. I reviewed diet, exercise and weight control. Discussed weight control in detail, the importance of mainly decreased carbs, and for weight maintenance, exercise; discussed different diets and that it isn't as important to watch the type of foods as it is to decrease calorie intake no matter what type of diet you do, etc. Patient is aware that there are medications and surgery that are options to assist in weight loss. I also mentioned Melisa/Ambrosio as an effective way to lose weight. Should follow up monthly with an office visit if has interest in working on weight loss. Total 30 minutes  re: Recommended a weekly \"heart check. \" I went into detail how to do this. Regular exercise is very important to your health; it helps mentally, physically, socially; it prevents injuries if done properly. Exercise, even as simple as walking 20-30 minutes daily has major benefits to your health even though your \"numbers\" are the same in the lab. See if you can add this into your daily regimen and after a few months it will become a regular habit-\"just something you do,\" like brushing your teeth. A combination of aerobic exercise and strengthening and stretching is felt to be the best for you, so this should be your ultimate goal.   This can be done in the privacy of your home or in a group setting as at the gym  Some prefer having a , others prefer to do exercise in groups or individually. Do what \"works\" for you. You need to make it simple and \"fun,\" or you most likely will not continue it. Reviewed symptoms, or lack thereof, of hypertension and elevated cholesterol.      Review of  the proper technique of checking the blood pressure- check it on an average day only, not on a stressful day, sitting, no exercise for at least 1 hour and not experiencing any new pain( chronic pain is OK). Patient encouraged to check BP sitting and standing at least once a month and to report these readings to me if > 140/ 90 on average , or if the standing BP is >  15 points lower than the sitting. Always check it twice and if there is > 5 points decrease from the previous reading( top reading or systolic) keep checking it until it does not drop 5 points. Write only this final reading down, not the preceding readings. If out of these readings there is only 1 out of 4 or less > 186, or > 90 diastolic then your blood pressure is OK; it needs further treatment if it is above this. Also, don't forget,  as noted above, to check your blood pressure standing once a month; this is to detect a drop in your BP that might lead to fainting and serious injury; you check it standing with your arm hanging straight down and relaxed. Check it twice waiting 1 minute between the two readings. If, with either one of these 2 readings there is a > 15 point drop of the systolic compared to your sitting pressure( done before the standing BP), then let me know. Following these guidelines, continue to check your BP and write down only the ones described above and it will help me to effectively treat your blood pressure. Also, discussed symptoms of concern that were noted today in the note above, treatment options( including doing nothing), when to follow up before recommended time frame. Also, answered all questions. I suggested she start checking orthostatic BP reading as at her current weight she may not need BP medicine or may just require a lower dose. Her BP did drop 10-15 points when she stood but no further than that.      Pt had some mild residual cough form her COVID infection and reassured her that it gets better, especially if the exam is normal as it is today.     This document was written by Corbin Niño, as dictated by Steve Jean MD.

## 2022-01-20 NOTE — PROGRESS NOTES
Chief Complaint   Patient presents with    Hypertension    Other     tested negative on monday after being positive   1. \"Have you been to the ER, urgent care clinic since your last visit? Hospitalized since your last visit? \" No    2. \"Have you seen or consulted any other health care providers outside of the [address] [place] [address] since your last visit? \" No     3. For patients over 45: Has the patient had a colonoscopy? Yes - no Care Gap present     If the patient is female:    4. For patients over 40: Has the patient had a mammogram? Yes - no Care Gap present    5. For patients over 21: Has the patient had a pap smear?  Yes - no Care Gap present

## 2022-03-03 DIAGNOSIS — F43.23 ADJUSTMENT DISORDER WITH MIXED ANXIETY AND DEPRESSED MOOD: ICD-10-CM

## 2022-03-03 DIAGNOSIS — M15.9 PRIMARY OSTEOARTHRITIS INVOLVING MULTIPLE JOINTS: ICD-10-CM

## 2022-03-03 DIAGNOSIS — Z79.1 LONG TERM (CURRENT) USE OF NON-STEROIDAL ANTI-INFLAMMATORIES (NSAID): ICD-10-CM

## 2022-03-04 RX ORDER — OMEPRAZOLE 20 MG/1
CAPSULE, DELAYED RELEASE ORAL
Qty: 30 CAPSULE | Refills: 5 | Status: SHIPPED | OUTPATIENT
Start: 2022-03-04 | End: 2022-08-15

## 2022-03-04 RX ORDER — FLUOXETINE HYDROCHLORIDE 20 MG/1
CAPSULE ORAL
Qty: 30 CAPSULE | Refills: 5 | Status: SHIPPED | OUTPATIENT
Start: 2022-03-04 | End: 2022-08-15

## 2022-03-04 RX ORDER — MELOXICAM 15 MG/1
TABLET ORAL
Qty: 30 TABLET | Refills: 5 | Status: SHIPPED | OUTPATIENT
Start: 2022-03-04 | End: 2022-08-15

## 2022-03-20 PROBLEM — E66.01 MORBID OBESITY WITH BMI OF 45.0-49.9, ADULT (HCC): Status: ACTIVE | Noted: 2017-06-16

## 2022-03-20 PROBLEM — M62.838 MUSCLE SPASM OF LEFT LOWER EXTREMITY: Status: ACTIVE | Noted: 2021-06-20

## 2022-08-11 DIAGNOSIS — I10 HTN (HYPERTENSION), BENIGN: ICD-10-CM

## 2022-08-12 RX ORDER — LISINOPRIL 10 MG/1
TABLET ORAL
Qty: 30 TABLET | Refills: 0 | Status: SHIPPED | OUTPATIENT
Start: 2022-08-12 | End: 2022-09-19 | Stop reason: SDUPTHER

## 2022-08-12 NOTE — TELEPHONE ENCOUNTER
Requested Prescriptions     Pending Prescriptions Disp Refills    lisinopriL (PRINIVIL, ZESTRIL) 10 mg tablet [Pharmacy Med Name: LISINOPRIL 10 MG TAB>] 30 Tablet 0     Sig: TAKE ONE TABLET BY MOUTH ONCE DAILY.

## 2022-08-15 DIAGNOSIS — F43.23 ADJUSTMENT DISORDER WITH MIXED ANXIETY AND DEPRESSED MOOD: ICD-10-CM

## 2022-08-15 DIAGNOSIS — M15.9 PRIMARY OSTEOARTHRITIS INVOLVING MULTIPLE JOINTS: ICD-10-CM

## 2022-08-15 DIAGNOSIS — Z79.1 LONG TERM (CURRENT) USE OF NON-STEROIDAL ANTI-INFLAMMATORIES (NSAID): ICD-10-CM

## 2022-08-15 RX ORDER — MELOXICAM 15 MG/1
TABLET ORAL
Qty: 30 TABLET | Refills: 3 | Status: SHIPPED | OUTPATIENT
Start: 2022-08-15 | End: 2022-09-19 | Stop reason: SDUPTHER

## 2022-08-15 RX ORDER — FLUOXETINE HYDROCHLORIDE 20 MG/1
CAPSULE ORAL
Qty: 30 CAPSULE | Refills: 3 | Status: SHIPPED | OUTPATIENT
Start: 2022-08-15 | End: 2022-09-19 | Stop reason: SDUPTHER

## 2022-08-15 RX ORDER — OMEPRAZOLE 20 MG/1
CAPSULE, DELAYED RELEASE ORAL
Qty: 30 CAPSULE | Refills: 3 | Status: SHIPPED | OUTPATIENT
Start: 2022-08-15 | End: 2022-09-19 | Stop reason: SDUPTHER

## 2022-09-19 DIAGNOSIS — F43.23 ADJUSTMENT DISORDER WITH MIXED ANXIETY AND DEPRESSED MOOD: ICD-10-CM

## 2022-09-19 DIAGNOSIS — M15.9 PRIMARY OSTEOARTHRITIS INVOLVING MULTIPLE JOINTS: ICD-10-CM

## 2022-09-19 DIAGNOSIS — I10 HTN (HYPERTENSION), BENIGN: ICD-10-CM

## 2022-09-19 DIAGNOSIS — Z79.1 LONG TERM (CURRENT) USE OF NON-STEROIDAL ANTI-INFLAMMATORIES (NSAID): ICD-10-CM

## 2022-09-19 RX ORDER — OMEPRAZOLE 20 MG/1
20 CAPSULE, DELAYED RELEASE ORAL DAILY
Qty: 30 CAPSULE | Refills: 1 | Status: SHIPPED | OUTPATIENT
Start: 2022-09-19

## 2022-09-19 RX ORDER — LISINOPRIL 10 MG/1
10 TABLET ORAL DAILY
Qty: 30 TABLET | Refills: 5 | Status: SHIPPED | OUTPATIENT
Start: 2022-09-19

## 2022-09-19 RX ORDER — FLUOXETINE HYDROCHLORIDE 20 MG/1
20 CAPSULE ORAL DAILY
Qty: 30 CAPSULE | Refills: 5 | Status: SHIPPED | OUTPATIENT
Start: 2022-09-19

## 2022-09-19 RX ORDER — MELOXICAM 15 MG/1
TABLET ORAL
Qty: 30 TABLET | Refills: 5 | Status: SHIPPED | OUTPATIENT
Start: 2022-09-19

## 2022-10-17 ENCOUNTER — OFFICE VISIT (OUTPATIENT)
Dept: FAMILY MEDICINE CLINIC | Age: 57
End: 2022-10-17
Payer: COMMERCIAL

## 2022-10-17 VITALS
WEIGHT: 293 LBS | DIASTOLIC BLOOD PRESSURE: 70 MMHG | HEART RATE: 71 BPM | SYSTOLIC BLOOD PRESSURE: 132 MMHG | RESPIRATION RATE: 16 BRPM | OXYGEN SATURATION: 99 % | BODY MASS INDEX: 48.82 KG/M2 | HEIGHT: 65 IN | TEMPERATURE: 98.4 F

## 2022-10-17 DIAGNOSIS — F43.23 ADJUSTMENT DISORDER WITH MIXED ANXIETY AND DEPRESSED MOOD: ICD-10-CM

## 2022-10-17 DIAGNOSIS — E66.01 MORBID OBESITY WITH BODY MASS INDEX (BMI) OF 45.0 TO 49.9 IN ADULT (HCC): ICD-10-CM

## 2022-10-17 DIAGNOSIS — E78.2 ELEVATED TRIGLYCERIDES WITH HIGH CHOLESTEROL: Primary | ICD-10-CM

## 2022-10-17 DIAGNOSIS — E78.5 DYSLIPIDEMIA, GOAL LDL BELOW 130: ICD-10-CM

## 2022-10-17 DIAGNOSIS — M15.9 PRIMARY OSTEOARTHRITIS INVOLVING MULTIPLE JOINTS: ICD-10-CM

## 2022-10-17 DIAGNOSIS — R00.2 INTERMITTENT PALPITATIONS: ICD-10-CM

## 2022-10-17 DIAGNOSIS — E55.9 VITAMIN D DEFICIENCY: ICD-10-CM

## 2022-10-17 DIAGNOSIS — Z23 NEEDS FLU SHOT: ICD-10-CM

## 2022-10-17 DIAGNOSIS — C19 COLORECTAL CANCER (HCC): ICD-10-CM

## 2022-10-17 DIAGNOSIS — I10 HTN (HYPERTENSION), BENIGN: ICD-10-CM

## 2022-10-17 PROCEDURE — 90471 IMMUNIZATION ADMIN: CPT | Performed by: FAMILY MEDICINE

## 2022-10-17 PROCEDURE — 99215 OFFICE O/P EST HI 40 MIN: CPT | Performed by: FAMILY MEDICINE

## 2022-10-17 PROCEDURE — 90686 IIV4 VACC NO PRSV 0.5 ML IM: CPT | Performed by: FAMILY MEDICINE

## 2022-10-17 NOTE — PROGRESS NOTES
Chief Complaint   Patient presents with    Hypertension    Wrist Pain     Bump on left wrist    1. \"Have you been to the ER, urgent care clinic since your last visit? Hospitalized since your last visit? \" No    2. \"Have you seen or consulted any other health care providers outside of the [address] [place] [address] since your last visit? \" No     3. For patients over 45: Has the patient had a colonoscopy? Yes - no Care Gap present     If the patient is female:    4. For patients over 40: Has the patient had a mammogram? Yes - no Care Gap present    5. For patients over 21: Has the patient had a pap smear?  Yes - no Care Gap present

## 2022-10-17 NOTE — PROGRESS NOTES
HISTORY OF PRESENT ILLNESS  HPI  [place] is a 61 y.o. female with a history of HTN, colorectal cancer, osteoarthritis, adjustment disorder with anxiety and depression, vitamin D deficiency, muscle spasm of left lower extremity and arthralgia of left hip, who presents today c/o bump on left wrist and for f/u of these health problems. Pt reports in July, she noticed a lump over the dorsum of her left wrist. The lump was small, sore, and squishy. At this point, it has stopped hurting and is hard. Pt states the lump at one point grew some but has stopped growing since then. While at work 2 weels ago, pt felt flushed, and noticed her heart was pounding. She did not feel like she was going to die or she had to get out of the room. Pt states that her level of work is about the same but the procedures of seeing clients has returned to the way it was prior to the beginning of the pandemic. Pt asks if this could be related to anxiety. She has no FHx of pacemaker. Pt notes the mutiple men on one side of her family  from heart attacks, but no women with CAD. Pt denies unusual SOB, chest pain, and any recent ER visits or hospitalizations. Past Medical History:   Diagnosis Date    Adjustment disorder with mixed anxiety and depressed mood     Colorectal Cancer- radiation& chemo after partial colectomy         DJD (degenerative joint disease) 2010    Elevated triglycerides with high cholesterol 2014    Encounter for immunization 10/1/2015    HTN (hypertension), benign 3/14/2016    Primary osteoarthritis involving multiple joints 10/1/2015    Vitamin D deficiency 2014     Past Surgical History:   Procedure Laterality Date    HX BREAST BIOPSY Bilateral     benign bx's bilateral    HX TOTAL COLECTOMY  partial for sigmoid cancer- 2008    ME BIOPSY OF BREAST, INCISIONAL  [id number].      Current Outpatient Medications on File Prior to Visit   Medication Sig Dispense Refill lisinopriL (PRINIVIL, ZESTRIL) 10 mg tablet Take 1 Tablet by mouth daily. 30 Tablet 5    meloxicam (MOBIC) 15 mg tablet TAKE ONE TABLET BY MOUTH ONCE DAILY, preferably after a meal 30 Tablet 5    FLUoxetine (PROzac) 20 mg capsule Take 1 Capsule by mouth daily. 30 Capsule 5    omeprazole (PRILOSEC) 20 mg capsule Take 1 Capsule by mouth daily. 30 Capsule 1    potassium 99 mg tablet Take 99 mg by mouth daily. MAGNESIUM CARBONATE 250 Units by Does Not Apply route daily. calcium 500 mg tab Take  by mouth daily. cholecalciferol (VITAMIN D3) 25 mcg (1,000 unit) cap Take 4,000 Units by mouth daily (after breakfast). No current facility-administered medications on file prior to visit. Allergies   Allergen Reactions    Sulfa (Sulfonamide Antibiotics) Hives     Family History   Problem Relation Age of Onset    Alzheimer's Disease Mother     Cancer Father         colo rectal cancer    Cancer Sister         breast left     Breast Cancer Sister 39     Social History     Socioeconomic History    Marital status:    Tobacco Use    Smoking status: Never    Smokeless tobacco: Never   Vaping Use    Vaping Use: Never used   Substance and Sexual Activity    Alcohol use: No    Drug use: No    Sexual activity: Never     Birth control/protection: None               Review of Systems   Constitutional:  Negative for chills, diaphoresis, fever, malaise/fatigue and weight loss. Eyes:  Negative for blurred vision, double vision, pain and redness. Respiratory:  Negative for cough, shortness of breath and wheezing. Cardiovascular:  Positive for palpitations. Negative for chest pain, orthopnea, claudication, leg swelling and PND. Skin:  Positive for rash (lump). Negative for itching. Neurological:  Negative for dizziness, tingling, tremors, sensory change, speech change, focal weakness, seizures, loss of consciousness, weakness and headaches.    Results for orders placed or performed in visit on 01/20/22 VITAMIN D, 25 HYDROXY   Result Value Ref Range    Vitamin D 25-Hydroxy 29.7 (L) 30 - 462 ng/mL   METABOLIC PANEL, COMPREHENSIVE   Result Value Ref Range    Sodium 139 136 - 145 mmol/L    Potassium 4.7 3.5 - 5.1 mmol/L    Chloride 107 97 - 108 mmol/L    CO2 28 21 - 32 mmol/L    Anion gap 4 (L) 5 - 15 mmol/L    Glucose 114 (H) 65 - 100 mg/dL    BUN 22 (H) 6 - 20 MG/DL    Creatinine 0.77 0.55 - 1.02 MG/DL    BUN/Creatinine ratio 29 (H) 12 - 20      GFR est AA >60 >60 ml/min/1.73m2    GFR est non-AA >60 >60 ml/min/1.73m2    Calcium 9.7 8.5 - 10.1 MG/DL    Bilirubin, total 0.3 0.2 - 1.0 MG/DL    ALT (SGPT) 35 12 - 78 U/L    AST (SGOT) 19 15 - 37 U/L    Alk. phosphatase 79 45 - 117 U/L    Protein, total 7.0 6.4 - 8.2 g/dL    Albumin 4.0 3.5 - 5.0 g/dL    Globulin 3.0 2.0 - 4.0 g/dL    A-G Ratio 1.3 1.1 - 2.2     LIPID PANEL   Result Value Ref Range    Cholesterol, total 199 <200 MG/DL    Triglyceride 243 (H) <150 MG/DL    HDL Cholesterol 38 MG/DL    LDL, calculated 112.4 (H) 0 - 100 MG/DL    VLDL, calculated 48.6 MG/DL    CHOL/HDL Ratio 5.2 (H) 0.0 - 5.0             Physical Exam  Visit Vitals  /70 (BP 1 Location: Left upper arm, BP Patient Position: Sitting, BP Cuff Size: Large adult)   Pulse 71   Temp 98.4 °F (36.9 °C) (Oral)   Resp 16   Ht 5' 5\" (1.651 m)   Wt 293 lb (132.9 kg)   SpO2 99%   BMI 48.76 kg/m²         Head: Normocephalic, without obvious abnormality, atraumatic  Eyes: conjunctivae/corneas clear. PERRL, EOM's intact. Neck: supple, symmetrical, trachea midline, no adenopathy, thyroid: not enlarged, symmetric, no tenderness/mass/nodules, no carotid bruit and no JVD  Lungs: clear to auscultation bilaterally  Heart: regular rate and rhythm, S1, S2 normal, no murmur, click, rub or gallop.  Listened to over a minute and no ectopy appreciated  Extremities: extremities normal, atraumatic, no cyanosis or edema  Wrist: she has a less than 1 cm typical ganglion over the dorsum of her left wrist.   Pulses: 2+ and symmetric  Lymph nodes: Cervical, supraclavicular, and axillary nodes normal.  Neurologic: Grossly normal      ASSESSMENT and PLAN    ICD-10-CM ICD-9-CM    1. Elevated triglycerides with high cholesterol  E78.2 272.2 LIPID PANEL      METABOLIC PANEL, COMPREHENSIVE      METABOLIC PANEL, COMPREHENSIVE      LIPID PANEL      2. HTN (hypertension), benign  L90 421.3 METABOLIC PANEL, COMPREHENSIVE      METABOLIC PANEL, COMPREHENSIVE      3. Needs flu shot  Z23 V04.81 INFLUENZA, FLUARIX, FLULAVAL, FLUZONE (AGE 6 MO+), AFLURIA(AGE 3Y+) IM, PF, 0.5 ML      4. Vitamin D deficiency  E55.9 268.9       5. Colorectal Cancer- radiation& chemo after partial colectomy-2008  C19 154.0       6. Primary osteoarthritis involving multiple joints  M15.9 715.98       7. Morbid obesity with body mass index (BMI) of 45.0 to 49.9 in adult ([facility])  E66.01 278.01     Z68.42 V85.42       8. Adjustment disorder with mixed anxiety and depressed mood  F43.23 309.28       9. Dyslipidemia, goal LDL below 130  E78.5 272.4       10. Intermittent palpitations  R00.2 785.1         Diagnoses and all orders for this visit:    1. Elevated triglycerides with high cholesterol  -     LIPID PANEL; Future  -     METABOLIC PANEL, COMPREHENSIVE; Future    2. HTN (hypertension), benign  -     METABOLIC PANEL, COMPREHENSIVE; Future    3. Needs flu shot  -     INFLUENZA, FLUARIX, FLULAVAL, FLUZONE (AGE 6 MO+), AFLURIA(AGE 3Y+) IM, PF, 0.5 ML    4. Vitamin D deficiency    5. Colorectal Cancer- radiation& chemo after partial colectomy-2008    6. Primary osteoarthritis involving multiple joints    7. Morbid obesity with body mass index (BMI) of 45.0 to 49.9 in adult ([facility] [address]    8. Adjustment disorder with mixed anxiety and depressed mood    9. Dyslipidemia, goal LDL below 130    10.  Intermittent palpitations        lab results and schedule of future lab studies reviewed with patient  reviewed diet, exercise and weight control  cardiovascular risk and specific lipid/LDL goals reviewed  reviewed medications and side effects in detail  Please call my office if there are any questions- [phone]. I will arrange for follow up after the lab tests done from today return  Recommended a weekly \"heart check. \" I went into detail how to do this. Call for refills on medications as needed. Discussed expected course/resolution/complications of diagnosis in detail with patient. Medication risks/benefits/costs/interactions/alternatives discussed with patient. Pt was given an after visit summary which includes diagnoses, current medications & vitals. Pt expressed understanding with the diagnosis and plan    BMI is significantly elevated- in the morbidly obese range. I reviewed diet, exercise and weight control. Discussed weight control in detail, the importance of mainly decreased carbs, and for weight maintenance, exercise; discussed different diets and that it isn't as important to watch the type of foods as it is to decrease calorie intake no matter what type of diet you do, etc. Patient is aware that there are medications and surgery that are options to assist in weight loss. Mindful eating also discussed- Naturally Slim( now W[employer]) or Noom are 2 options. Should follow up monthly with an office visit if has interest in working on weight loss. Total 40 minutes  re: Review of  the proper technique of checking the blood pressure- check it on an average day only, not on a stressful day, sitting, no exercise for at least 1 hour and not experiencing any new pain( chronic pain is OK). Patient encouraged to check BP sitting and standing at least once a month and to report these readings to me if > 140/ 90 on average , or if the standing BP is >  15 points lower than the sitting. Always check it twice and if there is > 5 points decrease from the previous reading( top reading or systolic) keep checking it until it does not drop 5 points.  Write only this final reading down, not the preceding readings. If out of these readings there is only 1 out of 4 or less > 024, or > 90 diastolic then your blood pressure is OK; it needs further treatment if it is above this. Also, don't forget,  as noted above, to check your blood pressure standing once a month; this is to detect a drop in your BP that might lead to fainting and serious injury; you check it standing with your arm hanging straight down and relaxed. Check it twice waiting 1 minute between the two readings. If, with either one of these 2 readings there is a > 15 point drop of the systolic compared to your sitting pressure( done before the standing BP), then let me know. Following these guidelines, continue to check your BP and write down only the ones described above and it will help me to effectively treat your blood pressure. Reviewed symptoms, or lack thereof, of hypertension and elevated cholesterol. Regular exercise is very important to your health; it helps mentally, physically, socially; it prevents injuries if done properly. Exercise, even as simple as walking 20-30 minutes daily has major benefits to your health even though your \"numbers\" are the same in the lab. See if you can add this into your daily regimen and after a few months it will become a regular habit-\"just something you do,\" like brushing your teeth. A combination of aerobic exercise and strengthening and stretching is felt to be the best for you, so this should be your ultimate goal.   This can be done in the privacy of your home or in a group setting as at the gym  Some prefer having a , others prefer to do exercise in groups or individually. Do what \"works\" for you. You need to make it simple and \"fun,\" or you most likely will not continue it. Recommended a weekly \"heart check. \" I went into detail how to do this.  Also, discussed symptoms of concern that were noted today in the note above, treatment options( including doing nothing), when to follow up before recommended time frame. Also, answered all questions. Told pt that unless the ganglion changes, there is no need to treat it unless it causes pain or is enlarging. As far as her palpitations since there are no other symptoms, this must have been some type of tachycardia. I suggested that she notify us in the future if that returns but since there is nothing found on exam today, we will hold off on the testing. I did encourage her to check her pulse if she has another episode and look for two things. Either a rapid heart rate, her heart rate is usually in the 70's, or an irregular heart rate, and let us know the result of hat if she has another episode. I congratulated her on her sustained weight loss over the past couple of years and encouraged her to continue the same. Talked about adding a statin to give protection against CAD and she will let us know if she decides to do this. This document was written by Leland Mistry, as dictated by Renetta Salomon MD.   I have reviewed and agree with the above note and have made corrections where appropriate Adalberto Aldridge M.D.

## 2022-10-18 LAB
ALBUMIN SERPL-MCNC: 3.9 G/DL (ref 3.5–5)
ALBUMIN/GLOB SERPL: 1.3 {RATIO} (ref 1.1–2.2)
ALP SERPL-CCNC: 68 U/L (ref 45–117)
ALT SERPL-CCNC: 34 U/L (ref 12–78)
ANION GAP SERPL CALC-SCNC: 2 MMOL/L (ref 5–15)
AST SERPL-CCNC: 19 U/L (ref 15–37)
BILIRUB SERPL-MCNC: 0.3 MG/DL (ref 0.2–1)
BUN SERPL-MCNC: 20 MG/DL (ref 6–20)
BUN/CREAT SERPL: 29 (ref 12–20)
CALCIUM SERPL-MCNC: 9.5 MG/DL (ref 8.5–10.1)
CHLORIDE SERPL-SCNC: 107 MMOL/L (ref 97–108)
CHOLEST SERPL-MCNC: 207 MG/DL
CO2 SERPL-SCNC: 31 MMOL/L (ref 21–32)
CREAT SERPL-MCNC: 0.7 MG/DL (ref 0.55–1.02)
GLOBULIN SER CALC-MCNC: 2.9 G/DL (ref 2–4)
GLUCOSE SERPL-MCNC: 98 MG/DL (ref 65–100)
HDLC SERPL-MCNC: 48 MG/DL
HDLC SERPL: 4.3 {RATIO} (ref 0–5)
LDLC SERPL CALC-MCNC: 115.4 MG/DL (ref 0–100)
POTASSIUM SERPL-SCNC: 4.9 MMOL/L (ref 3.5–5.1)
PROT SERPL-MCNC: 6.8 G/DL (ref 6.4–8.2)
SODIUM SERPL-SCNC: 140 MMOL/L (ref 136–145)
TRIGL SERPL-MCNC: 218 MG/DL (ref ?–150)
VLDLC SERPL CALC-MCNC: 43.6 MG/DL

## 2022-10-31 ENCOUNTER — TRANSCRIBE ORDER (OUTPATIENT)
Dept: SCHEDULING | Age: 57
End: 2022-10-31

## 2022-10-31 DIAGNOSIS — Z12.31 SCREENING MAMMOGRAM FOR HIGH-RISK PATIENT: Primary | ICD-10-CM

## 2022-12-07 ENCOUNTER — HOSPITAL ENCOUNTER (OUTPATIENT)
Dept: MAMMOGRAPHY | Age: 57
Discharge: HOME | End: 2022-12-07
Attending: OBSTETRICS & GYNECOLOGY
Payer: COMMERCIAL

## 2022-12-07 DIAGNOSIS — Z12.31 SCREENING MAMMOGRAM FOR HIGH-RISK PATIENT: ICD-10-CM

## 2022-12-07 PROCEDURE — 77067 SCR MAMMO BI INCL CAD: CPT

## 2023-03-20 ENCOUNTER — OFFICE VISIT (OUTPATIENT)
Dept: FAMILY MEDICINE CLINIC | Age: 58
End: 2023-03-20
Payer: COMMERCIAL

## 2023-03-20 VITALS
HEIGHT: 65 IN | OXYGEN SATURATION: 97 % | WEIGHT: 293 LBS | BODY MASS INDEX: 48.82 KG/M2 | SYSTOLIC BLOOD PRESSURE: 136 MMHG | DIASTOLIC BLOOD PRESSURE: 76 MMHG | HEART RATE: 76 BPM | RESPIRATION RATE: 16 BRPM | TEMPERATURE: 97.8 F

## 2023-03-20 DIAGNOSIS — F43.23 ADJUSTMENT DISORDER WITH MIXED ANXIETY AND DEPRESSED MOOD: ICD-10-CM

## 2023-03-20 DIAGNOSIS — E55.9 VITAMIN D DEFICIENCY: ICD-10-CM

## 2023-03-20 DIAGNOSIS — I10 HTN (HYPERTENSION), BENIGN: Primary | ICD-10-CM

## 2023-03-20 DIAGNOSIS — Z79.1 LONG TERM (CURRENT) USE OF NON-STEROIDAL ANTI-INFLAMMATORIES (NSAID): ICD-10-CM

## 2023-03-20 DIAGNOSIS — C19 COLORECTAL CANCER (HCC): ICD-10-CM

## 2023-03-20 DIAGNOSIS — M15.9 PRIMARY OSTEOARTHRITIS INVOLVING MULTIPLE JOINTS: ICD-10-CM

## 2023-03-20 DIAGNOSIS — E66.01 MORBID OBESITY WITH BMI OF 45.0-49.9, ADULT (HCC): ICD-10-CM

## 2023-03-20 PROCEDURE — 3078F DIAST BP <80 MM HG: CPT | Performed by: FAMILY MEDICINE

## 2023-03-20 PROCEDURE — 3075F SYST BP GE 130 - 139MM HG: CPT | Performed by: FAMILY MEDICINE

## 2023-03-20 PROCEDURE — 99214 OFFICE O/P EST MOD 30 MIN: CPT | Performed by: FAMILY MEDICINE

## 2023-03-20 RX ORDER — FLUOXETINE HYDROCHLORIDE 20 MG/1
20 CAPSULE ORAL DAILY
Qty: 30 CAPSULE | Refills: 5 | Status: SHIPPED | OUTPATIENT
Start: 2023-03-20

## 2023-03-20 RX ORDER — OMEPRAZOLE 20 MG/1
20 CAPSULE, DELAYED RELEASE ORAL DAILY
Qty: 30 CAPSULE | Refills: 5 | Status: SHIPPED | OUTPATIENT
Start: 2023-03-20

## 2023-03-20 RX ORDER — MELOXICAM 15 MG/1
TABLET ORAL
Qty: 30 TABLET | Refills: 5 | Status: SHIPPED | OUTPATIENT
Start: 2023-03-20

## 2023-03-20 RX ORDER — LISINOPRIL 10 MG/1
10 TABLET ORAL DAILY
Qty: 30 TABLET | Refills: 5 | Status: SHIPPED | OUTPATIENT
Start: 2023-03-20

## 2023-03-20 NOTE — PROGRESS NOTES
HISTORY OF PRESENT ILLNESS  HPI  Little Wiseman is a 61 y.o. female with a history of HTN, colorectal cancer, osteoarthritis, adjustment disorder with anxiety and depression, vitamin D deficiency, muscle spasm of left lower extremity and arthralgia of left hip, who presents today for f/u of these health problems. Pt does not check her BP at home. Pt denies unusual SOB, chest pain, and any recent ER visits or hospitalizations. Past Medical History:   Diagnosis Date    Adjustment disorder with mixed anxiety and depressed mood     Colorectal Cancer- radiation& chemo after partial colectomy     2008    DJD (degenerative joint disease) 11/29/2010    Elevated triglycerides with high cholesterol 9/29/2014    Encounter for immunization 10/1/2015    HTN (hypertension), benign 3/14/2016    Primary osteoarthritis involving multiple joints 10/1/2015    Vitamin D deficiency 9/29/2014     Past Surgical History:   Procedure Laterality Date    HX BREAST BIOPSY Bilateral     benign bx's bilateral    HX TOTAL COLECTOMY  partial for sigmoid cancer- 9/2008    MO BIOPSY BREAST OPEN INCISIONAL  left-2009-neg. Current Outpatient Medications on File Prior to Visit   Medication Sig Dispense Refill    potassium 99 mg tablet Take 99 mg by mouth daily. MAGNESIUM CARBONATE 250 Units by Does Not Apply route daily. calcium 500 mg tab Take  by mouth daily. cholecalciferol (VITAMIN D3) 25 mcg (1,000 unit) cap Take 4,000 Units by mouth daily (after breakfast). [DISCONTINUED] lisinopriL (PRINIVIL, ZESTRIL) 10 mg tablet TAKE ONE TABLET BY MOUTH ONCE DAILY. 30 Tablet 1    [DISCONTINUED] meloxicam (MOBIC) 15 mg tablet TAKE ONE TABLET BY MOUTH ONCE DAILY, PREGERABLY AFTER A MEAL. 30 Tablet 1    [DISCONTINUED] FLUoxetine (PROzac) 20 mg capsule TAKE ONE CAPSULE BY MOUTH ONCE DAILY. 30 Capsule 1    [DISCONTINUED] omeprazole (PRILOSEC) 20 mg capsule TAKE ONE CAPSULE BY MOUTH ONCE DAILY.  30 Capsule 1     No current facility-administered medications on file prior to visit. Allergies   Allergen Reactions    Sulfa (Sulfonamide Antibiotics) Hives     Family History   Problem Relation Age of Onset    Alzheimer's Disease Mother     Cancer Father         colo rectal cancer    Cancer Sister         breast left     Breast Cancer Sister 39     Social History     Socioeconomic History    Marital status:    Tobacco Use    Smoking status: Never    Smokeless tobacco: Never   Vaping Use    Vaping Use: Never used   Substance and Sexual Activity    Alcohol use: No    Drug use: No    Sexual activity: Never     Birth control/protection: None     Social Determinants of Health     Financial Resource Strain: Low Risk     Difficulty of Paying Living Expenses: Not very hard   Food Insecurity: No Food Insecurity    Worried About Running Out of Food in the Last Year: Never true    Ran Out of Food in the Last Year: Never true               Review of Systems   Constitutional:  Negative for chills, diaphoresis, fever, malaise/fatigue and weight loss. Eyes:  Negative for blurred vision, double vision, pain and redness. Respiratory:  Negative for cough, shortness of breath and wheezing. Cardiovascular:  Negative for chest pain, palpitations, orthopnea, claudication, leg swelling and PND. Skin:  Negative for itching and rash. Neurological:  Negative for dizziness, tingling, tremors, sensory change, speech change, focal weakness, seizures, loss of consciousness, weakness and headaches.    Results for orders placed or performed in visit on 23/10/04   METABOLIC PANEL, COMPREHENSIVE   Result Value Ref Range    Sodium 140 136 - 145 mmol/L    Potassium 4.9 3.5 - 5.1 mmol/L    Chloride 107 97 - 108 mmol/L    CO2 31 21 - 32 mmol/L    Anion gap 2 (L) 5 - 15 mmol/L    Glucose 98 65 - 100 mg/dL    BUN 20 6 - 20 MG/DL    Creatinine 0.70 0.55 - 1.02 MG/DL    BUN/Creatinine ratio 29 (H) 12 - 20      eGFR >60 >60 ml/min/1.73m2    Calcium 9.5 8.5 - 10.1 MG/DL    Bilirubin, total 0.3 0.2 - 1.0 MG/DL    ALT (SGPT) 34 12 - 78 U/L    AST (SGOT) 19 15 - 37 U/L    Alk. phosphatase 68 45 - 117 U/L    Protein, total 6.8 6.4 - 8.2 g/dL    Albumin 3.9 3.5 - 5.0 g/dL    Globulin 2.9 2.0 - 4.0 g/dL    A-G Ratio 1.3 1.1 - 2.2     LIPID PANEL   Result Value Ref Range    Cholesterol, total 207 (H) <200 MG/DL    Triglyceride 218 (H) <150 MG/DL    HDL Cholesterol 48 MG/DL    LDL, calculated 115.4 (H) 0 - 100 MG/DL    VLDL, calculated 43.6 MG/DL    CHOL/HDL Ratio 4.3 0.0 - 5.0             Physical Exam  Visit Vitals  /76 (BP 1 Location: Left upper arm, BP Patient Position: Sitting, BP Cuff Size: Large adult)   Pulse 76   Temp 97.8 °F (36.6 °C) (Temporal)   Resp 16   Ht 5' 5\" (1.651 m)   Wt 300 lb (136.1 kg)   SpO2 97%   BMI 49.92 kg/m²       Head: Normocephalic, without obvious abnormality, atraumatic  Eyes: conjunctivae/corneas clear. PERRL, EOM's intact. Neck: supple, symmetrical, trachea midline, no adenopathy, thyroid: not enlarged, symmetric, no tenderness/mass/nodules, no carotid bruit and no JVD  Lungs: clear to auscultation bilaterally  Heart: regular rate and rhythm, S1, S2 normal, no murmur, click, rub or gallop  Extremities: extremities normal, atraumatic, no cyanosis or edema  Pulses: 2+ and symmetric  Lymph nodes: Cervical, supraclavicular, and axillary nodes normal.  Neurologic: Grossly normal        ASSESSMENT and PLAN    ICD-10-CM ICD-9-CM    1. HTN (hypertension), benign  I10 401.1 LIPID PANEL      METABOLIC PANEL, COMPREHENSIVE      METABOLIC PANEL, COMPREHENSIVE      LIPID PANEL      lisinopriL (PRINIVIL, ZESTRIL) 10 mg tablet      2. Primary osteoarthritis involving multiple joints  M15.9 715.98 meloxicam (MOBIC) 15 mg tablet      3. Adjustment disorder with mixed anxiety and depressed mood  F43.23 309.28 FLUoxetine (PROzac) 20 mg capsule      4.  Long term (current) use of non-steroidal anti-inflammatories (nsaid)  Z79.1 V58.64 omeprazole (PRILOSEC) 20 mg capsule      5. Vitamin D deficiency  E55.9 268.9       6. Morbid obesity with BMI of 45.0-49.9, adult ([facility])  E66.01 278.01     Z68.42 V85.42       7. Colorectal cancer ([facility]  C19 154.0         Diagnoses and all orders for this visit:    1. HTN (hypertension), benign  -     LIPID PANEL; Future  -     METABOLIC PANEL, COMPREHENSIVE; Future  -     lisinopriL (PRINIVIL, ZESTRIL) 10 mg tablet; Take 1 Tablet by mouth daily. 2. Primary osteoarthritis involving multiple joints  -     meloxicam (MOBIC) 15 mg tablet; TAKE ONE TABLET BY MOUTH ONCE DAILY, PREGERABLY AFTER A MEAL. 3. Adjustment disorder with mixed anxiety and depressed mood  -     FLUoxetine (PROzac) 20 mg capsule; Take 1 Capsule by mouth daily. 4. Long term (current) use of non-steroidal anti-inflammatories (nsaid)  -     omeprazole (PRILOSEC) 20 mg capsule; Take 1 Capsule by mouth daily. 5. Vitamin D deficiency    6. Morbid obesity with BMI of 45.0-49.9, adult ([facility]    7. Colorectal cancer ([facility]    Follow-up and Dispositions    Return in about 6 months (around 9/20/2023) for F/U hypertension, f/u Vitamin D deficiency, F/U of obesity. lab results and schedule of future lab studies reviewed with patient  reviewed diet, exercise and weight control  cardiovascular risk and specific lipid/LDL goals reviewed  reviewed medications and side effects in detail  Please call my office if there are any questions- [phone]. I will arrange for follow up after the lab tests done from today return  Recommended a weekly \"heart check. \" I went into detail how to do this. Call for refills on medications as needed. Discussed expected course/resolution/complications of diagnosis in detail with patient. Medication risks/benefits/costs/interactions/alternatives discussed with patient. Pt was given an after visit summary which includes diagnoses, current medications & vitals.    Pt expressed understanding with the diagnosis and plan    BMI is significantly elevated- in the morbidly obese range. I reviewed diet, exercise and weight control. Discussed weight control in detail, the importance of mainly decreased carbs, and for weight maintenance, exercise; discussed different diets and that it isn't as important to watch the type of foods as it is to decrease calorie intake no matter what type of diet you do, etc. Patient is aware that there are medications and surgery that are options to assist in weight loss. Mindful eating also discussed- Naturally Slim( now Wond'r) or Noom are 2 options. Should follow up monthly with an office visit if has interest in working on weight loss. Total 30 minutes  re: Recommended a weekly \"heart check. \" I went into detail how to do this. Regular exercise is very important to your health; it helps mentally, physically, socially; it prevents injuries if done properly. Exercise, even as simple as walking 20-30 minutes daily has major benefits to your health even though your \"numbers\" are the same in the lab. See if you can add this into your daily regimen and after a few months it will become a regular habit-\"just something you do,\" like brushing your teeth. A combination of aerobic exercise and strengthening and stretching is felt to be the best for you, so this should be your ultimate goal.   This can be done in the privacy of your home or in a group setting as at the gym  Some prefer having a , others prefer to do exercise in groups or individually. Do what \"works\" for you. You need to make it simple and \"fun,\" or you most likely will not continue it. Reviewed symptoms, or lack thereof, of hypertension and elevated cholesterol. Review of  the proper technique of checking the blood pressure- check it on an average day only, not on a stressful day, sitting, no exercise for at least 1 hour and not experiencing any new pain( chronic pain is OK).  Patient encouraged to check BP sitting and standing at least once a month and to report these readings to me if > 140/ 90 on average , or if the standing BP is >  15 points lower than the sitting. Always check it twice and if there is > 5 points decrease from the previous reading( top reading or systolic) keep checking it until it does not drop 5 points. Write only this final reading down, not the preceding readings. If out of these readings there is only 1 out of 4 or less > 308, or > 90 diastolic then your blood pressure is OK; it needs further treatment if it is above this. Also, don't forget,  as noted above, to check your blood pressure standing once a month; this is to detect a drop in your BP that might lead to fainting and serious injury; you check it standing with your arm hanging straight down and relaxed. Check it twice waiting 1 minute between the two readings. If, with either one of these 2 readings there is a > 15 point drop of the systolic compared to your sitting pressure( done before the standing BP), then let me know. Following these guidelines, continue to check your BP and write down only the ones described above and it will help me to effectively treat your blood pressure. Also, discussed symptoms of concern that were noted today in the note above, treatment options( including doing nothing), when to follow up before recommended time frame. Also, answered all questions. Pt overall has lost some weight over the past 5 years, about a pound and a half each year, so I encouraged her to continue with that. She does walk on a regular basis during her break at work and does get short of breath walking up hill, but not to the point she needs to stop. I encouraged her to let us know if she does get to that point, catching her breath at the top or assisted up. We also talked about her FHx of several family members dying of unclear causes (they did not see a doctor regularly) and most likely these are heart attack or stroke.  I encouraged her to start a statin earlier than we normally do and she was interested in doing that, so we will start her on rosuvastatin after her lab work assuming it is normal.   This document was written by Valeria Patterson, as dictated by Stevenson Kathleen MD.

## 2023-03-20 NOTE — PROGRESS NOTES
Chief Complaint   Patient presents with    Hypertension    1. \"Have you been to the ER, urgent care clinic since your last visit? Hospitalized since your last visit? \" No    2. \"Have you seen or consulted any other health care providers outside of the [address] [place] [address] since your last visit? \" No     3. For patients over 45: Has the patient had a colonoscopy? Yes - no Care Gap present     If the patient is female:    4. For patients over 40: Has the patient had a mammogram? Yes - no Care Gap present    5. For patients over 21: Has the patient had a pap smear?  No appt ben

## 2023-03-21 LAB
ALBUMIN SERPL-MCNC: 3.9 G/DL (ref 3.5–5)
ALBUMIN/GLOB SERPL: 1.5 (ref 1.1–2.2)
ALP SERPL-CCNC: 77 U/L (ref 45–117)
ALT SERPL-CCNC: 32 U/L (ref 12–78)
ANION GAP SERPL CALC-SCNC: 2 MMOL/L (ref 5–15)
AST SERPL-CCNC: 21 U/L (ref 15–37)
BILIRUB SERPL-MCNC: 0.2 MG/DL (ref 0.2–1)
BUN SERPL-MCNC: 18 MG/DL (ref 6–20)
BUN/CREAT SERPL: 25 (ref 12–20)
CALCIUM SERPL-MCNC: 9.2 MG/DL (ref 8.5–10.1)
CHLORIDE SERPL-SCNC: 106 MMOL/L (ref 97–108)
CHOLEST SERPL-MCNC: 200 MG/DL
CO2 SERPL-SCNC: 31 MMOL/L (ref 21–32)
CREAT SERPL-MCNC: 0.72 MG/DL (ref 0.55–1.02)
GLOBULIN SER CALC-MCNC: 2.6 G/DL (ref 2–4)
GLUCOSE SERPL-MCNC: 104 MG/DL (ref 65–100)
HDLC SERPL-MCNC: 46 MG/DL
HDLC SERPL: 4.3 (ref 0–5)
LDLC SERPL CALC-MCNC: 111.8 MG/DL (ref 0–100)
POTASSIUM SERPL-SCNC: 4.7 MMOL/L (ref 3.5–5.1)
PROT SERPL-MCNC: 6.5 G/DL (ref 6.4–8.2)
SODIUM SERPL-SCNC: 139 MMOL/L (ref 136–145)
TRIGL SERPL-MCNC: 211 MG/DL (ref ?–150)
VLDLC SERPL CALC-MCNC: 42.2 MG/DL

## 2023-05-12 ENCOUNTER — OFFICE VISIT (OUTPATIENT)
Age: 58
End: 2023-05-12

## 2023-05-12 VITALS
DIASTOLIC BLOOD PRESSURE: 75 MMHG | TEMPERATURE: 97.3 F | SYSTOLIC BLOOD PRESSURE: 127 MMHG | HEART RATE: 85 BPM | OXYGEN SATURATION: 96 % | BODY MASS INDEX: 49.09 KG/M2 | WEIGHT: 293 LBS | RESPIRATION RATE: 18 BRPM

## 2023-05-12 DIAGNOSIS — J06.9 ACUTE URI: Primary | ICD-10-CM

## 2023-05-12 DIAGNOSIS — R07.89 CHEST TIGHTNESS: ICD-10-CM

## 2023-05-12 LAB
GROUP A STREP ANTIGEN, POC: NEGATIVE
Lab: NORMAL
QC PASS/FAIL: NORMAL
SARS-COV-2, POC: NORMAL
VALID INTERNAL CONTROL, POC: YES

## 2023-05-12 RX ORDER — CALCIUM CARBONATE 500(1250)
TABLET ORAL DAILY
COMMUNITY

## 2023-05-12 RX ORDER — PREDNISONE 20 MG/1
20 TABLET ORAL 2 TIMES DAILY
Qty: 10 TABLET | Refills: 0 | Status: SHIPPED | OUTPATIENT
Start: 2023-05-12 | End: 2023-05-17
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
## 2023-10-02 ENCOUNTER — OFFICE VISIT (OUTPATIENT)
Age: 58
End: 2023-10-02

## 2023-10-02 VITALS
SYSTOLIC BLOOD PRESSURE: 120 MMHG | HEIGHT: 65 IN | RESPIRATION RATE: 18 BRPM | HEART RATE: 68 BPM | TEMPERATURE: 98.2 F | OXYGEN SATURATION: 96 % | DIASTOLIC BLOOD PRESSURE: 68 MMHG | WEIGHT: 293 LBS | BODY MASS INDEX: 48.82 KG/M2

## 2023-10-02 DIAGNOSIS — C19 MALIGNANT NEOPLASM OF RECTOSIGMOID JUNCTION (HCC): ICD-10-CM

## 2023-10-02 DIAGNOSIS — M15.9 PRIMARY OSTEOARTHRITIS INVOLVING MULTIPLE JOINTS: ICD-10-CM

## 2023-10-02 DIAGNOSIS — I10 HTN (HYPERTENSION), BENIGN: Primary | ICD-10-CM

## 2023-10-02 DIAGNOSIS — E78.5 DYSLIPIDEMIA, GOAL LDL BELOW 130: ICD-10-CM

## 2023-10-02 DIAGNOSIS — E55.9 VITAMIN D DEFICIENCY, UNSPECIFIED: ICD-10-CM

## 2023-10-02 DIAGNOSIS — K21.9 GASTROESOPHAGEAL REFLUX DISEASE WITHOUT ESOPHAGITIS: ICD-10-CM

## 2023-10-02 DIAGNOSIS — Z11.59 NEED FOR HEPATITIS C SCREENING TEST: ICD-10-CM

## 2023-10-02 DIAGNOSIS — F43.23 ADJUSTMENT DISORDER WITH MIXED ANXIETY AND DEPRESSED MOOD: ICD-10-CM

## 2023-10-02 DIAGNOSIS — Z79.1 LONG TERM (CURRENT) USE OF NON-STEROIDAL ANTI-INFLAMMATORIES (NSAID): ICD-10-CM

## 2023-10-02 LAB
ALBUMIN SERPL-MCNC: 4 G/DL (ref 3.5–5)
ALBUMIN/GLOB SERPL: 1.4 (ref 1.1–2.2)
ALP SERPL-CCNC: 72 U/L (ref 45–117)
ALT SERPL-CCNC: 36 U/L (ref 12–78)
ANION GAP SERPL CALC-SCNC: 3 MMOL/L (ref 5–15)
AST SERPL-CCNC: 20 U/L (ref 15–37)
BILIRUB SERPL-MCNC: 0.4 MG/DL (ref 0.2–1)
BUN SERPL-MCNC: 19 MG/DL (ref 6–20)
BUN/CREAT SERPL: 25 (ref 12–20)
CALCIUM SERPL-MCNC: 9.6 MG/DL (ref 8.5–10.1)
CHLORIDE SERPL-SCNC: 105 MMOL/L (ref 97–108)
CHOLEST SERPL-MCNC: 229 MG/DL
CO2 SERPL-SCNC: 29 MMOL/L (ref 21–32)
CREAT SERPL-MCNC: 0.77 MG/DL (ref 0.55–1.02)
GLOBULIN SER CALC-MCNC: 2.8 G/DL (ref 2–4)
GLUCOSE SERPL-MCNC: 111 MG/DL (ref 65–100)
HDLC SERPL-MCNC: 40 MG/DL
HDLC SERPL: 5.7 (ref 0–5)
LDLC SERPL CALC-MCNC: 125.2 MG/DL (ref 0–100)
POTASSIUM SERPL-SCNC: 4.8 MMOL/L (ref 3.5–5.1)
PROT SERPL-MCNC: 6.8 G/DL (ref 6.4–8.2)
SODIUM SERPL-SCNC: 137 MMOL/L (ref 136–145)
TRIGL SERPL-MCNC: 319 MG/DL
VLDLC SERPL CALC-MCNC: 63.8 MG/DL

## 2023-10-02 PROCEDURE — 99215 OFFICE O/P EST HI 40 MIN: CPT | Performed by: FAMILY MEDICINE

## 2023-10-02 PROCEDURE — 3074F SYST BP LT 130 MM HG: CPT | Performed by: FAMILY MEDICINE

## 2023-10-02 PROCEDURE — 3078F DIAST BP <80 MM HG: CPT | Performed by: FAMILY MEDICINE

## 2023-10-02 RX ORDER — MELOXICAM 15 MG/1
15 TABLET ORAL DAILY
Qty: 90 TABLET | Refills: 1 | Status: SHIPPED | OUTPATIENT
Start: 2023-10-02

## 2023-10-02 RX ORDER — OMEPRAZOLE 20 MG/1
20 CAPSULE, DELAYED RELEASE ORAL DAILY
Qty: 90 CAPSULE | Refills: 1 | Status: SHIPPED | OUTPATIENT
Start: 2023-10-02

## 2023-10-02 RX ORDER — LISINOPRIL 10 MG/1
10 TABLET ORAL DAILY
Qty: 90 TABLET | Refills: 1 | Status: SHIPPED | OUTPATIENT
Start: 2023-10-02

## 2023-10-02 RX ORDER — FLUOXETINE HYDROCHLORIDE 20 MG/1
20 CAPSULE ORAL DAILY
Qty: 90 CAPSULE | Refills: 1 | Status: SHIPPED | OUTPATIENT
Start: 2023-10-02

## 2023-10-02 SDOH — ECONOMIC STABILITY: FOOD INSECURITY: WITHIN THE PAST 12 MONTHS, THE FOOD YOU BOUGHT JUST DIDN'T LAST AND YOU DIDN'T HAVE MONEY TO GET MORE.: NEVER TRUE

## 2023-10-02 SDOH — ECONOMIC STABILITY: HOUSING INSECURITY
IN THE LAST 12 MONTHS, WAS THERE A TIME WHEN YOU DID NOT HAVE A STEADY PLACE TO SLEEP OR SLEPT IN A SHELTER (INCLUDING NOW)?: NO

## 2023-10-02 SDOH — ECONOMIC STABILITY: FOOD INSECURITY: WITHIN THE PAST 12 MONTHS, YOU WORRIED THAT YOUR FOOD WOULD RUN OUT BEFORE YOU GOT MONEY TO BUY MORE.: NEVER TRUE

## 2023-10-02 SDOH — ECONOMIC STABILITY: INCOME INSECURITY: HOW HARD IS IT FOR YOU TO PAY FOR THE VERY BASICS LIKE FOOD, HOUSING, MEDICAL CARE, AND HEATING?: NOT HARD AT ALL

## 2023-10-02 ASSESSMENT — ENCOUNTER SYMPTOMS
EYE PAIN: 0
EYE REDNESS: 0
WHEEZING: 0
SHORTNESS OF BREATH: 0
COUGH: 0

## 2023-10-02 ASSESSMENT — PATIENT HEALTH QUESTIONNAIRE - PHQ9
SUM OF ALL RESPONSES TO PHQ QUESTIONS 1-9: 0
1. LITTLE INTEREST OR PLEASURE IN DOING THINGS: 0
SUM OF ALL RESPONSES TO PHQ9 QUESTIONS 1 & 2: 0
2. FEELING DOWN, DEPRESSED OR HOPELESS: 0
SUM OF ALL RESPONSES TO PHQ QUESTIONS 1-9: 0

## 2023-10-03 LAB
HCV AB SER IA-ACNC: 0.1 INDEX
HCV AB SERPL QL IA: NONREACTIVE

## 2023-12-29 ENCOUNTER — HOSPITAL ENCOUNTER (OUTPATIENT)
Facility: HOSPITAL | Age: 58
Discharge: HOME | End: 2023-12-29
Attending: OBSTETRICS & GYNECOLOGY
Payer: COMMERCIAL

## 2023-12-29 VITALS — HEIGHT: 66 IN | WEIGHT: 290 LBS | BODY MASS INDEX: 46.61 KG/M2

## 2023-12-29 DIAGNOSIS — Z12.31 VISIT FOR SCREENING MAMMOGRAM: ICD-10-CM

## 2023-12-29 PROCEDURE — 77067 SCR MAMMO BI INCL CAD: CPT

## 2024-01-22 ENCOUNTER — E-VISIT (OUTPATIENT)
Age: 59
End: 2024-01-22

## 2024-01-22 ENCOUNTER — TELEMEDICINE (OUTPATIENT)
Age: 59
End: 2024-01-22

## 2024-01-22 DIAGNOSIS — E78.5 DYSLIPIDEMIA, GOAL LDL BELOW 130: ICD-10-CM

## 2024-01-22 DIAGNOSIS — I10 HTN (HYPERTENSION), BENIGN: ICD-10-CM

## 2024-01-22 DIAGNOSIS — J06.9 VIRAL URI WITH COUGH: Primary | ICD-10-CM

## 2024-01-22 DIAGNOSIS — E66.01 MORBID (SEVERE) OBESITY DUE TO EXCESS CALORIES (HCC): ICD-10-CM

## 2024-01-22 DIAGNOSIS — M15.9 PRIMARY OSTEOARTHRITIS INVOLVING MULTIPLE JOINTS: ICD-10-CM

## 2024-01-22 DIAGNOSIS — U07.1 COVID-19 VIRUS INFECTION: Primary | ICD-10-CM

## 2024-01-22 PROCEDURE — 99213 OFFICE O/P EST LOW 20 MIN: CPT | Performed by: FAMILY MEDICINE

## 2024-01-22 ASSESSMENT — PATIENT HEALTH QUESTIONNAIRE - PHQ9
SUM OF ALL RESPONSES TO PHQ QUESTIONS 1-9: 0
SUM OF ALL RESPONSES TO PHQ QUESTIONS 1-9: 0
1. LITTLE INTEREST OR PLEASURE IN DOING THINGS: 0
SUM OF ALL RESPONSES TO PHQ QUESTIONS 1-9: 0
2. FEELING DOWN, DEPRESSED OR HOPELESS: 0
SUM OF ALL RESPONSES TO PHQ9 QUESTIONS 1 & 2: 0
SUM OF ALL RESPONSES TO PHQ QUESTIONS 1-9: 0

## 2024-01-22 ASSESSMENT — ENCOUNTER SYMPTOMS
SHORTNESS OF BREATH: 0
EYE PAIN: 0
COUGH: 0
EYE REDNESS: 0
WHEEZING: 0

## 2024-01-22 ASSESSMENT — LIFESTYLE VARIABLES: SMOKING_STATUS: NO, I HAVE NEVER SMOKED

## 2024-01-22 NOTE — PROGRESS NOTES
Chief Complaint   Patient presents with    Chills     Positive for covid today    Diarrhea    Cough      \"Have you been to the ER, urgent care clinic since your last visit?  Hospitalized since your last visit?\"    NO    “Have you seen or consulted any other health care providers outside of [facility] since your last visit?”    NO        “Have you had a pap smear?”    NO       
interactions. Drink plenty of fluids, and give us a call if she has any worsening of her symptoms.     This document was written by Jai Knight, as dictated by Anam Salvador MD.   I have reviewed and agree with the above note and have made corrections where appropriate Anam Salvador M.D.

## 2024-05-08 DIAGNOSIS — I10 HTN (HYPERTENSION), BENIGN: ICD-10-CM

## 2024-05-08 RX ORDER — LISINOPRIL 10 MG/1
10 TABLET ORAL DAILY
Qty: 90 TABLET | Refills: 1 | Status: SHIPPED | OUTPATIENT
Start: 2024-05-08

## 2024-05-15 ENCOUNTER — OFFICE VISIT (OUTPATIENT)
Age: 59
End: 2024-05-15
Payer: COMMERCIAL

## 2024-05-15 VITALS
DIASTOLIC BLOOD PRESSURE: 78 MMHG | BODY MASS INDEX: 47.09 KG/M2 | HEART RATE: 72 BPM | WEIGHT: 293 LBS | TEMPERATURE: 98 F | OXYGEN SATURATION: 99 % | RESPIRATION RATE: 16 BRPM | HEIGHT: 66 IN | SYSTOLIC BLOOD PRESSURE: 122 MMHG

## 2024-05-15 DIAGNOSIS — E55.9 VITAMIN D DEFICIENCY, UNSPECIFIED: ICD-10-CM

## 2024-05-15 DIAGNOSIS — K21.9 GASTROESOPHAGEAL REFLUX DISEASE WITHOUT ESOPHAGITIS: ICD-10-CM

## 2024-05-15 DIAGNOSIS — I10 ESSENTIAL (PRIMARY) HYPERTENSION: ICD-10-CM

## 2024-05-15 DIAGNOSIS — E78.5 DYSLIPIDEMIA, GOAL LDL BELOW 130: Primary | ICD-10-CM

## 2024-05-15 DIAGNOSIS — E55.9 VITAMIN D DEFICIENCY: ICD-10-CM

## 2024-05-15 PROCEDURE — 99214 OFFICE O/P EST MOD 30 MIN: CPT | Performed by: FAMILY MEDICINE

## 2024-05-15 PROCEDURE — 3078F DIAST BP <80 MM HG: CPT | Performed by: FAMILY MEDICINE

## 2024-05-15 PROCEDURE — 3074F SYST BP LT 130 MM HG: CPT | Performed by: FAMILY MEDICINE

## 2024-05-15 ASSESSMENT — ENCOUNTER SYMPTOMS
COUGH: 0
EYE REDNESS: 0
WHEEZING: 0
SHORTNESS OF BREATH: 0
EYE PAIN: 0

## 2024-05-15 ASSESSMENT — PATIENT HEALTH QUESTIONNAIRE - PHQ9
SUM OF ALL RESPONSES TO PHQ QUESTIONS 1-9: 0
SUM OF ALL RESPONSES TO PHQ9 QUESTIONS 1 & 2: 0
SUM OF ALL RESPONSES TO PHQ QUESTIONS 1-9: 0
1. LITTLE INTEREST OR PLEASURE IN DOING THINGS: NOT AT ALL
2. FEELING DOWN, DEPRESSED OR HOPELESS: NOT AT ALL
SUM OF ALL RESPONSES TO PHQ QUESTIONS 1-9: 0
SUM OF ALL RESPONSES TO PHQ QUESTIONS 1-9: 0

## 2024-05-15 NOTE — PROGRESS NOTES
Subjective     HPI    Jeannie Olivas is a 58 y.o. female who comes in with HTN, colorectal cancer, osteoarthritis, adjustment disorder with anxiety and depression, vitamin D deficiency, muscle spasm of left lower extremity and arthralgia of left hip, who presents today c/o thyroid problem, knee pain, and for f/u of these health problems.     Pt doesn't check her BP at home.     Pt reports having constant L knee discomfort starting 1 week ago that is aggravated when she gets up from a seated position. She denies any prior knee sx or falls.     Pt notes feeling some swelling in her thyroid glands recently after working in a ravi environment, but her symptoms have since subsided. She has a hx of goiters.     Pt denies unusual SOB, chest pain, and any recent ER visits or hospitalizations.     Past Medical History:   Diagnosis Date    Adjustment disorder with mixed anxiety and depressed mood     Colorectal cancer (HCC)     2008    DJD (degenerative joint disease) 11/29/2010    Elevated triglycerides with high cholesterol 9/29/2014    Encounter for immunization 10/1/2015    HTN (hypertension), benign 3/14/2016    Primary osteoarthritis involving multiple joints 10/1/2015    Vitamin D deficiency 9/29/2014     Past Surgical History:   Procedure Laterality Date    BIOPSY OF BREAST, INCISIONAL  left-2009-neg.    BREAST BIOPSY Bilateral     benign bx's bilateral    TOTAL COLECTOMY  partial for sigmoid cancer- 9/2008     Current Outpatient Medications on File Prior to Visit   Medication Sig Dispense Refill    lisinopril (PRINIVIL;ZESTRIL) 10 MG tablet TAKE ONE TABLET BY MOUTH ONCE DAILY. 90 tablet 1    FLUoxetine (PROZAC) 20 MG capsule Take 1 capsule by mouth daily 90 capsule 1    omeprazole (PRILOSEC) 20 MG delayed release capsule Take 1 capsule by mouth daily 90 capsule 1    meloxicam (MOBIC) 15 MG tablet Take 1 tablet by mouth daily 90 tablet 1    calcium carbonate (OSCAL) 500 MG TABS tablet Take by mouth daily

## 2024-05-15 NOTE — PROGRESS NOTES
Chief Complaint   Patient presents with    Hypertension    Thyroid Problem     Swollen nodes     Knee Pain     Left knee for a week      \"Have you been to the ER, urgent care clinic since your last visit?  Hospitalized since your last visit?\"    NO    “Have you seen or consulted any other health care providers outside of [facility] since your last visit?”    NO     “Have you had a pap smear?”    NO    Date of last Cervical Cancer screen (HPV or PAP): 9/1/2018             Click Here for Release of Records Request

## 2024-05-16 LAB
25(OH)D3 SERPL-MCNC: 42.1 NG/ML (ref 30–100)
ALBUMIN SERPL-MCNC: 4 G/DL (ref 3.5–5)
ALBUMIN/GLOB SERPL: 1.4 (ref 1.1–2.2)
ALP SERPL-CCNC: 81 U/L (ref 45–117)
ALT SERPL-CCNC: 30 U/L (ref 12–78)
ANION GAP SERPL CALC-SCNC: 4 MMOL/L (ref 5–15)
AST SERPL-CCNC: 17 U/L (ref 15–37)
BILIRUB SERPL-MCNC: 0.4 MG/DL (ref 0.2–1)
BUN SERPL-MCNC: 15 MG/DL (ref 6–20)
BUN/CREAT SERPL: 19 (ref 12–20)
CALCIUM SERPL-MCNC: 9.8 MG/DL (ref 8.5–10.1)
CHLORIDE SERPL-SCNC: 106 MMOL/L (ref 97–108)
CHOLEST SERPL-MCNC: 207 MG/DL
CO2 SERPL-SCNC: 30 MMOL/L (ref 21–32)
CREAT SERPL-MCNC: 0.79 MG/DL (ref 0.55–1.02)
GLOBULIN SER CALC-MCNC: 2.9 G/DL (ref 2–4)
GLUCOSE SERPL-MCNC: 107 MG/DL (ref 65–100)
HDLC SERPL-MCNC: 43 MG/DL
HDLC SERPL: 4.8 (ref 0–5)
LDLC SERPL CALC-MCNC: 111.6 MG/DL (ref 0–100)
POTASSIUM SERPL-SCNC: 4.6 MMOL/L (ref 3.5–5.1)
PROT SERPL-MCNC: 6.9 G/DL (ref 6.4–8.2)
SODIUM SERPL-SCNC: 140 MMOL/L (ref 136–145)
TRIGL SERPL-MCNC: 262 MG/DL
VLDLC SERPL CALC-MCNC: 52.4 MG/DL

## 2024-06-06 DIAGNOSIS — K21.9 GASTROESOPHAGEAL REFLUX DISEASE WITHOUT ESOPHAGITIS: ICD-10-CM

## 2024-06-06 DIAGNOSIS — M15.9 PRIMARY OSTEOARTHRITIS INVOLVING MULTIPLE JOINTS: ICD-10-CM

## 2024-06-06 DIAGNOSIS — F43.23 ADJUSTMENT DISORDER WITH MIXED ANXIETY AND DEPRESSED MOOD: ICD-10-CM

## 2024-06-06 NOTE — TELEPHONE ENCOUNTER
PCP: Anam Salvador MD    Last appt: 5/15/2024   No future appointments.    Requested Prescriptions     Pending Prescriptions Disp Refills    meloxicam (MOBIC) 15 MG tablet [Pharmacy Med Name: MELOXICAM 15 MG TAB>] 30 tablet 0     Sig: TAKE ONE TABLET BY MOUTH ONCE DAILY    omeprazole (PRILOSEC) 20 MG delayed release capsule [Pharmacy Med Name: OMEPRAZOLE DR 20MG CAPS>] 30 capsule 0     Sig: TAKE ONE CAPSULE BY MOUTH ONCE DAILY.    FLUoxetine (PROZAC) 20 MG capsule [Pharmacy Med Name: FLUOXETINE HCL 20 MG CAP>] 30 capsule 0     Sig: TAKE ONE CAPSULE BY MOUTH ONCE DAILY.         Prior labs and Blood pressures:  BP Readings from Last 3 Encounters:   05/15/24 122/78   10/02/23 120/68   05/12/23 127/75     Lab Results   Component Value Date/Time     05/15/2024 09:36 AM    K 4.6 05/15/2024 09:36 AM     05/15/2024 09:36 AM    CO2 30 05/15/2024 09:36 AM    BUN 15 05/15/2024 09:36 AM    GFRAA >60 01/20/2022 09:32 AM     No results found for: \"HBA1C\", \"[id number]\"  Lab Results   Component Value Date/Time    CHOL 207 05/15/2024 09:36 AM    HDL 43 05/15/2024 09:36 AM    .6 05/15/2024 09:36 AM    .2 10/02/2023 09:55 AM    VLDL 52.4 05/15/2024 09:36 AM     No results found for: \"VITD3\"    No results found for: \"TSH\", \"TSH2\", \"TSH3\"

## 2024-06-07 RX ORDER — FLUOXETINE HYDROCHLORIDE 20 MG/1
20 CAPSULE ORAL DAILY
Qty: 30 CAPSULE | Refills: 0 | Status: SHIPPED | OUTPATIENT
Start: 2024-06-07

## 2024-06-07 RX ORDER — OMEPRAZOLE 20 MG/1
20 CAPSULE, DELAYED RELEASE ORAL DAILY
Qty: 30 CAPSULE | Refills: 0 | Status: SHIPPED | OUTPATIENT
Start: 2024-06-07

## 2024-06-07 RX ORDER — MELOXICAM 15 MG/1
15 TABLET ORAL DAILY
Qty: 30 TABLET | Refills: 0 | Status: SHIPPED | OUTPATIENT
Start: 2024-06-07

## 2024-07-11 DIAGNOSIS — F43.23 ADJUSTMENT DISORDER WITH MIXED ANXIETY AND DEPRESSED MOOD: ICD-10-CM

## 2024-07-11 DIAGNOSIS — M15.9 PRIMARY OSTEOARTHRITIS INVOLVING MULTIPLE JOINTS: ICD-10-CM

## 2024-07-11 DIAGNOSIS — K21.9 GASTROESOPHAGEAL REFLUX DISEASE WITHOUT ESOPHAGITIS: ICD-10-CM

## 2024-07-12 NOTE — TELEPHONE ENCOUNTER
PCP: Anam Salvador MD    Last appt: 5/15/2024   No future appointments.    Requested Prescriptions     Pending Prescriptions Disp Refills    FLUoxetine (PROZAC) 20 MG capsule [Pharmacy Med Name: FLUOXETINE HCL 20 MG CAP>] 30 capsule 0     Sig: TAKE ONE CAPSULE BY MOUTH ONCE DAILY.    omeprazole (PRILOSEC) 20 MG delayed release capsule [Pharmacy Med Name: OMEPRAZOLE DR 20MG CAPS>] 30 capsule 0     Sig: TAKE ONE CAPSULE BY MOUTH ONCE DAILY.    meloxicam (MOBIC) 15 MG tablet [Pharmacy Med Name: MELOXICAM 15 MG TAB>] 30 tablet 0     Sig: TAKE ONE TABLET BY MOUTH ONCE DAILY         Prior labs and Blood pressures:  BP Readings from Last 3 Encounters:   05/15/24 122/78   10/02/23 120/68   05/12/23 127/75     Lab Results   Component Value Date/Time     05/15/2024 09:36 AM    K 4.6 05/15/2024 09:36 AM     05/15/2024 09:36 AM    CO2 30 05/15/2024 09:36 AM    BUN 15 05/15/2024 09:36 AM    GFRAA >60 01/20/2022 09:32 AM     No results found for: \"HBA1C\", \"[id number]\"  Lab Results   Component Value Date/Time    CHOL 207 05/15/2024 09:36 AM    HDL 43 05/15/2024 09:36 AM    .6 05/15/2024 09:36 AM    .2 10/02/2023 09:55 AM    VLDL 52.4 05/15/2024 09:36 AM     No results found for: \"VITD3\"    No results found for: \"TSH\", \"TSH2\", \"TSH3\"

## 2024-07-15 RX ORDER — FLUOXETINE HYDROCHLORIDE 20 MG/1
20 CAPSULE ORAL DAILY
Qty: 30 CAPSULE | Refills: 5 | Status: SHIPPED | OUTPATIENT
Start: 2024-07-15

## 2024-07-15 RX ORDER — OMEPRAZOLE 20 MG/1
20 CAPSULE, DELAYED RELEASE ORAL DAILY
Qty: 30 CAPSULE | Refills: 5 | Status: SHIPPED | OUTPATIENT
Start: 2024-07-15

## 2024-07-15 RX ORDER — MELOXICAM 15 MG/1
15 TABLET ORAL DAILY
Qty: 30 TABLET | Refills: 5 | Status: SHIPPED | OUTPATIENT
Start: 2024-07-15

## 2024-11-14 DIAGNOSIS — I10 HTN (HYPERTENSION), BENIGN: ICD-10-CM

## 2024-11-15 NOTE — TELEPHONE ENCOUNTER
Last appointment: 5/15/24 Salvador  Next appointment: None- Needs new provider- Please contact patient to schedule  Previous refill encounter(s): 5/8/24 90 + 1    Requested Prescriptions     Pending Prescriptions Disp Refills    lisinopril (PRINIVIL;ZESTRIL) 10 MG tablet [Pharmacy Med Name: LISINOPRIL 10 MG TAB>] 90 tablet 1     Sig: Take 1 tablet by mouth daily Contact office to schedule appointment with new provider for refill.     For Pharmacy Admin Tracking Only    Program: Medication Refill  CPA in place:    Recommendation Provided To:   Intervention Detail: New Rx: 1, reason: Patient Preference  Intervention Accepted By:   Gap Closed?:    Time Spent (min): 5

## 2024-11-17 RX ORDER — LISINOPRIL 10 MG/1
10 TABLET ORAL DAILY
Qty: 90 TABLET | Refills: 2 | Status: SHIPPED | OUTPATIENT
Start: 2024-11-17

## 2025-01-12 DIAGNOSIS — K21.9 GASTROESOPHAGEAL REFLUX DISEASE WITHOUT ESOPHAGITIS: ICD-10-CM

## 2025-01-12 DIAGNOSIS — M15.0 PRIMARY OSTEOARTHRITIS INVOLVING MULTIPLE JOINTS: ICD-10-CM

## 2025-01-12 DIAGNOSIS — F43.23 ADJUSTMENT DISORDER WITH MIXED ANXIETY AND DEPRESSED MOOD: ICD-10-CM

## 2025-01-13 RX ORDER — OMEPRAZOLE 20 MG/1
20 CAPSULE, DELAYED RELEASE ORAL DAILY
Qty: 30 CAPSULE | Refills: 5 | OUTPATIENT
Start: 2025-01-13

## 2025-01-13 RX ORDER — MELOXICAM 15 MG/1
15 TABLET ORAL DAILY
Qty: 30 TABLET | Refills: 5 | OUTPATIENT
Start: 2025-01-13

## 2025-01-22 DIAGNOSIS — K21.9 GASTROESOPHAGEAL REFLUX DISEASE WITHOUT ESOPHAGITIS: ICD-10-CM

## 2025-01-22 DIAGNOSIS — M15.0 PRIMARY OSTEOARTHRITIS INVOLVING MULTIPLE JOINTS: ICD-10-CM

## 2025-01-22 DIAGNOSIS — F43.23 ADJUSTMENT DISORDER WITH MIXED ANXIETY AND DEPRESSED MOOD: ICD-10-CM

## 2025-01-22 RX ORDER — MELOXICAM 15 MG/1
15 TABLET ORAL DAILY
Qty: 90 TABLET | Refills: 0 | Status: SHIPPED | OUTPATIENT
Start: 2025-01-22

## 2025-01-22 NOTE — TELEPHONE ENCOUNTER
Last appointment: 5/15/24 Salvador  Next appointment: 3/19/25 Clark  Previous refill encounter(s): 7/15/24 30 + 5 (all 3)    Requested Prescriptions     Pending Prescriptions Disp Refills    FLUoxetine (PROZAC) 20 MG capsule 30 capsule 1     Sig: Take 1 capsule by mouth daily    meloxicam (MOBIC) 15 MG tablet 30 tablet 1     Sig: Take 1 tablet by mouth daily    omeprazole (PRILOSEC) 20 MG delayed release capsule 30 capsule 1     Sig: Take 1 capsule by mouth daily     For Pharmacy Admin Tracking Only    Program: Medication Refill  CPA in place:    Recommendation Provided To:   Intervention Detail: New Rx: 3, reason: Patient Preference  Intervention Accepted By:   Gap Closed?:    Time Spent (min): 5

## 2025-01-24 ENCOUNTER — HOSPITAL ENCOUNTER (OUTPATIENT)
Facility: HOSPITAL | Age: 60
Discharge: HOME | End: 2025-01-27
Payer: COMMERCIAL

## 2025-01-24 VITALS — WEIGHT: 293 LBS | BODY MASS INDEX: 47.09 KG/M2 | HEIGHT: 66 IN

## 2025-01-24 DIAGNOSIS — Z12.31 VISIT FOR SCREENING MAMMOGRAM: ICD-10-CM

## 2025-01-24 PROCEDURE — 77063 BREAST TOMOSYNTHESIS BI: CPT

## 2025-02-17 DIAGNOSIS — K21.9 GASTROESOPHAGEAL REFLUX DISEASE WITHOUT ESOPHAGITIS: ICD-10-CM

## 2025-02-17 NOTE — TELEPHONE ENCOUNTER
PCP: No, Pcp    Last appt: 5/15/2024     Future Appointments   Date Time Provider Department Center   3/21/2025  8:00 AM Manas Clark APRN - CNP [facility] DE[facility]       Requested Prescriptions     Pending Prescriptions Disp Refills    omeprazole (PRILOSEC) 20 MG delayed release capsule [Pharmacy Med Name: OMEPRAZOLE DR 20MG CAPS>] 30 capsule 0     Sig: TAKE ONE CAPSULE BY MOUTH ONCE DAILY.         Prior labs and Blood pressures:  BP Readings from Last 3 Encounters:   05/15/24 122/78   10/02/23 120/68   05/12/23 127/75     Lab Results   Component Value Date/Time     05/15/2024 09:36 AM    K 4.6 05/15/2024 09:36 AM     05/15/2024 09:36 AM    CO2 30 05/15/2024 09:36 AM    BUN 15 05/15/2024 09:36 AM    GFRAA >60 01/20/2022 09:32 AM     Lab Results   Component Value Date/Time    CHOL 207 05/15/2024 09:36 AM    HDL 43 05/15/2024 09:36 AM    .6 05/15/2024 09:36 AM    .2 10/02/2023 09:55 AM    VLDL 52.4 05/15/2024 09:36 AM

## 2025-02-18 RX ORDER — OMEPRAZOLE 20 MG/1
20 CAPSULE, DELAYED RELEASE ORAL DAILY
Qty: 30 CAPSULE | Refills: 0 | Status: SHIPPED | OUTPATIENT
Start: 2025-02-18

## 2025-03-17 DIAGNOSIS — K21.9 GASTROESOPHAGEAL REFLUX DISEASE WITHOUT ESOPHAGITIS: ICD-10-CM

## 2025-03-17 RX ORDER — OMEPRAZOLE 20 MG/1
20 CAPSULE, DELAYED RELEASE ORAL DAILY
Qty: 30 CAPSULE | Refills: 0 | OUTPATIENT
Start: 2025-03-17

## 2025-03-19 DIAGNOSIS — K21.9 GASTROESOPHAGEAL REFLUX DISEASE WITHOUT ESOPHAGITIS: ICD-10-CM

## 2025-03-19 NOTE — TELEPHONE ENCOUNTER
PCP: No, Pcp    Last appt: 5/15/2024   Future Appointments   Date Time Provider Department Center   3/21/2025  8:00 AM Manas Clark APRN - CNP [facility] DE[facility]       Requested Prescriptions     Pending Prescriptions Disp Refills    omeprazole (PRILOSEC) 20 MG delayed release capsule [Pharmacy Med Name: OMEPRAZOLE DR 20MG CAPS>] 30 capsule 0     Sig: TAKE ONE CAPSULE BY MOUTH ONCE DAILY.

## 2025-03-20 RX ORDER — OMEPRAZOLE 20 MG/1
20 CAPSULE, DELAYED RELEASE ORAL DAILY
Qty: 30 CAPSULE | Refills: 0 | OUTPATIENT
Start: 2025-03-20

## 2025-03-21 ENCOUNTER — OFFICE VISIT (OUTPATIENT)
Age: 60
End: 2025-03-21
Payer: COMMERCIAL

## 2025-03-21 ENCOUNTER — TELEPHONE (OUTPATIENT)
Age: 60
End: 2025-03-21

## 2025-03-21 VITALS
RESPIRATION RATE: 16 BRPM | SYSTOLIC BLOOD PRESSURE: 112 MMHG | WEIGHT: 248.8 LBS | OXYGEN SATURATION: 98 % | HEIGHT: 66 IN | BODY MASS INDEX: 39.98 KG/M2 | HEART RATE: 68 BPM | DIASTOLIC BLOOD PRESSURE: 69 MMHG | TEMPERATURE: 96.8 F

## 2025-03-21 DIAGNOSIS — I10 HTN (HYPERTENSION), BENIGN: ICD-10-CM

## 2025-03-21 DIAGNOSIS — E78.5 DYSLIPIDEMIA, GOAL LDL BELOW 130: ICD-10-CM

## 2025-03-21 DIAGNOSIS — F43.23 ADJUSTMENT DISORDER WITH MIXED ANXIETY AND DEPRESSED MOOD: ICD-10-CM

## 2025-03-21 DIAGNOSIS — M15.0 PRIMARY OSTEOARTHRITIS INVOLVING MULTIPLE JOINTS: ICD-10-CM

## 2025-03-21 DIAGNOSIS — Z51.81 THERAPEUTIC DRUG MONITORING: ICD-10-CM

## 2025-03-21 DIAGNOSIS — Z23 NEED FOR PROPHYLACTIC VACCINATION AGAINST STREPTOCOCCUS PNEUMONIAE (PNEUMOCOCCUS): ICD-10-CM

## 2025-03-21 DIAGNOSIS — Z92.21 HISTORY OF CANCER CHEMOTHERAPY: ICD-10-CM

## 2025-03-21 DIAGNOSIS — K21.9 GASTROESOPHAGEAL REFLUX DISEASE WITHOUT ESOPHAGITIS: ICD-10-CM

## 2025-03-21 DIAGNOSIS — R73.9 HYPERGLYCEMIA: ICD-10-CM

## 2025-03-21 DIAGNOSIS — Z78.0 ASYMPTOMATIC POSTMENOPAUSAL STATE: ICD-10-CM

## 2025-03-21 DIAGNOSIS — Z85.048 HISTORY OF MALIGNANT NEOPLASM OF COLORECTAL REGION: ICD-10-CM

## 2025-03-21 DIAGNOSIS — K42.9 UMBILICAL HERNIA WITHOUT OBSTRUCTION AND WITHOUT GANGRENE: Primary | ICD-10-CM

## 2025-03-21 LAB
ALBUMIN SERPL-MCNC: 4.1 G/DL (ref 3.5–5)
ALBUMIN/GLOB SERPL: 1.3 (ref 1.1–2.2)
ALP SERPL-CCNC: 80 U/L (ref 45–117)
ALT SERPL-CCNC: 26 U/L (ref 12–78)
ANION GAP SERPL CALC-SCNC: 4 MMOL/L (ref 2–12)
AST SERPL-CCNC: 18 U/L (ref 15–37)
BASOPHILS # BLD: 0.03 K/UL (ref 0–0.1)
BASOPHILS NFR BLD: 0.5 % (ref 0–1)
BILIRUB SERPL-MCNC: 0.5 MG/DL (ref 0.2–1)
BUN SERPL-MCNC: 16 MG/DL (ref 6–20)
BUN/CREAT SERPL: 23 (ref 12–20)
CALCIUM SERPL-MCNC: 10.3 MG/DL (ref 8.5–10.1)
CHLORIDE SERPL-SCNC: 105 MMOL/L (ref 97–108)
CHOLEST SERPL-MCNC: 208 MG/DL
CO2 SERPL-SCNC: 30 MMOL/L (ref 21–32)
COMMENT:: NORMAL
CREAT SERPL-MCNC: 0.7 MG/DL (ref 0.55–1.02)
DIFFERENTIAL METHOD BLD: NORMAL
EOSINOPHIL # BLD: 0.07 K/UL (ref 0–0.4)
EOSINOPHIL NFR BLD: 1.2 % (ref 0–7)
ERYTHROCYTE [DISTWIDTH] IN BLOOD BY AUTOMATED COUNT: 13.9 % (ref 11.5–14.5)
EST. AVERAGE GLUCOSE BLD GHB EST-MCNC: 97 MG/DL
GLOBULIN SER CALC-MCNC: 3.1 G/DL (ref 2–4)
GLUCOSE SERPL-MCNC: 100 MG/DL (ref 65–100)
HBA1C MFR BLD: 5 % (ref 4–5.6)
HCT VFR BLD AUTO: 40.1 % (ref 35–47)
HDLC SERPL-MCNC: 39 MG/DL
HDLC SERPL: 5.3 (ref 0–5)
HGB BLD-MCNC: 13.2 G/DL (ref 11.5–16)
IMM GRANULOCYTES # BLD AUTO: 0 K/UL (ref 0–0.04)
IMM GRANULOCYTES NFR BLD AUTO: 0 % (ref 0–0.5)
LDLC SERPL CALC-MCNC: 107 MG/DL (ref 0–100)
LYMPHOCYTES # BLD: 2.05 K/UL (ref 0.8–3.5)
LYMPHOCYTES NFR BLD: 36.5 % (ref 12–49)
MAGNESIUM SERPL-MCNC: 2.5 MG/DL (ref 1.6–2.4)
MCH RBC QN AUTO: 29.2 PG (ref 26–34)
MCHC RBC AUTO-ENTMCNC: 32.9 G/DL (ref 30–36.5)
MCV RBC AUTO: 88.7 FL (ref 80–99)
MONOCYTES # BLD: 0.4 K/UL (ref 0–1)
MONOCYTES NFR BLD: 7.1 % (ref 5–13)
NEUTS SEG # BLD: 3.06 K/UL (ref 1.8–8)
NEUTS SEG NFR BLD: 54.7 % (ref 32–75)
NRBC # BLD: 0 K/UL (ref 0–0.01)
NRBC BLD-RTO: 0 PER 100 WBC
PLATELET # BLD AUTO: 346 K/UL (ref 150–400)
PMV BLD AUTO: 9.5 FL (ref 8.9–12.9)
POTASSIUM SERPL-SCNC: 4.5 MMOL/L (ref 3.5–5.1)
PROT SERPL-MCNC: 7.2 G/DL (ref 6.4–8.2)
RBC # BLD AUTO: 4.52 M/UL (ref 3.8–5.2)
SODIUM SERPL-SCNC: 139 MMOL/L (ref 136–145)
SPECIMEN HOLD: NORMAL
TRIGL SERPL-MCNC: 310 MG/DL
VIT B12 SERPL-MCNC: 601 PG/ML (ref 193–986)
VLDLC SERPL CALC-MCNC: 62 MG/DL
WBC # BLD AUTO: 5.6 K/UL (ref 3.6–11)

## 2025-03-21 PROCEDURE — 90677 PCV20 VACCINE IM: CPT | Performed by: NURSE PRACTITIONER

## 2025-03-21 PROCEDURE — 3074F SYST BP LT 130 MM HG: CPT | Performed by: NURSE PRACTITIONER

## 2025-03-21 PROCEDURE — 90471 IMMUNIZATION ADMIN: CPT | Performed by: NURSE PRACTITIONER

## 2025-03-21 PROCEDURE — 3078F DIAST BP <80 MM HG: CPT | Performed by: NURSE PRACTITIONER

## 2025-03-21 PROCEDURE — 99214 OFFICE O/P EST MOD 30 MIN: CPT | Performed by: NURSE PRACTITIONER

## 2025-03-21 RX ORDER — MELOXICAM 15 MG/1
15 TABLET ORAL DAILY
Qty: 90 TABLET | Refills: 3 | Status: SHIPPED | OUTPATIENT
Start: 2025-03-21

## 2025-03-21 RX ORDER — OMEPRAZOLE 20 MG/1
20 CAPSULE, DELAYED RELEASE ORAL DAILY
Qty: 90 CAPSULE | Refills: 3 | Status: SHIPPED | OUTPATIENT
Start: 2025-03-21

## 2025-03-21 RX ORDER — LISINOPRIL 10 MG/1
10 TABLET ORAL DAILY
Qty: 90 TABLET | Refills: 2 | Status: SHIPPED | OUTPATIENT
Start: 2025-03-21

## 2025-03-21 SDOH — ECONOMIC STABILITY: FOOD INSECURITY: WITHIN THE PAST 12 MONTHS, THE FOOD YOU BOUGHT JUST DIDN'T LAST AND YOU DIDN'T HAVE MONEY TO GET MORE.: NEVER TRUE

## 2025-03-21 SDOH — HEALTH STABILITY: PHYSICAL HEALTH: ON AVERAGE, HOW MANY MINUTES DO YOU ENGAGE IN EXERCISE AT THIS LEVEL?: 20 MIN

## 2025-03-21 SDOH — ECONOMIC STABILITY: FOOD INSECURITY: WITHIN THE PAST 12 MONTHS, YOU WORRIED THAT YOUR FOOD WOULD RUN OUT BEFORE YOU GOT MONEY TO BUY MORE.: NEVER TRUE

## 2025-03-21 SDOH — HEALTH STABILITY: PHYSICAL HEALTH: ON AVERAGE, HOW MANY DAYS PER WEEK DO YOU ENGAGE IN MODERATE TO STRENUOUS EXERCISE (LIKE A BRISK WALK)?: 3 DAYS

## 2025-03-21 ASSESSMENT — PATIENT HEALTH QUESTIONNAIRE - PHQ9
2. FEELING DOWN, DEPRESSED OR HOPELESS: NOT AT ALL
1. LITTLE INTEREST OR PLEASURE IN DOING THINGS: NOT AT ALL
SUM OF ALL RESPONSES TO PHQ QUESTIONS 1-9: 0

## 2025-03-21 ASSESSMENT — ENCOUNTER SYMPTOMS
TROUBLE SWALLOWING: 0
ABDOMINAL PAIN: 0

## 2025-03-21 NOTE — PROGRESS NOTES
Chief Complaint   Patient presents with    Establish Care     Previous PCP: Dr. Anam Salvador - notes in chart    Hypertension    Cholesterol Problem     \"Have you been to the ER, urgent care clinic since your last visit?  Hospitalized since your last visit?\"    NO    “Have you seen or consulted any other health care providers outside of [facility] since your last visit?”    [facility]         “Have you had a pap smear?”    NO    Date of last Cervical Cancer screen (HPV or PAP): 9/1/2018                 3/21/2025     8:16 AM   PHQ-9    Little interest or pleasure in doing things 0   Feeling down, depressed, or hopeless 0   PHQ-2 Score 0   PHQ-9 Total Score 0           Financial Resource Strain: Low Risk  (10/2/2023)    Overall Financial Resource Strain (CARDIA)     Difficulty of Paying Living Expenses: Not hard at all      Food Insecurity: No Food Insecurity (3/21/2025)    Hunger Vital Sign     Worried About Running Out of Food in the Last Year: Never true     Ran Out of Food in the Last Year: Never true          Health Maintenance Due   Topic Date Due    HIV screen  Never done    Diabetes screen  Never done    Shingles vaccine (1 of 2) Never done    Pneumococcal 50+ years Vaccine (1 of 1 - PCV) Never done    DEXA (modify frequency per FRAX score)  10/14/2021    Cervical cancer screen  09/01/2023    Flu vaccine (1) 08/01/2024    COVID-19 Vaccine (4 - 2024-25 season) 09/01/2024    Respiratory Syncytial Virus (RSV) Pregnant or age 60 yrs+ (1 - Risk 60-74 years 1-dose series) Never done      
  Well controlled, continue omeprazole.  - omeprazole (PRILOSEC) 20 MG delayed release capsule; Take 1 capsule by mouth daily, Disp-90 capsule, R-3Normal  -     Vitamin B12; Future  -     Magnesium; Future  3. Adjustment disorder with mixed anxiety and depressed mood  -     Well controlled, continue fluoxetine.  - FLUoxetine (PROZAC) 20 MG capsule; Take 1 capsule by mouth daily, Disp-90 capsule, R-3Normal  4. Primary osteoarthritis involving multiple joints  -     Congratulated on weight loss, will consider low dose radiation therapy as an alternative treatment option, continue meloxicam, will monitor renal function.  - meloxicam (MOBIC) 15 MG tablet; Take 1 tablet by mouth daily, Disp-90 tablet, R-3Normal  -     CBC with Auto Differential; Future  5. HTN (hypertension), benign  -     Well controlled, continue lisinopril.  - lisinopril (PRINIVIL;ZESTRIL) 10 MG tablet; Take 1 tablet by mouth daily Contact office to schedule appointment with new provider for refill., Disp-90 tablet, R-2Normal  6. History of malignant neoplasm of colorectal region  -     [facility] - Sergio Melchor MD, Gastroenterology, [place] ([address])  7. Therapeutic drug monitoring  -     Vitamin B12; Future  -     Magnesium; Future  8. Hyperglycemia  -     Comprehensive Metabolic Panel; Future  -     Hemoglobin A1C; Future  9. Dyslipidemia, goal LDL below 130  -     Lipid Panel; Future  10. History of cancer chemotherapy  -     DEXA BONE DENSITY AXIAL SKELETON; Future  11. Asymptomatic postmenopausal state  -     DEXA BONE DENSITY AXIAL SKELETON; Future  12. Need for prophylactic vaccination against Streptococcus pneumoniae (pneumococcus)  -     Pneumococcal, PCV20, PREVNAR 20, (age 18 yrs+), IM, PF     Assessment & Plan  1. Umbilical hernia: Stable.   - Referral to general surgery for further evaluation and management.    2. Arthritis: Stable.  - Continue meloxicam for arthritis pain.  - Discussed low-dose radiation therapy as a potential

## 2025-03-25 ENCOUNTER — RESULTS FOLLOW-UP (OUTPATIENT)
Age: 60
End: 2025-03-25

## 2025-03-25 DIAGNOSIS — E83.52 HYPERCALCEMIA: Primary | ICD-10-CM

## 2025-03-25 NOTE — RESULT ENCOUNTER NOTE
Hi Ms. Olivas, I hope you are doing well, I have your labs:    - Magnesium is upper limits normal, no concerns.    - Cholesterol is a bit elevated, At this point in time, I would focus on eating healthy which includes avoiding trans fats, saturated fats, and added sugars. Healthy fats are good, this includes nuts, avocados, fatty fish (I.e. salmon). Exercise has a great impact on cholesterol levels as well.    - Electrolytes, kidney function, and liver tests are overall normal. Calcium is just slightly above normal, I'll place an order to repeat in a month or so.    - A1c, which is your blood sugar averages over 3 months, is normal.    - Blood count is normal.    - B12 is well within a normal range, which is good.

## 2025-03-31 ENCOUNTER — OFFICE VISIT (OUTPATIENT)
Age: 60
End: 2025-03-31
Payer: COMMERCIAL

## 2025-03-31 VITALS
TEMPERATURE: 98.4 F | HEART RATE: 68 BPM | DIASTOLIC BLOOD PRESSURE: 77 MMHG | OXYGEN SATURATION: 98 % | WEIGHT: 254 LBS | RESPIRATION RATE: 16 BRPM | HEIGHT: 66 IN | BODY MASS INDEX: 40.82 KG/M2 | SYSTOLIC BLOOD PRESSURE: 123 MMHG

## 2025-03-31 DIAGNOSIS — K43.2 INCISIONAL HERNIA, WITHOUT OBSTRUCTION OR GANGRENE: Primary | ICD-10-CM

## 2025-03-31 PROBLEM — E66.01 MORBID OBESITY: Status: ACTIVE | Noted: 2017-06-16

## 2025-03-31 PROCEDURE — 3078F DIAST BP <80 MM HG: CPT | Performed by: SURGERY

## 2025-03-31 PROCEDURE — 3074F SYST BP LT 130 MM HG: CPT | Performed by: SURGERY

## 2025-03-31 PROCEDURE — 99203 OFFICE O/P NEW LOW 30 MIN: CPT | Performed by: SURGERY

## 2025-03-31 NOTE — PROGRESS NOTES
Identified patient with two patient identifiers (name and ). Reviewed chart in preparation for visit and have obtained necessary documentation.    Jeannie Olivas is a 59 y.o. female  Chief Complaint   Patient presents with    New Patient    Hernia     umbilical     /77 (BP Site: Left Upper Arm, Patient Position: Sitting, BP Cuff Size: Large Adult)   Pulse 68   Temp 98.4 °F (36.9 °C) (Oral)   Resp 16   Ht 1.676 m (5' 6\")   Wt 115.2 kg (254 lb)   LMP 2008   SpO2 98%   BMI 41.00 kg/m²     1. Have you been to the ER, urgent care clinic since your last visit?  Hospitalized since your last visit?new patient    2. Have you seen or consulted any other health care providers outside of the [facility] since your last visit?  Include any pap smears or colon screening. New patient

## 2025-04-01 ENCOUNTER — TELEPHONE (OUTPATIENT)
Age: 60
End: 2025-04-01

## 2025-04-01 NOTE — TELEPHONE ENCOUNTER
Contacted patient to schedule surgery with Dr. Renteria and patient stated once she gets her CT scan done, she will call to schedule.

## 2025-04-01 NOTE — PROGRESS NOTES
Subjective:       Jeannie Olivas is a 59 y.o. female who was [facility] for evaluation of recurrent ventral hernia.  Patient has a history of hand assist laparoscopic sigmoid resection for treatment of colon cancer; during this procedure, a previously identified umbilical hernia was repaired during closure of the periumbilical extraction wound.  Patient has symptoms of bulging, mass, which are made worse with coughing, lifting, standing. Symptoms were first noted a few years ago. Symptoms did not start at work. Pain is sharp, intermittent. Lump is not reducible. The patient has no symptoms of chronic constipation, chronic cough, difficulty urinating. Patient has lost approximately 50 pounds through behavior modification, Medifast diet.  No nausea, vomiting, or dysuria.  No bloody bowel movements; no evidence of recurrent colon cancer.     Past Medical History:   Diagnosis Date    Adjustment disorder with mixed anxiety and depressed mood     Colorectal cancer (HCC)     2008    DJD (degenerative joint disease) 11/29/2010    Elevated triglycerides with high cholesterol 9/29/2014    Encounter for immunization 10/1/2015    HTN (hypertension), benign 3/14/2016    Primary osteoarthritis involving multiple joints 10/1/2015    Vitamin D deficiency 9/29/2014     Patient Active Problem List    Diagnosis Date Noted    Incisional hernia, without obstruction or gangrene 03/31/2025    Muscle spasm of left lower extremity 06/20/2021    Colorectal cancer (HCC)     Morbid obesity 06/16/2017    Arthralgia of left hip 03/14/2016    HTN (hypertension), benign 03/14/2016    Primary osteoarthritis involving multiple joints 10/01/2015    Dyslipidemia, goal LDL below 130 09/29/2014    Vitamin D deficiency 09/29/2014    Adjustment disorder with mixed anxiety and depressed mood      Past Surgical History:   Procedure Laterality Date    BIOPSY OF BREAST, INCISIONAL  left-2009-neg.    BREAST BIOPSY Bilateral     benign

## 2025-04-11 ENCOUNTER — HOSPITAL ENCOUNTER (OUTPATIENT)
Facility: HOSPITAL | Age: 60
Discharge: HOME | End: 2025-04-14
Attending: SURGERY
Payer: COMMERCIAL

## 2025-04-11 DIAGNOSIS — K43.2 INCISIONAL HERNIA, WITHOUT OBSTRUCTION OR GANGRENE: ICD-10-CM

## 2025-04-11 PROCEDURE — 74177 CT ABD & PELVIS W/CONTRAST: CPT

## 2025-04-11 PROCEDURE — 6360000004 HC RX CONTRAST MEDICATION: Performed by: SURGERY

## 2025-04-11 PROCEDURE — 2500000003 HC RX 250 WO HCPCS: Performed by: SURGERY

## 2025-04-11 RX ORDER — IOPAMIDOL 755 MG/ML
100 INJECTION, SOLUTION INTRAVASCULAR
Status: COMPLETED | OUTPATIENT
Start: 2025-04-11 | End: 2025-04-11

## 2025-04-11 RX ADMIN — IOPAMIDOL 100 ML: 755 INJECTION, SOLUTION INTRAVENOUS at 08:53

## 2025-04-11 RX ADMIN — BARIUM SULFATE 900 ML: 20 SUSPENSION ORAL at 08:53

## 2025-04-14 ENCOUNTER — PREP FOR PROCEDURE (OUTPATIENT)
Age: 60
End: 2025-04-14

## 2025-04-14 ENCOUNTER — TELEPHONE (OUTPATIENT)
Age: 60
End: 2025-04-14

## 2025-04-14 DIAGNOSIS — K43.9 VENTRAL HERNIA WITHOUT OBSTRUCTION OR GANGRENE: ICD-10-CM

## 2025-04-14 RX ORDER — BUPIVACAINE HYDROCHLORIDE 5 MG/ML
30 INJECTION, SOLUTION PERINEURAL ONCE
Status: CANCELLED | OUTPATIENT
Start: 2025-04-14 | End: 2025-04-14

## 2025-04-14 NOTE — TELEPHONE ENCOUNTER
Contacted patient regarding scheduling surgery with Dr. Renteria. Offered the date of 04/23 and patient wanted something on a Friday. Informed her the next available will be 05/02. Patient wanted the date of 05/16. Informed patient that PAT will be reaching out as well as a surgical letter will be sent out, patient understood.

## 2025-05-05 ENCOUNTER — HOSPITAL ENCOUNTER (OUTPATIENT)
Age: 60
Discharge: HOME | End: 2025-05-08
Payer: COMMERCIAL

## 2025-05-05 ENCOUNTER — HOSPITAL ENCOUNTER (OUTPATIENT)
Facility: HOSPITAL | Age: 60
Discharge: HOME | End: 2025-05-08
Payer: COMMERCIAL

## 2025-05-05 VITALS
HEART RATE: 60 BPM | TEMPERATURE: 97.8 F | SYSTOLIC BLOOD PRESSURE: 128 MMHG | DIASTOLIC BLOOD PRESSURE: 74 MMHG | RESPIRATION RATE: 18 BRPM | HEIGHT: 66 IN | WEIGHT: 245.8 LBS | BODY MASS INDEX: 39.5 KG/M2

## 2025-05-05 LAB
ALBUMIN SERPL-MCNC: 3.9 G/DL (ref 3.5–5)
ALBUMIN/GLOB SERPL: 1.4 (ref 1.1–2.2)
ALP SERPL-CCNC: 76 U/L (ref 45–117)
ALT SERPL-CCNC: 31 U/L (ref 12–78)
ANION GAP SERPL CALC-SCNC: 0 MMOL/L (ref 2–12)
APPEARANCE UR: CLEAR
AST SERPL-CCNC: 24 U/L (ref 15–37)
BACTERIA URNS QL MICRO: NEGATIVE /HPF
BASOPHILS # BLD: 0.03 K/UL (ref 0–0.1)
BASOPHILS NFR BLD: 0.6 % (ref 0–1)
BILIRUB SERPL-MCNC: 0.4 MG/DL (ref 0.2–1)
BILIRUB UR QL: NEGATIVE
BUN SERPL-MCNC: 19 MG/DL (ref 6–20)
BUN/CREAT SERPL: 26 (ref 12–20)
CALCIUM SERPL-MCNC: 9.7 MG/DL (ref 8.5–10.1)
CHLORIDE SERPL-SCNC: 107 MMOL/L (ref 97–108)
CO2 SERPL-SCNC: 31 MMOL/L (ref 21–32)
COLOR UR: NORMAL
CREAT SERPL-MCNC: 0.74 MG/DL (ref 0.55–1.02)
DIFFERENTIAL METHOD BLD: ABNORMAL
EKG ATRIAL RATE: 56 BPM
EKG DIAGNOSIS: NORMAL
EKG P AXIS: 30 DEGREES
EKG P-R INTERVAL: 176 MS
EKG Q-T INTERVAL: 394 MS
EKG QRS DURATION: 90 MS
EKG QTC CALCULATION (BAZETT): 380 MS
EKG R AXIS: 14 DEGREES
EKG T AXIS: 35 DEGREES
EKG VENTRICULAR RATE: 56 BPM
EOSINOPHIL # BLD: 0.06 K/UL (ref 0–0.4)
EOSINOPHIL NFR BLD: 1.3 % (ref 0–7)
EPITH CASTS URNS QL MICRO: NORMAL /LPF
ERYTHROCYTE [DISTWIDTH] IN BLOOD BY AUTOMATED COUNT: 14.3 % (ref 11.5–14.5)
GLOBULIN SER CALC-MCNC: 2.7 G/DL (ref 2–4)
GLUCOSE SERPL-MCNC: 94 MG/DL (ref 65–100)
GLUCOSE UR STRIP.AUTO-MCNC: NEGATIVE MG/DL
HCT VFR BLD AUTO: 38.4 % (ref 35–47)
HGB BLD-MCNC: 12.3 G/DL (ref 11.5–16)
HGB UR QL STRIP: NEGATIVE
HYALINE CASTS URNS QL MICRO: NORMAL /LPF (ref 0–5)
IMM GRANULOCYTES # BLD AUTO: 0.01 K/UL (ref 0–0.04)
IMM GRANULOCYTES NFR BLD AUTO: 0.2 % (ref 0–0.5)
KETONES UR QL STRIP.AUTO: NEGATIVE MG/DL
LEUKOCYTE ESTERASE UR QL STRIP.AUTO: NEGATIVE
LYMPHOCYTES # BLD: 2.27 K/UL (ref 0.8–3.5)
LYMPHOCYTES NFR BLD: 49.1 % (ref 12–49)
MCH RBC QN AUTO: 28.7 PG (ref 26–34)
MCHC RBC AUTO-ENTMCNC: 32 G/DL (ref 30–36.5)
MCV RBC AUTO: 89.7 FL (ref 80–99)
MONOCYTES # BLD: 0.32 K/UL (ref 0–1)
MONOCYTES NFR BLD: 6.9 % (ref 5–13)
NEUTS SEG # BLD: 1.93 K/UL (ref 1.8–8)
NEUTS SEG NFR BLD: 41.9 % (ref 32–75)
NITRITE UR QL STRIP.AUTO: NEGATIVE
NRBC # BLD: 0 K/UL (ref 0–0.01)
NRBC BLD-RTO: 0 PER 100 WBC
PH UR STRIP: 6 (ref 5–8)
PLATELET # BLD AUTO: 311 K/UL (ref 150–400)
PMV BLD AUTO: 9.5 FL (ref 8.9–12.9)
POTASSIUM SERPL-SCNC: 4.3 MMOL/L (ref 3.5–5.1)
PROT SERPL-MCNC: 6.6 G/DL (ref 6.4–8.2)
PROT UR STRIP-MCNC: NEGATIVE MG/DL
RBC # BLD AUTO: 4.28 M/UL (ref 3.8–5.2)
RBC #/AREA URNS HPF: NORMAL /HPF (ref 0–5)
SODIUM SERPL-SCNC: 138 MMOL/L (ref 136–145)
SP GR UR REFRACTOMETRY: 1.01 (ref 1–1.03)
URINE CULTURE IF INDICATED: NORMAL
UROBILINOGEN UR QL STRIP.AUTO: 0.2 EU/DL (ref 0.2–1)
WBC # BLD AUTO: 4.6 K/UL (ref 3.6–11)
WBC URNS QL MICRO: NORMAL /HPF (ref 0–4)

## 2025-05-05 PROCEDURE — 80053 COMPREHEN METABOLIC PANEL: CPT

## 2025-05-05 PROCEDURE — 85025 COMPLETE CBC W/AUTO DIFF WBC: CPT

## 2025-05-05 PROCEDURE — 81001 URINALYSIS AUTO W/SCOPE: CPT

## 2025-05-05 PROCEDURE — 93005 ELECTROCARDIOGRAM TRACING: CPT | Performed by: SURGERY

## 2025-05-05 PROCEDURE — 71046 X-RAY EXAM CHEST 2 VIEWS: CPT

## 2025-05-05 NOTE — PERIOP NOTE
[address] [address]   MAIN OR                                  [phone]    MAIN PRE OP             [phone]                                                                                AMBULATORY PRE OP          [phone]  PRE-ADMISSION TESTING    [phone]     Surgery Date:  5/16/25       Is surgery arrival time given by surgeon?  YES  NO    If “NO”, [facility] staff will call you between 4 and 7pm the day before your surgery with your arrival time. (If your surgery is on a Monday, we will call you the Friday before.)    Call [phone] after 7pm Monday-Friday if you did not receive this call.    INSTRUCTIONS BEFORE YOUR SURGERY   When You  Arrive Arrive at [facility] Patient Access on 1st floor the day of your surgery.   Medications to   TAKE   Morning of Surgery MEDICATIONS TO TAKE THE MORNING OF SURGERY WITH A SIP OF WATER: none    You may take these medications, IF NEEDED, the morning of surgery: none  Ask your surgeon/prescribing doctor for instructions on taking or stopping these medications prior to surgery: none   Medications to STOP  before surgery Non-Steroidal anti-inflammatory Drugs (NSAID's): for example, Diclofenac (Voltaren), Ibuprofen (Advil, Motrin), Naproxen (Aleve) 3 days  STOP all herbal supplements and vitamins(unless prescribed by your doctor), and fish oil for 7 days  Other: STOP MELOXICAM 3 DAYS PRIOR TO SURGERY.    (Pain medications not listed above, including Tylenol may be taken up until 4 hours prior to arrival time)   Blood  Thinners If you take Aspirin, Eliquis, Plavix, Coumadin, or any blood-thinning or anti-blood clot medicine, talk to the doctor who prescribed the medications for pre-operative instructions.  If you take aspirin or aspirin containing products for pain, stop 7 days prior to surgery   Going Home - or Spending the Night OUTPATIENT SURGERY: You must have a responsible adult drive

## 2025-05-05 NOTE — PERIOP NOTE
PATIENT GIVEN WRITTEN INSTRUCTIONS TO GO TO THE IMAGING CENTER/CANCER CENTER [address] TO HAVE CHEST XRAY COMPLETED.

## 2025-05-15 ENCOUNTER — ANESTHESIA EVENT (OUTPATIENT)
Facility: HOSPITAL | Age: 60
End: 2025-05-15
Payer: COMMERCIAL

## 2025-05-15 NOTE — ANESTHESIA PRE PROCEDURE
Department of Anesthesiology  Preprocedure Note       Name:  Jeannie Olivas   Age:  59 y.o.  :  1965                                          MRN:  [id number]         Date:  5/15/2025      Surgeon: Surgeon(s):  Michael Renteria MD    Procedure: Procedure(s):  ROBOTIC EXTENDED TOTALLY EXTRAPERITONEAL REPAIR OF RECURRENT VENTRAL HERNIA WITH MESH    Medications prior to admission:   Prior to Admission medications    Medication Sig Start Date End Date Taking? Authorizing Provider   omeprazole (PRILOSEC) 20 MG delayed release capsule Take 1 capsule by mouth daily  Patient taking differently: Take 1 capsule by mouth nightly 3/21/25   Manas Clark APRN - CNP   FLUoxetine (PROZAC) 20 MG capsule Take 1 capsule by mouth daily  Patient taking differently: Take 1 capsule by mouth nightly 3/21/25   Manas Clark APRN - CNP   meloxicam (MOBIC) 15 MG tablet Take 1 tablet by mouth daily 3/21/25   Manas Clark APRN - CNP   lisinopril (PRINIVIL;ZESTRIL) 10 MG tablet Take 1 tablet by mouth daily Contact office to schedule appointment with new provider for refill.  Patient taking differently: Take 1 tablet by mouth nightly Contact office to schedule appointment with new provider for refill. 3/21/25   Manas Clark APRN - CNP   calcium carbonate (OSCAL) 500 MG TABS tablet Take 1 tablet by mouth every other day    ProviderMaría MD   MAGNESIUM CARBONATE  Units by Other route every other day    ProviderMaría MD   vitamin D 25 MCG (1000 UT) CAPS Take 1 capsule by mouth daily 14   Automatic Reconciliation, Ar   potassium gluconate 550 mg tablet Take by mouth daily    Automatic Reconciliation, Ar       Current medications:    No current facility-administered medications for this encounter.     Current Outpatient Medications   Medication Sig Dispense Refill    omeprazole (PRILOSEC) 20 MG delayed release capsule Take 1 capsule by mouth daily (Patient taking differently: Take 1 capsule by mouth

## 2025-05-16 ENCOUNTER — ANESTHESIA (OUTPATIENT)
Facility: HOSPITAL | Age: 60
End: 2025-05-16
Payer: COMMERCIAL

## 2025-05-16 ENCOUNTER — HOSPITAL ENCOUNTER (OUTPATIENT)
Facility: HOSPITAL | Age: 60
Setting detail: OUTPATIENT SURGERY
Discharge: HOME | End: 2025-05-16
Attending: SURGERY | Admitting: SURGERY
Payer: COMMERCIAL

## 2025-05-16 VITALS
TEMPERATURE: 97.2 F | SYSTOLIC BLOOD PRESSURE: 101 MMHG | DIASTOLIC BLOOD PRESSURE: 57 MMHG | OXYGEN SATURATION: 91 % | RESPIRATION RATE: 16 BRPM | HEART RATE: 71 BPM

## 2025-05-16 DIAGNOSIS — K43.9 VENTRAL HERNIA WITHOUT OBSTRUCTION OR GANGRENE: Primary | ICD-10-CM

## 2025-05-16 PROBLEM — K43.0 RECURRENT VENTRAL HERNIA WITH INCARCERATION: Status: ACTIVE | Noted: 2025-04-14

## 2025-05-16 PROCEDURE — 7100000010 HC PHASE II RECOVERY - FIRST 15 MIN: Performed by: SURGERY

## 2025-05-16 PROCEDURE — 6360000002 HC RX W HCPCS: Performed by: ANESTHESIOLOGY

## 2025-05-16 PROCEDURE — 3600000019 HC SURGERY ROBOT ADDTL 15MIN: Performed by: SURGERY

## 2025-05-16 PROCEDURE — 3700000000 HC ANESTHESIA ATTENDED CARE: Performed by: SURGERY

## 2025-05-16 PROCEDURE — 2709999900 HC NON-CHARGEABLE SUPPLY: Performed by: SURGERY

## 2025-05-16 PROCEDURE — 49616 RPR AA HRN RCR 3-10 NCR/STRN: CPT | Performed by: SURGERY

## 2025-05-16 PROCEDURE — 2580000003 HC RX 258: Performed by: SURGERY

## 2025-05-16 PROCEDURE — 2500000003 HC RX 250 WO HCPCS: Performed by: ANESTHESIOLOGY

## 2025-05-16 PROCEDURE — 15734 MUSCLE-SKIN GRAFT TRUNK: CPT | Performed by: SURGERY

## 2025-05-16 PROCEDURE — 7100000011 HC PHASE II RECOVERY - ADDTL 15 MIN: Performed by: SURGERY

## 2025-05-16 PROCEDURE — 2580000003 HC RX 258: Performed by: ANESTHESIOLOGY

## 2025-05-16 PROCEDURE — 3600000009 HC SURGERY ROBOT BASE: Performed by: SURGERY

## 2025-05-16 PROCEDURE — 7100000001 HC PACU RECOVERY - ADDTL 15 MIN: Performed by: SURGERY

## 2025-05-16 PROCEDURE — C1781 MESH (IMPLANTABLE): HCPCS | Performed by: SURGERY

## 2025-05-16 PROCEDURE — 7100000000 HC PACU RECOVERY - FIRST 15 MIN: Performed by: SURGERY

## 2025-05-16 PROCEDURE — 3700000001 HC ADD 15 MINUTES (ANESTHESIA): Performed by: SURGERY

## 2025-05-16 PROCEDURE — S2900 ROBOTIC SURGICAL SYSTEM: HCPCS | Performed by: SURGERY

## 2025-05-16 PROCEDURE — 6360000002 HC RX W HCPCS: Performed by: SURGERY

## 2025-05-16 PROCEDURE — 49329 UNLSTD LAPS PX ABD PERTM&OMN: CPT | Performed by: SURGERY

## 2025-05-16 DEVICE — BARD SOFT MESH, 12" X 12" (30. 5 CM X 30.5 CM)
Type: IMPLANTABLE DEVICE | Site: ABDOMEN | Status: FUNCTIONAL
Brand: BARD

## 2025-05-16 RX ORDER — ROCURONIUM BROMIDE 10 MG/ML
INJECTION, SOLUTION INTRAVENOUS
Status: DISCONTINUED | OUTPATIENT
Start: 2025-05-16 | End: 2025-05-16 | Stop reason: SDUPTHER

## 2025-05-16 RX ORDER — EPHEDRINE SULFATE 50 MG/ML
INJECTION INTRAVENOUS
Status: DISCONTINUED | OUTPATIENT
Start: 2025-05-16 | End: 2025-05-16 | Stop reason: SDUPTHER

## 2025-05-16 RX ORDER — LIDOCAINE HYDROCHLORIDE 20 MG/ML
INJECTION, SOLUTION EPIDURAL; INFILTRATION; INTRACAUDAL; PERINEURAL
Status: DISCONTINUED | OUTPATIENT
Start: 2025-05-16 | End: 2025-05-16 | Stop reason: SDUPTHER

## 2025-05-16 RX ORDER — NALOXONE HYDROCHLORIDE 0.4 MG/ML
INJECTION, SOLUTION INTRAMUSCULAR; INTRAVENOUS; SUBCUTANEOUS PRN
Status: DISCONTINUED | OUTPATIENT
Start: 2025-05-16 | End: 2025-05-16 | Stop reason: HOSPADM

## 2025-05-16 RX ORDER — SODIUM CHLORIDE 0.9 % (FLUSH) 0.9 %
5-40 SYRINGE (ML) INJECTION PRN
Status: DISCONTINUED | OUTPATIENT
Start: 2025-05-16 | End: 2025-05-16 | Stop reason: HOSPADM

## 2025-05-16 RX ORDER — LABETALOL HYDROCHLORIDE 5 MG/ML
10 INJECTION, SOLUTION INTRAVENOUS
Status: DISCONTINUED | OUTPATIENT
Start: 2025-05-16 | End: 2025-05-16 | Stop reason: HOSPADM

## 2025-05-16 RX ORDER — DEXAMETHASONE SODIUM PHOSPHATE 4 MG/ML
INJECTION, SOLUTION INTRA-ARTICULAR; INTRALESIONAL; INTRAMUSCULAR; INTRAVENOUS; SOFT TISSUE
Status: DISCONTINUED | OUTPATIENT
Start: 2025-05-16 | End: 2025-05-16 | Stop reason: SDUPTHER

## 2025-05-16 RX ORDER — ONDANSETRON 4 MG/1
4 TABLET, FILM COATED ORAL 3 TIMES DAILY PRN
Qty: 15 TABLET | Refills: 0 | Status: SHIPPED | OUTPATIENT
Start: 2025-05-16

## 2025-05-16 RX ORDER — FENTANYL CITRATE 50 UG/ML
INJECTION, SOLUTION INTRAMUSCULAR; INTRAVENOUS
Status: DISCONTINUED | OUTPATIENT
Start: 2025-05-16 | End: 2025-05-16 | Stop reason: SDUPTHER

## 2025-05-16 RX ORDER — GLYCOPYRROLATE 0.2 MG/ML
INJECTION, SOLUTION INTRAMUSCULAR; INTRAVENOUS
Status: DISCONTINUED | OUTPATIENT
Start: 2025-05-16 | End: 2025-05-16 | Stop reason: SDUPTHER

## 2025-05-16 RX ORDER — OXYCODONE HYDROCHLORIDE 5 MG/1
5 TABLET ORAL EVERY 6 HOURS PRN
Qty: 20 TABLET | Refills: 0 | Status: SHIPPED | OUTPATIENT
Start: 2025-05-16 | End: 2025-05-21

## 2025-05-16 RX ORDER — SODIUM CHLORIDE, SODIUM LACTATE, POTASSIUM CHLORIDE, CALCIUM CHLORIDE 600; 310; 30; 20 MG/100ML; MG/100ML; MG/100ML; MG/100ML
INJECTION, SOLUTION INTRAVENOUS CONTINUOUS
Status: DISCONTINUED | OUTPATIENT
Start: 2025-05-16 | End: 2025-05-16 | Stop reason: HOSPADM

## 2025-05-16 RX ORDER — PROCHLORPERAZINE EDISYLATE 5 MG/ML
5 INJECTION INTRAMUSCULAR; INTRAVENOUS
Status: COMPLETED | OUTPATIENT
Start: 2025-05-16 | End: 2025-05-16

## 2025-05-16 RX ORDER — SODIUM CHLORIDE 0.9 % (FLUSH) 0.9 %
5-40 SYRINGE (ML) INJECTION EVERY 12 HOURS SCHEDULED
Status: DISCONTINUED | OUTPATIENT
Start: 2025-05-16 | End: 2025-05-16 | Stop reason: HOSPADM

## 2025-05-16 RX ORDER — MIDAZOLAM HYDROCHLORIDE 1 MG/ML
INJECTION, SOLUTION INTRAMUSCULAR; INTRAVENOUS
Status: DISCONTINUED | OUTPATIENT
Start: 2025-05-16 | End: 2025-05-16 | Stop reason: SDUPTHER

## 2025-05-16 RX ORDER — SUCCINYLCHOLINE/SOD CL,ISO/PF 200MG/10ML
SYRINGE (ML) INTRAVENOUS
Status: DISCONTINUED | OUTPATIENT
Start: 2025-05-16 | End: 2025-05-16 | Stop reason: SDUPTHER

## 2025-05-16 RX ORDER — FENTANYL CITRATE 50 UG/ML
25 INJECTION, SOLUTION INTRAMUSCULAR; INTRAVENOUS EVERY 5 MIN PRN
Status: DISCONTINUED | OUTPATIENT
Start: 2025-05-16 | End: 2025-05-16 | Stop reason: HOSPADM

## 2025-05-16 RX ORDER — DEXMEDETOMIDINE HYDROCHLORIDE 100 UG/ML
INJECTION, SOLUTION INTRAVENOUS
Status: DISCONTINUED | OUTPATIENT
Start: 2025-05-16 | End: 2025-05-16 | Stop reason: SDUPTHER

## 2025-05-16 RX ORDER — SODIUM CHLORIDE 9 MG/ML
INJECTION, SOLUTION INTRAVENOUS PRN
Status: DISCONTINUED | OUTPATIENT
Start: 2025-05-16 | End: 2025-05-16 | Stop reason: HOSPADM

## 2025-05-16 RX ORDER — POLYETHYLENE GLYCOL 3350 17 G/17G
17 POWDER, FOR SOLUTION ORAL DAILY
Qty: 340 G | Refills: 0 | Status: SHIPPED | OUTPATIENT
Start: 2025-05-16 | End: 2025-06-05

## 2025-05-16 RX ORDER — BUPIVACAINE HYDROCHLORIDE 5 MG/ML
INJECTION, SOLUTION EPIDURAL; INTRACAUDAL; PERINEURAL PRN
Status: DISCONTINUED | OUTPATIENT
Start: 2025-05-16 | End: 2025-05-16 | Stop reason: HOSPADM

## 2025-05-16 RX ORDER — ONDANSETRON 2 MG/ML
4 INJECTION INTRAMUSCULAR; INTRAVENOUS
Status: COMPLETED | OUTPATIENT
Start: 2025-05-16 | End: 2025-05-16

## 2025-05-16 RX ORDER — HYDROMORPHONE HYDROCHLORIDE 1 MG/ML
0.5 INJECTION, SOLUTION INTRAMUSCULAR; INTRAVENOUS; SUBCUTANEOUS EVERY 5 MIN PRN
Status: DISCONTINUED | OUTPATIENT
Start: 2025-05-16 | End: 2025-05-16 | Stop reason: HOSPADM

## 2025-05-16 RX ORDER — ONDANSETRON 2 MG/ML
INJECTION INTRAMUSCULAR; INTRAVENOUS
Status: DISCONTINUED | OUTPATIENT
Start: 2025-05-16 | End: 2025-05-16 | Stop reason: SDUPTHER

## 2025-05-16 RX ADMIN — DEXAMETHASONE SODIUM PHOSPHATE 4 MG: 4 INJECTION, SOLUTION INTRAMUSCULAR; INTRAVENOUS at 09:10

## 2025-05-16 RX ADMIN — ROCURONIUM BROMIDE 10 MG: 10 INJECTION, SOLUTION INTRAVENOUS at 09:30

## 2025-05-16 RX ADMIN — GLYCOPYRROLATE 0.2 MG: 0.2 INJECTION, SOLUTION INTRAMUSCULAR; INTRAVENOUS at 09:30

## 2025-05-16 RX ADMIN — PROCHLORPERAZINE EDISYLATE 5 MG: 5 INJECTION INTRAMUSCULAR; INTRAVENOUS at 13:50

## 2025-05-16 RX ADMIN — PHENYLEPHRINE HYDROCHLORIDE 80 MCG: 10 INJECTION INTRAVENOUS at 09:20

## 2025-05-16 RX ADMIN — Medication 160 MG: at 09:03

## 2025-05-16 RX ADMIN — HYDROMORPHONE HYDROCHLORIDE 0.25 MG: 1 INJECTION, SOLUTION INTRAMUSCULAR; INTRAVENOUS; SUBCUTANEOUS at 10:21

## 2025-05-16 RX ADMIN — SODIUM CHLORIDE 3000 MG: 9 INJECTION, SOLUTION INTRAVENOUS at 09:15

## 2025-05-16 RX ADMIN — EPHEDRINE SULFATE 5 MG: 50 INJECTION INTRAVENOUS at 09:23

## 2025-05-16 RX ADMIN — Medication 10 MG: at 09:15

## 2025-05-16 RX ADMIN — EPHEDRINE SULFATE 5 MG: 50 INJECTION INTRAVENOUS at 09:30

## 2025-05-16 RX ADMIN — PROPOFOL 180 MG: 10 INJECTION, EMULSION INTRAVENOUS at 09:01

## 2025-05-16 RX ADMIN — PHENYLEPHRINE HYDROCHLORIDE 80 MCG: 10 INJECTION INTRAVENOUS at 09:01

## 2025-05-16 RX ADMIN — DEXMEDETOMIDINE 10 MCG: 100 INJECTION, SOLUTION, CONCENTRATE INTRAVENOUS at 09:15

## 2025-05-16 RX ADMIN — ONDANSETRON 4 MG: 2 INJECTION INTRAMUSCULAR; INTRAVENOUS at 11:59

## 2025-05-16 RX ADMIN — ROCURONIUM BROMIDE 10 MG: 10 INJECTION, SOLUTION INTRAVENOUS at 09:02

## 2025-05-16 RX ADMIN — LIDOCAINE HYDROCHLORIDE 80 MG: 20 INJECTION, SOLUTION EPIDURAL; INFILTRATION; INTRACAUDAL; PERINEURAL at 09:01

## 2025-05-16 RX ADMIN — FENTANYL CITRATE 25 MCG: 50 INJECTION INTRAMUSCULAR; INTRAVENOUS at 13:08

## 2025-05-16 RX ADMIN — ROCURONIUM BROMIDE 10 MG: 10 INJECTION, SOLUTION INTRAVENOUS at 11:46

## 2025-05-16 RX ADMIN — FENTANYL CITRATE 25 MCG: 50 INJECTION INTRAMUSCULAR; INTRAVENOUS at 09:01

## 2025-05-16 RX ADMIN — FENTANYL CITRATE 50 MCG: 50 INJECTION INTRAMUSCULAR; INTRAVENOUS at 09:17

## 2025-05-16 RX ADMIN — PHENYLEPHRINE HYDROCHLORIDE 80 MCG: 10 INJECTION INTRAVENOUS at 09:47

## 2025-05-16 RX ADMIN — ROCURONIUM BROMIDE 5 MG: 10 INJECTION, SOLUTION INTRAVENOUS at 11:29

## 2025-05-16 RX ADMIN — ROCURONIUM BROMIDE 10 MG: 10 INJECTION, SOLUTION INTRAVENOUS at 10:20

## 2025-05-16 RX ADMIN — PHENYLEPHRINE HYDROCHLORIDE 80 MCG: 10 INJECTION INTRAVENOUS at 10:00

## 2025-05-16 RX ADMIN — FENTANYL CITRATE 25 MCG: 50 INJECTION INTRAMUSCULAR; INTRAVENOUS at 09:37

## 2025-05-16 RX ADMIN — MIDAZOLAM 2 MG: 1 INJECTION INTRAMUSCULAR; INTRAVENOUS at 08:51

## 2025-05-16 RX ADMIN — SODIUM CHLORIDE, SODIUM LACTATE, POTASSIUM CHLORIDE, AND CALCIUM CHLORIDE: .6; .31; .03; .02 INJECTION, SOLUTION INTRAVENOUS at 08:25

## 2025-05-16 RX ADMIN — Medication 5 MG: at 10:48

## 2025-05-16 RX ADMIN — PHENYLEPHRINE HYDROCHLORIDE 80 MCG: 10 INJECTION INTRAVENOUS at 09:10

## 2025-05-16 RX ADMIN — Medication 10 MG: at 09:37

## 2025-05-16 RX ADMIN — HYDROMORPHONE HYDROCHLORIDE 0.25 MG: 1 INJECTION, SOLUTION INTRAMUSCULAR; INTRAVENOUS; SUBCUTANEOUS at 10:48

## 2025-05-16 RX ADMIN — DEXMEDETOMIDINE 10 MCG: 100 INJECTION, SOLUTION, CONCENTRATE INTRAVENOUS at 12:05

## 2025-05-16 RX ADMIN — ROCURONIUM BROMIDE 30 MG: 10 INJECTION, SOLUTION INTRAVENOUS at 09:10

## 2025-05-16 RX ADMIN — ROCURONIUM BROMIDE 10 MG: 10 INJECTION, SOLUTION INTRAVENOUS at 10:05

## 2025-05-16 RX ADMIN — ONDANSETRON 4 MG: 2 INJECTION, SOLUTION INTRAMUSCULAR; INTRAVENOUS at 13:07

## 2025-05-16 RX ADMIN — ROCURONIUM BROMIDE 10 MG: 10 INJECTION, SOLUTION INTRAVENOUS at 11:00

## 2025-05-16 ASSESSMENT — PAIN DESCRIPTION - DESCRIPTORS
DESCRIPTORS: ACHING;DISCOMFORT
DESCRIPTORS: ACHING;DULL;GNAWING

## 2025-05-16 ASSESSMENT — PAIN - FUNCTIONAL ASSESSMENT
PAIN_FUNCTIONAL_ASSESSMENT: NONE - DENIES PAIN
PAIN_FUNCTIONAL_ASSESSMENT: NONE - DENIES PAIN
PAIN_FUNCTIONAL_ASSESSMENT: 0-10
PAIN_FUNCTIONAL_ASSESSMENT: ACTIVITIES ARE NOT PREVENTED
PAIN_FUNCTIONAL_ASSESSMENT: NONE - DENIES PAIN

## 2025-05-16 ASSESSMENT — PAIN SCALES - GENERAL: PAINLEVEL_OUTOF10: 6

## 2025-05-16 ASSESSMENT — PAIN DESCRIPTION - LOCATION: LOCATION: ABDOMEN

## 2025-05-16 NOTE — PROGRESS NOTES
Discharge instructions reviewed with patient and family using teachback. All questions have been answered. Prescriptions sent to [facility]. Vital signs stable, pain appropriately managed. Patient wheeled off the unit with PACU staff.

## 2025-05-16 NOTE — OP NOTE
[address]  [address]                            OPERATIVE REPORT      PATIENT NAME: GREGG GUEVARA              : 1965  MED REC NO: [id number]                       ROOM: OR  ACCOUNT NO: [id number]                       ADMIT DATE: 2025  PROVIDER: Michael Renteria MD    DATE OF SERVICE:  2025    PREOPERATIVE DIAGNOSES:  Recurrent ventral hernia.    POSTOPERATIVE DIAGNOSES:  Incarcerated recurrent ventral hernia.    PROCEDURES PERFORMED:  Robotic extended totally extraperitoneal repair of recurrent incarcerated ventral hernia with mesh (CPT 79792; CPT 08785, use modifier 50 for bilateral procedure).    SURGEON:  Michael Renteria MD    ASSISTANT:  Lindsey Pelayo SA.    ANESTHESIA:  General endotracheal.    DRAINS:  None.    SPONGE COUNT:  Correct.    NEEDLE COUNT:  Correct.    ESTIMATED BLOOD LOSS:  75 mL.    SPECIMENS REMOVED:  None.    COMPLICATIONS:  None.    IMPLANTS:  Bard soft mesh (28 cm x 25 cm).    INDICATIONS:  The patient is a 59-year-old morbidly obese white female with a history of ventral hernia, repaired primarily with subsequent recurrence.  The hernia was repaired again at time of hand-assisted partial colectomy.  She notes a recurrent bulge appearing a few years ago.  Lump has enlarged, and has grown increasingly tender to palpation.  Imaging reveals a 6 cm defect containing abdominal fat.  On exam, the hernia demonstrates non-reduced ability with thinning of the overlying skin.  She is taken to the operating room today for robotic extended totally extraperitoneal repair of the recurrent, incarcerated ventral hernia.    INTRAOPERATIVE FINDINGS:       1. Incarcerated recurrent ventral hernia at umbilicus, 6 cm defect, containing 30% of omentum.     2. Bilateral myofascial flap advancement through incision of bilateral posterior rectus sheaths, with creation of large retromuscular space.     3. Closure of the  posterior layer, followed by repair of defect using permanent barbed suture.     4. Reinforcement with 25 cm x 28 cm macroporous mesh.    DESCRIPTION OF PROCEDURE:  The patient was identified as correct patient in the preoperative holding area and informed consent was confirmed.  After answering the patient's remaining questions and marking her left linea semilunaris using abdominal wall ultrasound, she was taken to the operating room and placed on the operating room table in the supine position.  Sequential compression devices were placed on both lower extremities.  Following the uneventful initiation of general anesthesia, she was carefully secured to the operating room table with safety strap in place.  All potential pressure points were padded with egg crate.  Arms were tucked to her sides, and the bed was flexed.  Her abdomen was prepped and draped in usual sterile fashion.  Final time-out was performed, it was confirmed she had received intravenous antibiotics.  A 5 mm trocar was directed into the left posterior rectus space using an Optiview technique.  After confirming correct location of the trocar tip, insufflation with carbon dioxide gas was initiated.  Once adequate working space had been developed, the 5 mm, 30-degree laparoscope was inserted.  No signs of entry into the abdominal space were present.  Blunt dissection using the scope was used to create additional space, which allowed placement of an 8 mm robotic trocar inserted 8 cm caudal to the initial trocar insertion site.  Additional dissection using blunt dissection and bipolar energy were used to allow placement of a 3rd trocar, 8 mm robotic, 8 cm caudal to the 2nd trocar insertion.  The initial trocar was then upsized to an 8 mm robotic trocar, and the da Blayne Xi robotic cart was docked.  Additional dissection using elizabeth with cautery, bipolar energy were used to fully dissect the left posterior rectus space.  Myofascial flap advancement on

## 2025-05-16 NOTE — BRIEF OP NOTE
Brief Postoperative Note      Patient: Jeannie Olivas  YOB: 1965  MRN: [id number]    Date of Procedure: 5/16/2025    Pre-Op Diagnosis Codes:      * Ventral hernia without obstruction or gangrene [K43.9]    Post-Op Diagnosis: Incarcerated recurrent ventral hernia.       Procedure(s):  ROBOTIC EXTENDED TOTALLY EXTRAPERITONEAL REPAIR OF RECURRENT VENTRAL HERNIA WITH MESH    Surgeon(s):  Michael Renteria MD    Assistant:  Surgical Assistant: Lindsey Pelayo    Anesthesia: General    Estimated Blood Loss (mL): 75 mL    Complications: None    Specimens:   * No specimens in log *    Implants:  Implant Name Type Inv. Item Serial No.  Lot No. LRB No. Used Action   MESH MONICA [id number] POLYPR SQ L PORE MFIL SF - SN/A Mesh MESH MONICA [id number] POLYPR SQ L PORE MFIL SF N/A BARD DAVOL-WD [id number] N/A 1 Implanted         Drains: * No LDAs found *    Findings:  Infection Present At Time Of Surgery (PATOS) (choose all levels that have infection present):  No infection present  Other Findings: Incarcerated recurrent ventral hernia at umbilicus, 6 cm defect, containing 30% of greater omentum.  Bilateral myofascial flap advancement through incision of bilateral posterior rectus sheaths, creation of large retromuscular space.  Closure of posterior layer, followed by repair of defect using permanent barbed suture.  Reinforcement with 28 cm x 25 cm macro-porous mesh.    Electronically signed by Michael Renteria MD on 5/16/2025 at 12:15 PM

## 2025-05-16 NOTE — H&P
Subjective:      Jeannie Olivas is a 59 y.o. female who was [facility] for evaluation of recurrent ventral hernia.  Patient has a history of hand assist laparoscopic sigmoid resection for treatment of colon cancer; during this procedure, a previously identified umbilical hernia was repaired during closure of the periumbilical extraction wound.  Patient has symptoms of bulging, mass, which are made worse with coughing, lifting, standing. Symptoms were first noted a few years ago. Symptoms did not start at work. Pain is sharp, intermittent. Lump is not reducible. The patient has no symptoms of chronic constipation, chronic cough, difficulty urinating. Patient has lost approximately 50 pounds through behavior modification, Medifast diet.  No nausea, vomiting, or dysuria.  No bloody bowel movements; no evidence of recurrent colon cancer.     Past Medical History        Past Medical History:   Diagnosis Date    Adjustment disorder with mixed anxiety and depressed mood      Colorectal cancer (HCC)       2008    DJD (degenerative joint disease) 11/29/2010    Elevated triglycerides with high cholesterol 9/29/2014    Encounter for immunization 10/1/2015    HTN (hypertension), benign 3/14/2016    Primary osteoarthritis involving multiple joints 10/1/2015    Vitamin D deficiency 9/29/2014              Patient Active Problem List     Diagnosis Date Noted    Incisional hernia, without obstruction or gangrene 03/31/2025    Muscle spasm of left lower extremity 06/20/2021    Colorectal cancer (HCC)      Morbid obesity 06/16/2017    Arthralgia of left hip 03/14/2016    HTN (hypertension), benign 03/14/2016    Primary osteoarthritis involving multiple joints 10/01/2015    Dyslipidemia, goal LDL below 130 09/29/2014    Vitamin D deficiency 09/29/2014    Adjustment disorder with mixed anxiety and depressed mood        Past Surgical History         Past Surgical History:   Procedure Laterality Date    BIOPSY OF

## 2025-05-16 NOTE — ANESTHESIA POSTPROCEDURE EVALUATION
Department of Anesthesiology  Postprocedure Note    Patient: Jeannie Olivas  MRN: [id number]  YOB: 1965  Date of evaluation: 5/16/2025    Procedure Summary       Date: 05/16/25 Room / Location: [facility] MAIN OR [address] [place] [address] MAIN OR    Anesthesia Start: 0851 Anesthesia Stop: 1234    Procedure: ROBOTIC EXTENDED TOTALLY EXTRAPERITONEAL REPAIR OF RECURRENT VENTRAL HERNIA WITH MESH (Abdomen) Diagnosis:       Ventral hernia without obstruction or gangrene      (Ventral hernia without obstruction or gangrene [K43.9])    Providers: Michael Renteria MD Responsible Provider: Michael Sánchez MD    Anesthesia Type: General ASA Status: 3            Anesthesia Type: General    Irwin Phase I: Irwin Score: 8    Irwin Phase II:      Anesthesia Post Evaluation    Patient location during evaluation: PACU  Patient participation: complete - patient participated  Level of consciousness: awake  Airway patency: patent  Nausea & Vomiting: no nausea  Cardiovascular status: hemodynamically stable  Respiratory status: acceptable  Hydration status: stable  Pain management: adequate    No notable events documented.

## 2025-05-19 ENCOUNTER — TELEPHONE (OUTPATIENT)
Age: 60
End: 2025-05-19

## 2025-05-19 NOTE — TELEPHONE ENCOUNTER
I called the patient back and she said her job has offered her to return to work from home half days and she said she is off her pain medication and wanted to know if that was okay. I told her Dr Renteria said she could return to work as long as she was off pain medication and does not do any lifting. Pt said she does no lifting. I asked her what she needs for us as far as returning to work and she said she did not know. She will reach out to her job and get back with us. Pt in agreement.

## 2025-06-04 ENCOUNTER — OFFICE VISIT (OUTPATIENT)
Age: 60
End: 2025-06-04

## 2025-06-04 VITALS
WEIGHT: 244 LBS | OXYGEN SATURATION: 98 % | TEMPERATURE: 98.4 F | HEIGHT: 66 IN | HEART RATE: 87 BPM | SYSTOLIC BLOOD PRESSURE: 112 MMHG | DIASTOLIC BLOOD PRESSURE: 74 MMHG | BODY MASS INDEX: 39.21 KG/M2 | RESPIRATION RATE: 14 BRPM

## 2025-06-04 DIAGNOSIS — Z09 POSTOPERATIVE EXAMINATION: Primary | ICD-10-CM

## 2025-06-04 PROCEDURE — 99024 POSTOP FOLLOW-UP VISIT: CPT

## 2025-06-04 ASSESSMENT — PATIENT HEALTH QUESTIONNAIRE - PHQ9
SUM OF ALL RESPONSES TO PHQ QUESTIONS 1-9: 0
2. FEELING DOWN, DEPRESSED OR HOPELESS: NOT AT ALL
SUM OF ALL RESPONSES TO PHQ QUESTIONS 1-9: 0
1. LITTLE INTEREST OR PLEASURE IN DOING THINGS: NOT AT ALL

## 2025-06-04 NOTE — PROGRESS NOTES
Identified patient with two patient identifiers (name and ). Reviewed chart in preparation for visit and have obtained necessary documentation.    Jeannie Olivas is a 59 y.o. female  Chief Complaint   Patient presents with    Post-Op Check     2 weeks ROBOTIC EXTENDED TOTALLY EXTRAPERITONEAL REPAIR OF RECURRENT VENTRAL HERNIA WITH MESH      /74 (BP Site: Left Upper Arm, Patient Position: Sitting, BP Cuff Size: Large Adult)   Pulse 87   Temp 98.4 °F (36.9 °C) (Oral)   Resp 14   Ht 1.676 m (5' 6\")   Wt 110.7 kg (244 lb)   LMP 2008   SpO2 98%   BMI 39.38 kg/m²     1. Have you been to the ER, urgent care clinic since your last visit?  Hospitalized since your last visit?no    2. Have you seen or consulted any other health care providers outside of the [facility] since your last visit?  Include any pap smears or colon screening. no

## 2025-06-04 NOTE — PROGRESS NOTES
CC: Post Operative state    Subjective:     Jeannie Olivas is a 59 y.o. female presents for postop care 2 + weeks s/p Robotic extended totally extraperitoneal repair of recurrent incarcerated ventral hernia with mesh     Pt states she has been doing well postop. States abdominal pain is minimal and well tolerated without any pain medication. Denies any drainage or issues with surgical sites.   Tolerating a regular diet; No nausea or vomiting.   Bowel movements are regular.  Voiding without difficulty.  Denies fever/chills.      Review of Systems:  A comprehensive review of systems was negative except for that written above      Ms. Olivas has a reminder for a \"due or due soon\" health maintenance. I have asked that she contact her primary care provider for follow-up on this health maintenance.      Objective:     /74 (BP Site: Left Upper Arm, Patient Position: Sitting, BP Cuff Size: Large Adult)   Pulse 87   Temp 98.4 °F (36.9 °C) (Oral)   Resp 14   Ht 1.676 m (5' 6\")   Wt 110.7 kg (244 lb)   LMP 09/05/2008   SpO2 98%   BMI 39.38 kg/m²     General: alert, appears stated age, cooperative, and no distress  CV: Regular rate and rhythm  Pulmonary: Lungs clear to auscultation; unlabored  Abdomen:soft, bowel sounds active, non-tender  Surgical Incisions:  healing well, no drainage, no erythema, no dehiscence, incision well approximated    Assessment:      Diagnosis Orders   1. Postoperative examination            59 year old female 2 + weeks s/p Robotic extended totally extraperitoneal repair of recurrent incarcerated ventral hernia with mesh doing well postop.     Plan:       Wound care discussed. May submerge in water and continue to wash with mild soap and water. May moisturize surgical sites as desired.   May continue to wear abd binder and /or supportive wear for additional abd support.   May use heating pad for any abdominal soreness/pain.   No lifting greater than 15 lbs x 4 weeks then may advance

## (undated) DEVICE — SUTURE MONOCRYL ABSORBABLE L 6 IN SZ 2-0 POLYGLCOLIC ACD MONOFILAMENT CT-2

## (undated) DEVICE — GLOVE SURG SZ 75 L1212IN FNGR THK138MIL BRN LTX FREE

## (undated) DEVICE — SOLUTION ANTIFOG VIS SYS CLEARIFY LAPSCP

## (undated) DEVICE — SUTURE MONOCRYL + SZ 4-0 L27IN ABSRB UD L19MM PS-2 3/8 CIR MCP426H

## (undated) DEVICE — SEALER ONE-STEP 37CM LIGASURE MARYLAND XP

## (undated) DEVICE — TBG INSUFFLATION LUER LOCK: Brand: MEDLINE INDUSTRIES, INC.

## (undated) DEVICE — GENERAL LAPAROSCOPY - SMH: Brand: MEDLINE INDUSTRIES, INC.

## (undated) DEVICE — BLADELESS OBTURATOR: Brand: WECK VISTA

## (undated) DEVICE — HYPODERMIC SAFETY NEEDLE: Brand: MAGELLAN

## (undated) DEVICE — X-RAY DETECTABLE SPONGES,16 PLY: Brand: VISTEC

## (undated) DEVICE — ARM DRAPE

## (undated) DEVICE — SUTURE VLOC N ABSRB DEV VLOCN1126

## (undated) DEVICE — GARMENT,MEDLINE,DVT,INT,CALF,MED, GEN2: Brand: MEDLINE

## (undated) DEVICE — BLADELESS OBTURATOR, LONG: Brand: WECK VISTA

## (undated) DEVICE — SYRINGE MED 10ML LUERLOCK TIP W/O SFTY DISP

## (undated) DEVICE — BINDER ABD M/L H12IN FOR 46-62IN WHT 4 SLD PNL DSGN HOOP

## (undated) DEVICE — 1LYRTR 16FR10ML 100%SILI SNAP: Brand: MEDLINE INDUSTRIES, INC.

## (undated) DEVICE — GLOVE ORTHO 7   MSG9470

## (undated) DEVICE — TIP COVER ACCESSORY

## (undated) DEVICE — SEAL

## (undated) DEVICE — LAPAROSCOPIC TROCAR SLEEVE/SINGLE USE: Brand: KII® OPTICAL ACCESS SYSTEM

## (undated) DEVICE — SYRINGE MED 30ML STD CLR PLAS LUERLOCK TIP N CTRL DISP

## (undated) DEVICE — PACK,BASIC,SIRUS,V: Brand: MEDLINE

## (undated) DEVICE — LIQUIBAND RAPID ADHESIVE 36/CS 0.8ML: Brand: MEDLINE